# Patient Record
Sex: MALE | Race: WHITE | NOT HISPANIC OR LATINO | Employment: OTHER | URBAN - METROPOLITAN AREA
[De-identification: names, ages, dates, MRNs, and addresses within clinical notes are randomized per-mention and may not be internally consistent; named-entity substitution may affect disease eponyms.]

---

## 2017-12-18 DIAGNOSIS — F41.1 GAD (GENERALIZED ANXIETY DISORDER): ICD-10-CM

## 2017-12-21 RX ORDER — CITALOPRAM HYDROBROMIDE 20 MG/1
20 TABLET ORAL DAILY
Qty: 30 TABLET | Refills: 0 | Status: SHIPPED | OUTPATIENT
Start: 2017-12-21 | End: 2018-02-06

## 2017-12-21 NOTE — TELEPHONE ENCOUNTER
Requested Prescriptions   Pending Prescriptions Disp Refills     citalopram (CELEXA) 20 MG tablet [Pharmacy Med Name: CITALOPRAM HBR 20 MG TABLET] 90 tablet 3     Sig: TAKE 1 TABLET BY MOUTH EVERY DAY    SSRIs Protocol Failed    12/18/2017 10:26 AM       Failed - Recent or future visit with authorizing provider    Patient had office visit in the last year or has a visit in the next 30 days with authorizing provider.  See chart review.          Passed - Medication is NOT Bupropion    If the medication is Bupropion (Wellbutrin), and the patient is taking for smoking cessation; OK to refill.         Passed - Patient is age 18 or older        Medication is being filled for 1 time refill only due to:  Patient needs to be seen because it has been more than one year since last visit.   Dur for annual appointment, please call to schedule.   Valery Burrell RN  Triage Nurse

## 2017-12-27 DIAGNOSIS — I10 ESSENTIAL HYPERTENSION WITH GOAL BLOOD PRESSURE LESS THAN 140/90: ICD-10-CM

## 2017-12-27 NOTE — TELEPHONE ENCOUNTER
Requested Prescriptions   Pending Prescriptions Disp Refills     lisinopril-hydrochlorothiazide (PRINZIDE/ZESTORETIC) 20-12.5 MG per tablet [Pharmacy Med Name: LISINOPRIL-HCTZ 20-12.5 MG TAB]    Last Written Prescription Date:  11/8/2016  Last Fill Quantity: 90,  # refills: 3   Last Office Visit with [facility] or [facility] prescribing provider:  11/8/2016   Future Office Visit:      90 tablet 3     Sig: TAKE 1 TABLET BY MOUTH DAILY    Diuretics (Including Combos) Protocol Failed    12/27/2017 10:56 AM       Failed - Blood pressure under 140/90    BP Readings from Last 3 Encounters:   11/08/16 122/88   06/04/15 112/74   04/14/14 118/80                Failed - Recent or future visit with authorizing provider's specialty    Patient had office visit in the last year or has a visit in the next 30 days with authorizing provider.  See chart review.              Failed - Normal serum creatinine on file in past 12 months    Recent Labs   Lab Test  11/08/16   0942   CR  1.13             Failed - Normal serum potassium on file in past 12 months    Recent Labs   Lab Test  11/08/16   0942   POTASSIUM  4.3                   Failed - Normal serum sodium on file in past 12 months    Recent Labs   Lab Test  11/08/16   0942   NA  138             Passed - Patient is age 18 or older

## 2017-12-29 RX ORDER — LISINOPRIL AND HYDROCHLOROTHIAZIDE 12.5; 2 MG/1; MG/1
TABLET ORAL
Qty: 30 TABLET | Refills: 0 | Status: SHIPPED | OUTPATIENT
Start: 2017-12-29 | End: 2018-01-30

## 2017-12-29 NOTE — TELEPHONE ENCOUNTER
Pharmacy calling that patient is out.   Routing refill request to provider for review/approval because:  Labs not current:  K+ Creat NA  Patient needs to be seen because it has been more than 1 year since last office visit.  Patient called for appointment in another refill request  But has not scheduled.     Paige Siddiqui RN

## 2017-12-29 NOTE — TELEPHONE ENCOUNTER
rx sent for 30 days without refills. Needs appointment lab before further refills. Please let know.    Candida Sin MD  Internal Medicine/Pediatrics

## 2018-01-02 NOTE — TELEPHONE ENCOUNTER
Left message for patient to schedule an annual physical. Needs fasting labs done and medication refill.  DOT Rosario

## 2018-01-30 DIAGNOSIS — I10 ESSENTIAL HYPERTENSION WITH GOAL BLOOD PRESSURE LESS THAN 140/90: ICD-10-CM

## 2018-01-30 NOTE — TELEPHONE ENCOUNTER
"Requested Prescriptions   Pending Prescriptions Disp Refills     lisinopril-hydrochlorothiazide (PRINZIDE/ZESTORETIC) 20-12.5 MG per tablet [Pharmacy Med Name: LISINOPRIL-HCTZ 20-12.5 MG TAB]    Last Written Prescription Date:  12/29/2017  Last Fill Quantity: 30,  # refills: 0   Last Office Visit with FM provider:  11/8/2016   Future Office Visit:      30 tablet 0     Sig: TAKE 1 TABLET BY MOUTH DAILY **NEEDS APPT**    Diuretics (Including Combos) Protocol Failed    1/30/2018  2:07 AM       Failed - Blood pressure under 140/90    BP Readings from Last 3 Encounters:   11/08/16 122/88   06/04/15 112/74   04/14/14 118/80                Failed - Recent or future visit with authorizing provider's specialty    Patient had office visit in the last year or has a visit in the next 30 days with authorizing provider.  See \"Patient Info\" tab in inbasket, or \"Choose Columns\" in Meds & Orders section of the refill encounter.            Failed - Normal serum creatinine on file in past 12 months    Recent Labs   Lab Test  11/08/16   0942   CR  1.13             Failed - Normal serum potassium on file in past 12 months    Recent Labs   Lab Test  11/08/16   0942   POTASSIUM  4.3                   Failed - Normal serum sodium on file in past 12 months    Recent Labs   Lab Test  11/08/16   0942   NA  138             Passed - Patient is age 18 or older          "

## 2018-02-02 RX ORDER — LISINOPRIL AND HYDROCHLOROTHIAZIDE 12.5; 2 MG/1; MG/1
TABLET ORAL
Qty: 30 TABLET | Refills: 0 | Status: SHIPPED | OUTPATIENT
Start: 2018-02-02 | End: 2018-02-06

## 2018-02-02 NOTE — TELEPHONE ENCOUNTER
Medication is being filled for 1 time refill only due to:  Patient needs labs annual potassium and creatinine. Patient needs to be seen because it has been more than one year since last visit. patient has future appointment scheduled with [facility] on 2/6/18    Doreen Kyle RN

## 2018-02-06 ENCOUNTER — OFFICE VISIT (OUTPATIENT)
Dept: PEDIATRICS | Facility: CLINIC | Age: 48
End: 2018-02-06
Payer: COMMERCIAL

## 2018-02-06 VITALS
WEIGHT: 242 LBS | DIASTOLIC BLOOD PRESSURE: 82 MMHG | HEART RATE: 62 BPM | SYSTOLIC BLOOD PRESSURE: 126 MMHG | TEMPERATURE: 97.6 F | BODY MASS INDEX: 34.65 KG/M2 | HEIGHT: 70 IN

## 2018-02-06 DIAGNOSIS — R73.03 PREDIABETES: ICD-10-CM

## 2018-02-06 DIAGNOSIS — I10 ESSENTIAL HYPERTENSION WITH GOAL BLOOD PRESSURE LESS THAN 140/90: ICD-10-CM

## 2018-02-06 DIAGNOSIS — R10.9 LEFT FLANK PAIN: ICD-10-CM

## 2018-02-06 DIAGNOSIS — F41.1 GAD (GENERALIZED ANXIETY DISORDER): ICD-10-CM

## 2018-02-06 DIAGNOSIS — Z00.00 ENCOUNTER FOR ROUTINE ADULT HEALTH EXAMINATION WITHOUT ABNORMAL FINDINGS: Primary | ICD-10-CM

## 2018-02-06 DIAGNOSIS — E78.5 HYPERLIPIDEMIA WITH TARGET LDL LESS THAN 130: ICD-10-CM

## 2018-02-06 LAB
ALBUMIN SERPL-MCNC: 4.3 G/DL (ref 3.4–5)
ALBUMIN UR-MCNC: NEGATIVE MG/DL
ALP SERPL-CCNC: 62 U/L (ref 40–150)
ALT SERPL W P-5'-P-CCNC: 37 U/L (ref 0–70)
ANION GAP SERPL CALCULATED.3IONS-SCNC: 7 MMOL/L (ref 3–14)
APPEARANCE UR: CLEAR
AST SERPL W P-5'-P-CCNC: 20 U/L (ref 0–45)
BILIRUB SERPL-MCNC: 0.7 MG/DL (ref 0.2–1.3)
BILIRUB UR QL STRIP: NEGATIVE
BUN SERPL-MCNC: 13 MG/DL (ref 7–30)
CALCIUM SERPL-MCNC: 9.1 MG/DL (ref 8.5–10.1)
CHLORIDE SERPL-SCNC: 102 MMOL/L (ref 94–109)
CHOLEST SERPL-MCNC: 236 MG/DL
CO2 SERPL-SCNC: 28 MMOL/L (ref 20–32)
COLOR UR AUTO: YELLOW
CREAT SERPL-MCNC: 1.04 MG/DL (ref 0.66–1.25)
GFR SERPL CREATININE-BSD FRML MDRD: 77 ML/MIN/1.7M2
GLUCOSE SERPL-MCNC: 103 MG/DL (ref 70–99)
GLUCOSE UR STRIP-MCNC: NEGATIVE MG/DL
HBA1C MFR BLD: 5.3 % (ref 4.3–6)
HDLC SERPL-MCNC: 31 MG/DL
HGB UR QL STRIP: NEGATIVE
KETONES UR STRIP-MCNC: NEGATIVE MG/DL
LDLC SERPL CALC-MCNC: 161 MG/DL
LEUKOCYTE ESTERASE UR QL STRIP: NEGATIVE
NITRATE UR QL: NEGATIVE
NONHDLC SERPL-MCNC: 205 MG/DL
PH UR STRIP: 6 PH (ref 5–7)
POTASSIUM SERPL-SCNC: 4.1 MMOL/L (ref 3.4–5.3)
PROT SERPL-MCNC: 7.5 G/DL (ref 6.8–8.8)
SODIUM SERPL-SCNC: 137 MMOL/L (ref 133–144)
SOURCE: NORMAL
SP GR UR STRIP: 1.01 (ref 1–1.03)
TRIGL SERPL-MCNC: 219 MG/DL
UROBILINOGEN UR STRIP-ACNC: 0.2 EU/DL (ref 0.2–1)

## 2018-02-06 PROCEDURE — 80061 LIPID PANEL: CPT | Performed by: INTERNAL MEDICINE

## 2018-02-06 PROCEDURE — 99396 PREV VISIT EST AGE 40-64: CPT | Performed by: INTERNAL MEDICINE

## 2018-02-06 PROCEDURE — 83036 HEMOGLOBIN GLYCOSYLATED A1C: CPT | Performed by: INTERNAL MEDICINE

## 2018-02-06 PROCEDURE — 36415 COLL VENOUS BLD VENIPUNCTURE: CPT | Performed by: INTERNAL MEDICINE

## 2018-02-06 PROCEDURE — 80053 COMPREHEN METABOLIC PANEL: CPT | Performed by: INTERNAL MEDICINE

## 2018-02-06 PROCEDURE — 81003 URINALYSIS AUTO W/O SCOPE: CPT | Performed by: INTERNAL MEDICINE

## 2018-02-06 PROCEDURE — 99213 OFFICE O/P EST LOW 20 MIN: CPT | Mod: 25 | Performed by: INTERNAL MEDICINE

## 2018-02-06 RX ORDER — CITALOPRAM HYDROBROMIDE 20 MG/1
20 TABLET ORAL DAILY
Qty: 90 TABLET | Refills: 3 | Status: SHIPPED | OUTPATIENT
Start: 2018-02-06 | End: 2019-02-14

## 2018-02-06 RX ORDER — LISINOPRIL AND HYDROCHLOROTHIAZIDE 12.5; 2 MG/1; MG/1
1 TABLET ORAL DAILY
Qty: 90 TABLET | Refills: 3 | Status: SHIPPED | OUTPATIENT
Start: 2018-02-06 | End: 2019-02-14

## 2018-02-06 ASSESSMENT — ANXIETY QUESTIONNAIRES
GAD7 TOTAL SCORE: 0
2. NOT BEING ABLE TO STOP OR CONTROL WORRYING: NOT AT ALL
5. BEING SO RESTLESS THAT IT IS HARD TO SIT STILL: NOT AT ALL
6. BECOMING EASILY ANNOYED OR IRRITABLE: NOT AT ALL
1. FEELING NERVOUS, ANXIOUS, OR ON EDGE: NOT AT ALL
IF YOU CHECKED OFF ANY PROBLEMS ON THIS QUESTIONNAIRE, HOW DIFFICULT HAVE THESE PROBLEMS MADE IT FOR YOU TO DO YOUR WORK, TAKE CARE OF THINGS AT HOME, OR GET ALONG WITH OTHER PEOPLE: NOT DIFFICULT AT ALL
3. WORRYING TOO MUCH ABOUT DIFFERENT THINGS: NOT AT ALL
7. FEELING AFRAID AS IF SOMETHING AWFUL MIGHT HAPPEN: NOT AT ALL

## 2018-02-06 ASSESSMENT — PATIENT HEALTH QUESTIONNAIRE - PHQ9: 5. POOR APPETITE OR OVEREATING: NOT AT ALL

## 2018-02-06 NOTE — PROGRESS NOTES
SUBJECTIVE:   CC: Geovanni Galvez is an 47 year old male who presents for preventative health visit.     Healthy Habits:    Do you get at least three servings of calcium containing foods daily (dairy, green leafy vegetables, etc.)? yes    Amount of exercise or daily activities, outside of work: sometimes    Problems taking medications regularly No    Medication side effects: No    Have you had an eye exam in the past two years? no    Do you see a dentist twice per year? Every 3 years    Do you have sleep apnea, excessive snoring or daytime drowsiness?no     Concerns:  1)Left side pain on and off for past few months.  Pain mid back, does not radiate.  Worse with coughing and straining  Denies injury or trauma.  Denies hematuria or hx renal stone      Today's PHQ-2 Score:   PHQ-2 ( 1999 [employer]) 2/6/2018 11/8/2016   Q1: Little interest or pleasure in doing things 0 0   Q2: Feeling down, depressed or hopeless 0 0   PHQ-2 Score 0 0       Abuse: Current or Past(Physical, Sexual or Emotional)- No  Do you feel safe in your environment - Yes     If you drink alcohol do you typically have >3 drinks per day or >7 drinks per week? No                      Last PSA: No results found for: PSA    Reviewed orders with patient. Reviewed health maintenance and updated orders accordingly - Yes    Patient Active Problem List   Diagnosis     Prediabetes     Obesity     Hyperlipidemia with target LDL less than 130     Essential hypertension with goal blood pressure less than 140/90     ISH (generalized anxiety disorder)     Past Medical History:   Diagnosis Date     Hypertension        No past surgical history on file.    Family History   Problem Relation Age of Onset     Alcohol/Drug Father      CANCER Father      pancreatic       ALLERGIES   No Known Allergies      Reviewed and updated as needed this visit by clinical staff  Tobacco  Allergies         Reviewed and updated as needed this visit by Provider      ROS:  C:  "NEGATIVE for fever, chills  I: NEGATIVE for worrisome rashes, moles or lesions  E: NEGATIVE for vision changes or irritation  ENT: NEGATIVE for ear, mouth and throat problems  R: NEGATIVE for significant cough or SOB  CV: NEGATIVE for chest pain, palpitations or peripheral edema  GI: NEGATIVE for nausea, abdominal pain, heartburn, or change in bowel habits   male: negative for dysuria, hematuria, decreased urinary stream, erectile dysfunction, urethral discharge  M: NEGATIVE for significant arthralgias or myalgia  N: NEGATIVE for weakness, dizziness or paresthesias  P: NEGATIVE for changes in mood or affect    Current Outpatient Prescriptions   Medication Sig Dispense Refill     lisinopril-hydrochlorothiazide (PRINZIDE/ZESTORETIC) 20-12.5 MG per tablet Take 1 tablet by mouth daily 90 tablet 3     citalopram (CELEXA) 20 MG tablet Take 1 tablet (20 mg) by mouth daily 90 tablet 3       OBJECTIVE:   /82 (Cuff Size: Adult Large)  Pulse 62  Temp 97.6  F (36.4  C) (Oral)  Ht 5' 10\" (1.778 m)  Wt 242 lb (109.8 kg)  BMI 34.72 kg/m2  EXAM:  GENERAL: alert and no distress  EYES: Eyes grossly normal to inspection, PERRL and conjunctivae and sclerae normal  HENT: ear canals and TM's normal, nose and mouth without ulcers or lesions  NECK: no adenopathy, no asymmetry, masses, or scars and thyroid normal to palpation  RESP: lungs clear to auscultation - no rales, rhonchi or wheezes  CV: regular rate and rhythm, normal S1 S2, no S3 or S4, no murmur, click or rub, no peripheral edema and peripheral pulses strong  ABDOMEN: soft, nontender, no hepatosplenomegaly, no masses and bowel sounds normal  Back: tenderness to palpation left CVA, without midline lumbar spinous tenderness  MS: no gross musculoskeletal defects noted, no edema  SKIN: no suspicious lesions or rashes  NEURO: Normal strength and tone, mentation intact and speech normal  PSYCH: mentation appears normal, affect normal/bright    ASSESSMENT/PLAN:       " "ICD-10-CM    1. Encounter for routine adult health examination without abnormal findings Z00.00        2. Essential hypertension with goal blood pressure less than 140/90 I10 lisinopril-hydrochlorothiazide (PRINZIDE/ZESTORETIC) 20-12.5 MG per tablet     Lipid panel reflex to direct LDL Fasting     Comprehensive metabolic panel (BMP + Alb, Alk Phos, ALT, AST, Total. Bili, TP)       Adequate control.  Continue current regimen without changes.      3. Hyperlipidemia with target LDL less than 130 E78.5        Rpt fasting labwork, no current rx       4. ISH (generalized anxiety disorder) F41.1 citalopram (CELEXA) 20 MG tablet     Adequate control.  Continue current regimen without changes.      5. Prediabetes R73.03 Hemoglobin A1c     Discussed weight mgmt     6. Left flank pain R10.9 UA reflex to Microscopic     US Renal Limited      Check u/a, plan for renal US.  ?renal stone/also consider musculoskeletal LBP      Follow-up next 2-3 weeks if sx persist  Will contact patient with results as they return.       COUNSELING:  Reviewed preventive health counseling, as reflected in patient instructions       Regular exercise       Healthy diet/nutrition       Colon cancer screening       Prostate cancer screening       reports that he has never smoked. He has never used smokeless tobacco.    Estimated body mass index is 34.72 kg/(m^2) as calculated from the following:    Height as of this encounter: 5' 10\" (1.778 m).    Weight as of this encounter: 242 lb (109.8 kg).   Weight management plan: Discussed healthy diet and exercise guidelines and patient will follow up in 12 months in clinic to re-evaluate.    Counseling Resources:  ATP IV Guidelines  Pooled Cohorts Equation Calculator  FRAX Risk Assessment  ICSI Preventive Guidelines  Dietary Guidelines for Americans, 2010  USDA's MyPlate  ASA Prophylaxis  Lung CA Screening    John Puri MD, MD  [facility] [place]  "

## 2018-02-06 NOTE — MR AVS SNAPSHOT
After Visit Summary   2/6/2018    Geovanni Galvez    MRN: [id number]           Patient Information     Date Of Birth          1970        Visit Information        Provider Department      2/6/2018 8:00 AM John Puri MD [facility]        Today's Diagnoses     Encounter for routine adult health examination without abnormal findings    -  1    ISH (generalized anxiety disorder)        Essential hypertension with goal blood pressure less than 140/90        Hyperlipidemia with target LDL less than 130        Alcohol abuse        Prediabetes        Left flank pain          Care Instructions    INSTRUCTIONS FOR TODAY:     [facility] will call regarding ultrasound, will contact you with results     If ultrasound normal and pain persists, follow-up in the next few weeks     Dr Puri    Preventive Health Recommendations  Male Ages 40 to 49    Yearly exam:             See your health care provider every year in order to  o   Review health changes.   o   Discuss preventive care.    o   Review your medicines if your doctor has prescribed any.    You should be tested each year for STDs (sexually transmitted diseases) if you re at risk.     Have a cholesterol test every 5 years.     Have a colonoscopy (test for colon cancer) if someone in your family has had colon cancer or polyps before age 50.     After age 45, have a diabetes test (fasting glucose). If you are at risk for diabetes, you should have this test every 3 years.      Talk with your health care provider about whether or not a prostate cancer screening test (PSA) is right for you.    Shots: Get a flu shot each year. Get a tetanus shot every 10 years.     Nutrition:    Eat at least 5 servings of fruits and vegetables daily.     Eat whole-grain bread, whole-wheat pasta and brown rice instead of white grains and rice.     Talk to your provider about Calcium and Vitamin D.     Lifestyle    Exercise for at least 150 minutes a week (30  "minutes a day, 5 days a week). This will help you control your weight and prevent disease.     Limit alcohol to one drink per day.     No smoking.     Wear sunscreen to prevent skin cancer.     See your dentist every six months for an exam and cleaning.              Follow-ups after your visit        Future tests that were ordered for you today     Open Future Orders        Priority Expected Expires Ordered    US Renal Limited Routine  2/6/2019 2/6/2018            Who to contact     If you have questions or need follow up information about today's clinic visit or your schedule please contact [facility] [place] directly at [phone].  Normal or non-critical lab and imaging results will be communicated to you by [employer], letter or phone within 4 business days after the clinic has received the results. If you do not hear from us within 7 days, please contact the clinic through [employer] or phone. If you have a critical or abnormal lab result, we will notify you by phone as soon as possible.  Submit refill requests through [employer] or call your pharmacy and they will forward the refill request to us. Please allow 3 business days for your refill to be completed.          Additional Information About Your Visit        MyChart Information     [employer] gives you secure access to your electronic health record. If you see a primary care provider, you can also send messages to your care team and make appointments. If you have questions, please call your primary care clinic.  If you do not have a primary care provider, please call [phone] and they will assist you.        Care EveryWhere ID     This is your Care EveryWhere ID. This could be used by other organizations to access your [place] medical records  [id number]        Your Vitals Were     Pulse Temperature Height BMI (Body Mass Index)          62 97.6  F (36.4  C) (Oral) 5' 10\" (1.778 m) 34.72 kg/m2         Blood Pressure from Last 3 Encounters:   02/06/18 " 126/82   11/08/16 122/88   06/04/15 112/74    Weight from Last 3 Encounters:   02/06/18 242 lb (109.8 kg)   11/08/16 237 lb 8 oz (107.7 kg)   06/04/15 252 lb (114.3 kg)              We Performed the Following     Comprehensive metabolic panel (BMP + Alb, Alk Phos, ALT, AST, Total. Bili, TP)     Hemoglobin A1c     Lipid panel reflex to direct LDL Fasting     UA reflex to Microscopic          Today's Medication Changes          These changes are accurate as of 2/6/18  8:37 AM.  If you have any questions, ask your nurse or doctor.               These medicines have changed or have updated prescriptions.        Dose/Directions    citalopram 20 MG tablet   Commonly known as:  celeXA   This may have changed:  additional instructions   Used for:  ISH (generalized anxiety disorder)   Changed by:  John Puri MD        Dose:  20 mg   Take 1 tablet (20 mg) by mouth daily   Quantity:  90 tablet   Refills:  3       lisinopril-hydrochlorothiazide 20-12.5 MG per tablet   Commonly known as:  PRINZIDE/ZESTORETIC   This may have changed:  See the new instructions.   Used for:  Essential hypertension with goal blood pressure less than 140/90   Changed by:  John Puri MD        Dose:  1 tablet   Take 1 tablet by mouth daily   Quantity:  90 tablet   Refills:  3            Where to get your medicines      These medications were sent to [facility]/pharmacy #[id number] - [place], [place] - [address]  [address]  [place] [address] [facility] [address]     Phone:  [phone]     citalopram 20 MG tablet    lisinopril-hydrochlorothiazide 20-12.5 MG per tablet                Primary Care Provider Office Phone # Fax #    John Puri [phone] [address] [place] [address]        Equal Access to Services     DEREJE GARAY AH: Zander Perera, benny estes, qavianca kaalhema connelly, jose simmons. So [facility] [phone].    ATENCIÓN: Anirudh hudson,  tiene a mantilla disposición servicios gratuitos de asistencia lingüística. Kamron robert [phone].    We comply with applicable federal civil rights laws and [place] laws. We do not discriminate on the basis of race, color, national origin, age, disability, sex, sexual orientation, or gender identity.            Thank you!     Thank you for choosing [facility]  for your care. Our goal is always to provide you with excellent care. Hearing back from our patients is one way we can continue to improve our services. Please take a few minutes to complete the written survey that you may receive in the mail after your visit with us. Thank you!             Your Updated Medication List - Protect others around you: Learn how to safely use, store and throw away your medicines at www.disposemymeds.org.          This list is accurate as of 2/6/18  8:37 AM.  Always use your most recent med list.                   Brand Name Dispense Instructions for use Diagnosis    citalopram 20 MG tablet    celeXA    90 tablet    Take 1 tablet (20 mg) by mouth daily    ISH (generalized anxiety disorder)       lisinopril-hydrochlorothiazide 20-12.5 MG per tablet    PRINZIDE/ZESTORETIC    90 tablet    Take 1 tablet by mouth daily    Essential hypertension with goal blood pressure less than 140/90

## 2018-02-06 NOTE — NURSING NOTE
"Chief Complaint   Patient presents with     Physical       Initial /82 (Cuff Size: Adult Large)  Pulse 62  Temp 97.6  F (36.4  C) (Oral)  Ht 5' 10\" (1.778 m)  Wt 242 lb (109.8 kg)  BMI 34.72 kg/m2 Estimated body mass index is 34.72 kg/(m^2) as calculated from the following:    Height as of this encounter: 5' 10\" (1.778 m).    Weight as of this encounter: 242 lb (109.8 kg).  Medication Reconciliation: demetris Rosario CMA    "

## 2018-02-06 NOTE — PATIENT INSTRUCTIONS
INSTRUCTIONS FOR TODAY:     [facility] will call regarding ultrasound, will contact you with results     If ultrasound normal and pain persists, follow-up in the next few weeks     Dr Puri    Preventive Health Recommendations  Male Ages 40 to 49    Yearly exam:             See your health care provider every year in order to  o   Review health changes.   o   Discuss preventive care.    o   Review your medicines if your doctor has prescribed any.    You should be tested each year for STDs (sexually transmitted diseases) if you re at risk.     Have a cholesterol test every 5 years.     Have a colonoscopy (test for colon cancer) if someone in your family has had colon cancer or polyps before age 50.     After age 45, have a diabetes test (fasting glucose). If you are at risk for diabetes, you should have this test every 3 years.      Talk with your health care provider about whether or not a prostate cancer screening test (PSA) is right for you.    Shots: Get a flu shot each year. Get a tetanus shot every 10 years.     Nutrition:    Eat at least 5 servings of fruits and vegetables daily.     Eat whole-grain bread, whole-wheat pasta and brown rice instead of white grains and rice.     Talk to your provider about Calcium and Vitamin D.     Lifestyle    Exercise for at least 150 minutes a week (30 minutes a day, 5 days a week). This will help you control your weight and prevent disease.     Limit alcohol to one drink per day.     No smoking.     Wear sunscreen to prevent skin cancer.     See your dentist every six months for an exam and cleaning.

## 2018-02-07 ASSESSMENT — ANXIETY QUESTIONNAIRES: GAD7 TOTAL SCORE: 0

## 2018-02-09 ENCOUNTER — HOSPITAL ENCOUNTER (OUTPATIENT)
Dept: ULTRASOUND IMAGING | Facility: CLINIC | Age: 48
Discharge: HOME | End: 2018-02-09
Attending: INTERNAL MEDICINE | Admitting: INTERNAL MEDICINE
Payer: COMMERCIAL

## 2018-02-09 DIAGNOSIS — R10.9 LEFT FLANK PAIN: ICD-10-CM

## 2018-02-09 PROCEDURE — 76770 US EXAM ABDO BACK WALL COMP: CPT

## 2019-02-14 DIAGNOSIS — F41.1 GAD (GENERALIZED ANXIETY DISORDER): ICD-10-CM

## 2019-02-14 DIAGNOSIS — I10 ESSENTIAL HYPERTENSION WITH GOAL BLOOD PRESSURE LESS THAN 140/90: ICD-10-CM

## 2019-02-14 NOTE — TELEPHONE ENCOUNTER
"Requested Prescriptions   Pending Prescriptions Disp Refills     citalopram (CELEXA) 20 MG tablet [Pharmacy Med Name: CITALOPRAM HBR 20 MG TABLET]    Last Written Prescription Date:  2/6/2018  Last Fill Quantity: 90,  # refills: 3   Last office visit: 2/6/2018 with prescribing provider:  John Puri Office Visit:     90 tablet 3     Sig: TAKE 1 TABLET (20 MG) BY MOUTH DAILY    SSRIs Protocol Failed - 2/14/2019  1:06 AM       Failed - Recent (12 mo) or future (30 days) visit within the authorizing provider's specialty    Patient had office visit in the last 12 months or has a visit in the next 30 days with authorizing provider or within the authorizing provider's specialty.  See \"Patient Info\" tab in inbasket, or \"Choose Columns\" in Meds & Orders section of the refill encounter.             Passed - Medication is active on med list       Passed - Patient is age 18 or older        lisinopril-hydrochlorothiazide (PRINZIDE/ZESTORETIC) 20-12.5 MG tablet [Pharmacy Med Name: LISINOPRIL-HCTZ 20-12.5 MG TAB]    Last Written Prescription Date:  2/6/2018  Last Fill Quantity: 90,  # refills: 3   Last office visit: 2/6/2018 with prescribing provider:  John Puri Office Visit:     90 tablet 3     Sig: TAKE 1 TABLET BY MOUTH DAILY    Diuretics (Including Combos) Protocol Failed - 2/14/2019  1:06 AM       Failed - Blood pressure under 140/90 in past 12 months    BP Readings from Last 3 Encounters:   02/06/18 126/82   11/08/16 122/88   06/04/15 112/74                Failed - Recent (12 mo) or future (30 days) visit within the authorizing provider's specialty    Patient had office visit in the last 12 months or has a visit in the next 30 days with authorizing provider or within the authorizing provider's specialty.  See \"Patient Info\" tab in inbasket, or \"Choose Columns\" in Meds & Orders section of the refill encounter.             Failed - Normal serum creatinine on file in past 12 months    " Recent Labs   Lab Test 02/06/18  0848   CR 1.04             Failed - Normal serum potassium on file in past 12 months    Recent Labs   Lab Test 02/06/18  0848   POTASSIUM 4.1                   Failed - Normal serum sodium on file in past 12 months    Recent Labs   Lab Test 02/06/18  0848                Passed - Medication is active on med list       Passed - Patient is age 18 or older

## 2019-02-15 ENCOUNTER — OFFICE VISIT (OUTPATIENT)
Dept: URGENT CARE | Facility: URGENT CARE | Age: 49
End: 2019-02-15
Payer: COMMERCIAL

## 2019-02-15 VITALS
RESPIRATION RATE: 18 BRPM | WEIGHT: 226 LBS | SYSTOLIC BLOOD PRESSURE: 134 MMHG | HEART RATE: 75 BPM | TEMPERATURE: 101.2 F | DIASTOLIC BLOOD PRESSURE: 78 MMHG | BODY MASS INDEX: 32.43 KG/M2 | OXYGEN SATURATION: 100 %

## 2019-02-15 DIAGNOSIS — R05.9 COUGH: Primary | ICD-10-CM

## 2019-02-15 DIAGNOSIS — J10.1 INFLUENZA A: ICD-10-CM

## 2019-02-15 LAB
FLUAV+FLUBV AG SPEC QL: NEGATIVE
FLUAV+FLUBV AG SPEC QL: POSITIVE
SPECIMEN SOURCE: ABNORMAL

## 2019-02-15 PROCEDURE — 87804 INFLUENZA ASSAY W/OPTIC: CPT | Performed by: FAMILY MEDICINE

## 2019-02-15 PROCEDURE — 99213 OFFICE O/P EST LOW 20 MIN: CPT | Performed by: FAMILY MEDICINE

## 2019-02-15 NOTE — TELEPHONE ENCOUNTER
LMTCB-    Patient needs yearly appointment made.    Routing refill request to provider for review/approval because:  Labs not current:  K+ Creat NA  Patient needs to be seen because it has been more than 1 year since last office visit.    Queenie Lauren RN Flex

## 2019-02-15 NOTE — PATIENT INSTRUCTIONS
Patient Education     Influenza (Adult)    Influenza is also called the flu. It is a viral illness that affects the air passages of your lungs. It is different from the common cold. The flu can easily be passed from one to person to another. It may be spread through the air by coughing and sneezing. Or it can be spread by touching the sick person and then touching your own eyes, nose, or mouth.  The flu starts 1 to 3 days after you are exposed to the flu virus. It may last for 1 to 2 weeks but many people feel tired or fatigued for many weeks afterward. You usually don t need to take antibiotics unless you have a complication. This might be an ear or sinus infection or pneumonia.  Symptoms of the flu may be mild or severe. They can include extreme tiredness (wanting to stay in bed all day), chills, fevers, muscle aches, soreness with eye movement, headache, and a dry, hacking cough.  Home care  Follow these guidelines when caring for yourself at home:    Avoid being around cigarette smoke, whether yours or other people s.    Acetaminophen or ibuprofen will help ease your fever, muscle aches, and headache. Don t give aspirin to anyone younger than 18 who has the flu. Aspirin can harm the liver.    Nausea and loss of appetite are common with the flu. Eat light meals. Drink 6 to 8 glasses of liquids every day. Good choices are water, sport drinks, soft drinks without caffeine, juices, tea, and soup. Extra fluids will also help loosen secretions in your nose and lungs.    Over-the-counter cold medicines will not make the flu go away faster. But the medicines may help with coughing, sore throat, and congestion in your nose and sinuses. Don t use a decongestant if you have high blood pressure.    Stay home until your fever has been gone for at least 24 hours without using medicine to reduce fever.  Follow-up care  Follow up with your healthcare provider, or as advised, if you are not getting better over the next  week.  If you are age 65 or older, talk with your provider about getting a pneumococcal vaccine every 5 years. You should also get this vaccine if you have chronic asthma or COPD. All adults should get a flu vaccine every fall. Ask your provider about this.  When to seek medical advice  Call your healthcare provider right away if any of these occur:    Cough with lots of colored mucus (sputum) or blood in your mucus    Chest pain, shortness of breath, wheezing, or trouble breathing    Severe headache, or face, neck, or ear pain    New rash with fever    Fever of 100.4 F (38 C) or higher, or as directed by your healthcare provider    Confusion, behavior change, or seizure    Severe weakness or dizziness    You get a new fever or cough after getting better for a few days  Date Last Reviewed: 1/1/2017 2000-2018 [employer]. [address] [place] [address], [place] [address]. All rights reserved. This information is not intended as a substitute for professional medical care. Always follow your healthcare professional's instructions.

## 2019-02-15 NOTE — PROGRESS NOTES
SUBJECTIVE:   Chief Complaint   Patient presents with     Urgent Care     URI     Started Wed, head pressure, chest congestion, cough that is painful, dizzy      Geovanni Galvez is a 48 year old male who present complaining of flu-like symptoms: fevers, chills, myalgias, headache,  congestion, sore throat and cough for 3 days. Denies dyspnea or wheezing.      Past Medical History:   Diagnosis Date     Hypertension      Patient Active Problem List   Diagnosis     Prediabetes     Obesity     Hyperlipidemia with target LDL less than 130     Essential hypertension with goal blood pressure less than 140/90     ISH (generalized anxiety disorder)       ALLERGIES:  Patient has no known allergies.        Current Outpatient Medications on File Prior to Visit:  citalopram (CELEXA) 20 MG tablet Take 1 tablet (20 mg) by mouth daily   lisinopril-hydrochlorothiazide (PRINZIDE/ZESTORETIC) 20-12.5 MG per tablet Take 1 tablet by mouth daily     No current facility-administered medications on file prior to visit.     Social History     Tobacco Use     Smoking status: Never Smoker     Smokeless tobacco: Never Used   Substance Use Topics     Alcohol use: Yes     Alcohol/week: 0.0 oz       Family History   Problem Relation Age of Onset     Alcohol/Drug Father      Cancer Father         pancreatic         ROS:  CONSTITUTIONAL  fever, chills,   INTEGUMENTARY/SKIN: NEGATIVE for worrisome rashes,    EYES: NEGATIVE for vision changes or irritation  RESP:NEGATIVE for significant cough or SOB  GI: NEGATIVE for nausea, abdominal pain,   or change in bowel habits          OBJECTIVE:      /78   Pulse 75   Temp 101.2  F (38.4  C) (Oral)   Resp 18   Wt 102.5 kg (226 lb)   SpO2 100%   BMI 32.43 kg/m      GENERAL APPEARANCE: alert, moderate distress, cooperative, flushed and fatigued-  Laying on the exam table-   EYES: EOMI,  PERRL, conjunctiva clear  HENT: ear canals and TM's normal.  Nose and mouth without ulcers, erythema or  lesions.  No sinus tenderness  NECK: supple, nontender, no lymphadenopathy  RESP: lungs clear to auscultation - no rales, rhonchi or wheezes  CV: regular rates and rhythm, normal S1 S2, no murmur noted  NEURO: Normal strength and tone, sensory exam grossly normal,  normal speech and mentation  SKIN: no suspicious lesions or rashes       Rapid influenza screen positive    ASSESSMENT:  Cough     - Influenza A/B antigen    Influenza A     We discussed the limitations of antiviral therapy against influenza, that treatment should usually be initiated within 2 days of the start of symptoms and is most critical for persons with weak immunity and chronic respiratory illnesses    Symptomatic therapy suggested:  Rest, drink lots of fluids,  Acetaminophen / ibuprofen for body aches and fever,  Salt water gargles for sore throat,  OTC anesthetic lozenges for sore throat,  OTC cough medications.   Call or return to clinic prn if these symptoms worsen or fail to improve as anticipated.    Treatment and prophylaxis for close contacts  was discussed  Advised that influenza illness usually lasts a week, sometimes more and that the patient should avoid contact with others, and cover the mouth when coughing to limit spread of the infection.    Note for work - declined

## 2019-02-16 RX ORDER — CITALOPRAM HYDROBROMIDE 20 MG/1
20 TABLET ORAL DAILY
Qty: 30 TABLET | Refills: 0 | Status: SHIPPED | OUTPATIENT
Start: 2019-02-16 | End: 2019-03-21

## 2019-02-16 RX ORDER — LISINOPRIL AND HYDROCHLOROTHIAZIDE 12.5; 2 MG/1; MG/1
1 TABLET ORAL DAILY
Qty: 30 TABLET | Refills: 0 | Status: SHIPPED | OUTPATIENT
Start: 2019-02-16 | End: 2019-03-21

## 2019-03-19 DIAGNOSIS — I10 ESSENTIAL HYPERTENSION WITH GOAL BLOOD PRESSURE LESS THAN 140/90: ICD-10-CM

## 2019-03-19 DIAGNOSIS — F41.1 GAD (GENERALIZED ANXIETY DISORDER): ICD-10-CM

## 2019-03-19 NOTE — TELEPHONE ENCOUNTER
"Requested Prescriptions   Pending Prescriptions Disp Refills     citalopram (CELEXA) 20 MG tablet  Last Written Prescription Date:  02/16/2019  Last Fill Quantity: 30 tablet,  # refills: 0   Last office visit: 2/6/2018 with prescribing provider:  John Puri MD    Future Office Visit:     30 tablet 0     Sig: Take 1 tablet (20 mg) by mouth daily Due for annual visit    SSRIs Protocol Failed - 3/19/2019  2:31 PM   No flowsheet data found.  ISH-7 SCORE 2/6/2018   Total Score 0           Failed - Recent (12 mo) or future (30 days) visit within the authorizing provider's specialty    Patient had office visit in the last 12 months or has a visit in the next 30 days with authorizing provider or within the authorizing provider's specialty.  See \"Patient Info\" tab in inbasket, or \"Choose Columns\" in Meds & Orders section of the refill encounter.             Passed - Medication is active on med list       Passed - Patient is age 18 or older        Requesting a 90 day supply on both medications  "

## 2019-03-19 NOTE — TELEPHONE ENCOUNTER
Pt. Request 90 day supply of     lisinopril-hydrochlorothiazide (PRINZIDE/ZESTORETIC) 20-12.5 MG tablet

## 2019-03-19 NOTE — TELEPHONE ENCOUNTER
"Requested Prescriptions   Pending Prescriptions Disp Refills     lisinopril-hydrochlorothiazide (PRINZIDE/ZESTORETIC) 20-12.5 MG tablet  Last Written Prescription Date:  02/16/2019  Last Fill Quantity: 30 tablet,  # refills: 0   Last office visit: 2/6/2018 with prescribing provider:  John Puri MD    Future Office Visit:     30 tablet 0     Sig: Take 1 tablet by mouth daily Due for annual visit    Diuretics (Including Combos) Protocol Failed - 3/19/2019  2:30 PM       Failed - Recent (12 mo) or future (30 days) visit within the authorizing provider's specialty    Patient had office visit in the last 12 months or has a visit in the next 30 days with authorizing provider or within the authorizing provider's specialty.  See \"Patient Info\" tab in inbasket, or \"Choose Columns\" in Meds & Orders section of the refill encounter.             Failed - Normal serum creatinine on file in past 12 months    Recent Labs   Lab Test 02/06/18  0848   CR 1.04             Failed - Normal serum potassium on file in past 12 months    Recent Labs   Lab Test 02/06/18  0848   POTASSIUM 4.1                   Failed - Normal serum sodium on file in past 12 months    Recent Labs   Lab Test 02/06/18  0848                Passed - Blood pressure under 140/90 in past 12 months    BP Readings from Last 3 Encounters:   02/15/19 134/78   02/06/18 126/82   11/08/16 122/88                Passed - Medication is active on med list       Passed - Patient is age 18 or older          "

## 2019-03-21 RX ORDER — LISINOPRIL AND HYDROCHLOROTHIAZIDE 12.5; 2 MG/1; MG/1
1 TABLET ORAL DAILY
Qty: 30 TABLET | Refills: 0 | Status: SHIPPED | OUTPATIENT
Start: 2019-03-21 | End: 2019-05-14

## 2019-03-21 RX ORDER — CITALOPRAM HYDROBROMIDE 20 MG/1
20 TABLET ORAL DAILY
Qty: 30 TABLET | Refills: 0 | Status: SHIPPED | OUTPATIENT
Start: 2019-03-21 | End: 2019-05-14

## 2019-03-21 NOTE — TELEPHONE ENCOUNTER
Routing refill request to provider for review/approval because:  Rasheeda given x1 and patient did not follow up, please advise  Labs not current:  all  Patient needs to be seen because it has been more than 1 year since last office visit.  Candida CARRASQUILLO RN - Triage  [facility]

## 2019-03-21 NOTE — TELEPHONE ENCOUNTER
Routing refill request to provider for review/approval because:  Rasheeda given x1 and patient did not follow up, please advise  Patient needs to be seen because it has been more than 1 year since last office visit.  Candida CARRASQUILLO RN - Triage  [facility]

## 2019-05-14 ENCOUNTER — OFFICE VISIT (OUTPATIENT)
Dept: PEDIATRICS | Facility: CLINIC | Age: 49
End: 2019-05-14
Payer: COMMERCIAL

## 2019-05-14 VITALS
HEART RATE: 57 BPM | OXYGEN SATURATION: 97 % | WEIGHT: 205 LBS | SYSTOLIC BLOOD PRESSURE: 122 MMHG | TEMPERATURE: 98.6 F | DIASTOLIC BLOOD PRESSURE: 76 MMHG | HEIGHT: 70 IN | BODY MASS INDEX: 29.35 KG/M2

## 2019-05-14 DIAGNOSIS — F41.1 GAD (GENERALIZED ANXIETY DISORDER): Primary | ICD-10-CM

## 2019-05-14 DIAGNOSIS — I10 ESSENTIAL HYPERTENSION WITH GOAL BLOOD PRESSURE LESS THAN 140/90: ICD-10-CM

## 2019-05-14 DIAGNOSIS — E78.5 HYPERLIPIDEMIA WITH TARGET LDL LESS THAN 130: ICD-10-CM

## 2019-05-14 PROCEDURE — 99214 OFFICE O/P EST MOD 30 MIN: CPT | Performed by: INTERNAL MEDICINE

## 2019-05-14 PROCEDURE — 36415 COLL VENOUS BLD VENIPUNCTURE: CPT | Performed by: INTERNAL MEDICINE

## 2019-05-14 PROCEDURE — 80061 LIPID PANEL: CPT | Performed by: INTERNAL MEDICINE

## 2019-05-14 PROCEDURE — 80053 COMPREHEN METABOLIC PANEL: CPT | Performed by: INTERNAL MEDICINE

## 2019-05-14 RX ORDER — CITALOPRAM HYDROBROMIDE 20 MG/1
20 TABLET ORAL DAILY
Qty: 90 TABLET | Refills: 3 | Status: SHIPPED | OUTPATIENT
Start: 2019-05-14 | End: 2020-05-27

## 2019-05-14 RX ORDER — LISINOPRIL AND HYDROCHLOROTHIAZIDE 12.5; 2 MG/1; MG/1
1 TABLET ORAL DAILY
Qty: 90 TABLET | Refills: 3 | Status: SHIPPED | OUTPATIENT
Start: 2019-05-14 | End: 2020-05-27

## 2019-05-14 ASSESSMENT — ANXIETY QUESTIONNAIRES
1. FEELING NERVOUS, ANXIOUS, OR ON EDGE: NOT AT ALL
6. BECOMING EASILY ANNOYED OR IRRITABLE: NOT AT ALL
GAD7 TOTAL SCORE: 0
3. WORRYING TOO MUCH ABOUT DIFFERENT THINGS: NOT AT ALL
IF YOU CHECKED OFF ANY PROBLEMS ON THIS QUESTIONNAIRE, HOW DIFFICULT HAVE THESE PROBLEMS MADE IT FOR YOU TO DO YOUR WORK, TAKE CARE OF THINGS AT HOME, OR GET ALONG WITH OTHER PEOPLE: NOT DIFFICULT AT ALL
2. NOT BEING ABLE TO STOP OR CONTROL WORRYING: NOT AT ALL
7. FEELING AFRAID AS IF SOMETHING AWFUL MIGHT HAPPEN: NOT AT ALL
5. BEING SO RESTLESS THAT IT IS HARD TO SIT STILL: NOT AT ALL

## 2019-05-14 ASSESSMENT — PATIENT HEALTH QUESTIONNAIRE - PHQ9
5. POOR APPETITE OR OVEREATING: NOT AT ALL
SUM OF ALL RESPONSES TO PHQ QUESTIONS 1-9: 0

## 2019-05-14 ASSESSMENT — MIFFLIN-ST. JEOR: SCORE: 1811.12

## 2019-05-14 NOTE — PATIENT INSTRUCTIONS
Problem List Items Addressed This Visit     Hyperlipidemia with target LDL less than 130 - Primary     Rechecking cholesterol today         ISH (generalized anxiety disorder)     Well controlled, continue citalopram         Relevant Medications    citalopram (CELEXA) 20 MG tablet    Essential hypertension with goal blood pressure less than 140/90     Well controlled, no changes         Relevant Medications    lisinopril-hydrochlorothiazide (PRINZIDE/ZESTORETIC) 20-12.5 MG tablet    Other Relevant Orders    Comprehensive metabolic panel    Lipid panel reflex to direct LDL Fasting

## 2019-05-14 NOTE — PROGRESS NOTES
SUBJECTIVE:   Geovanni Galvez is a 48 year old male who presents to clinic today for the following   health issues:    Hypertension Follow-up      Outpatient blood pressures are not being checked.    Low Salt Diet: no added salt    Recent Labs   Lab Test 02/06/18  0848 11/08/16  0942 10/02/14  0927 07/11/13  0912   CHOL 236* 247* 219* 232*   HDL 31* 35* 36* 40   * 158* 146* 139*   TRIG 219* 271* 184* 262*   CHOLHDLRATIO  --   --  6.1* 5.8*        Anxiety Follow-Up    Status since last visit: Improved     Other associated symptoms:None    Complicating factors:   Significant life event: No   Current substance abuse: None  Depression symptoms: No  ISH-7 SCORE 2/6/2018   Total Score 0       ISH-7    Amount of exercise or physical activity: 6-7 days/week for an average of 45-60 minutes    Problems taking medications regularly: No    Medication side effects: none    Diet: low salt      Patient Active Problem List   Diagnosis     Prediabetes     Obesity     Hyperlipidemia with target LDL less than 130     Essential hypertension with goal blood pressure less than 140/90     ISH (generalized anxiety disorder)     No past surgical history on file.    Social History     Tobacco Use     Smoking status: Never Smoker     Smokeless tobacco: Never Used   Substance Use Topics     Alcohol use: Yes     Alcohol/week: 0.0 oz     Family History   Problem Relation Age of Onset     Alcohol/Drug Father      Cancer Father         pancreatic         Current Outpatient Medications   Medication Sig Dispense Refill     citalopram (CELEXA) 20 MG tablet Take 1 tablet (20 mg) by mouth daily 90 tablet 3     lisinopril-hydrochlorothiazide (PRINZIDE/ZESTORETIC) 20-12.5 MG tablet Take 1 tablet by mouth daily 90 tablet 3       ROS: The following systems have been completely reviewed and are negative except as noted in the HPI: CONSTITUTIONAL,  CARDIOVASCULAR, PULMONARY    OBJECTIVE:                                                    BP  "122/76 (Cuff Size: Adult Regular)   Pulse 57   Temp 98.6  F (37  C) (Oral)   Ht 1.778 m (5' 10\")   Wt 93 kg (205 lb)   SpO2 97%   BMI 29.41 kg/m   Body mass index is 29.41 kg/m .  GENERAL:  alert,  no distress  Psych:normal affect  RESP: lungs clear to auscultation - no rales, no rhonchi, no wheezes  CV: regular rates and rhythm, normal S1 S2, no S3 or S4 and no murmur, no click or rub   MS: extremities- no edema     ASSESSMENT/PLAN:                                                        ICD-10-CM    1. ISH (generalized anxiety disorder) F41.1 citalopram (CELEXA) 20 MG tablet   2. Essential hypertension with goal blood pressure less than 140/90 I10 lisinopril-hydrochlorothiazide (PRINZIDE/ZESTORETIC) 20-12.5 MG tablet     Comprehensive metabolic panel     Lipid panel reflex to direct LDL Fasting   3. Hyperlipidemia with target LDL less than 130 E78.5         Problem List Items Addressed This Visit     Hyperlipidemia with target LDL less than 130     Rechecking cholesterol today, discussed healthy diet         ISH (generalized anxiety disorder) - Primary     Well controlled, continue citalopram         Relevant Medications    citalopram (CELEXA) 20 MG tablet    Essential hypertension with goal blood pressure less than 140/90     Well controlled, no changes         Relevant Medications    lisinopril-hydrochlorothiazide (PRINZIDE/ZESTORETIC) 20-12.5 MG tablet    Other Relevant Orders    Comprehensive metabolic panel    Lipid panel reflex to direct LDL Fasting           John Puri MD  [facility]    "

## 2019-05-15 LAB
ALBUMIN SERPL-MCNC: 4.1 G/DL (ref 3.4–5)
ALP SERPL-CCNC: 73 U/L (ref 40–150)
ALT SERPL W P-5'-P-CCNC: 53 U/L (ref 0–70)
ANION GAP SERPL CALCULATED.3IONS-SCNC: 7 MMOL/L (ref 3–14)
AST SERPL W P-5'-P-CCNC: 27 U/L (ref 0–45)
BILIRUB SERPL-MCNC: 0.9 MG/DL (ref 0.2–1.3)
BUN SERPL-MCNC: 17 MG/DL (ref 7–30)
CALCIUM SERPL-MCNC: 8.9 MG/DL (ref 8.5–10.1)
CHLORIDE SERPL-SCNC: 104 MMOL/L (ref 94–109)
CHOLEST SERPL-MCNC: 189 MG/DL
CO2 SERPL-SCNC: 28 MMOL/L (ref 20–32)
CREAT SERPL-MCNC: 1.04 MG/DL (ref 0.66–1.25)
GFR SERPL CREATININE-BSD FRML MDRD: 85 ML/MIN/{1.73_M2}
GLUCOSE SERPL-MCNC: 101 MG/DL (ref 70–99)
HDLC SERPL-MCNC: 46 MG/DL
LDLC SERPL CALC-MCNC: 132 MG/DL
NONHDLC SERPL-MCNC: 143 MG/DL
POTASSIUM SERPL-SCNC: 4.6 MMOL/L (ref 3.4–5.3)
PROT SERPL-MCNC: 7.1 G/DL (ref 6.8–8.8)
SODIUM SERPL-SCNC: 139 MMOL/L (ref 133–144)
TRIGL SERPL-MCNC: 55 MG/DL

## 2019-05-15 ASSESSMENT — ANXIETY QUESTIONNAIRES: GAD7 TOTAL SCORE: 0

## 2019-08-13 ENCOUNTER — TELEPHONE (OUTPATIENT)
Dept: PEDIATRICS | Facility: CLINIC | Age: 49
End: 2019-08-13

## 2019-08-13 NOTE — TELEPHONE ENCOUNTER
Called and left message requesting call back with clinic number. Unsure as to what is needed. Only information given at this time is shoulder problem. When call is returned please advise patient we are looking to assist him but need more information as what assistance is needed. Nydia Fung MA

## 2019-08-13 NOTE — TELEPHONE ENCOUNTER
Call from pt- pt hurt right shoulder doing [employer] about 6 weeks ago. Pt has been working through it, but pain has gotten worse. Had to take last 5 days off training due to injury.     Pt inquiring about referral for sports medicine to assess the injury. Advised could call insurance to see if referral is needed, if not, could call  [facility] in [place] to schedule if referral not req from them either. Pt will call insurance and call us back if further assistance is needed. Pt did not want ph number to  sports and other at this time.     Ashleigh GARZA RN

## 2019-08-14 ENCOUNTER — OFFICE VISIT (OUTPATIENT)
Dept: ORTHOPEDICS | Facility: CLINIC | Age: 49
End: 2019-08-14
Payer: COMMERCIAL

## 2019-08-14 ENCOUNTER — ANCILLARY PROCEDURE (OUTPATIENT)
Dept: GENERAL RADIOLOGY | Facility: CLINIC | Age: 49
End: 2019-08-14
Attending: FAMILY MEDICINE
Payer: COMMERCIAL

## 2019-08-14 VITALS
WEIGHT: 210 LBS | HEIGHT: 70 IN | BODY MASS INDEX: 30.06 KG/M2 | DIASTOLIC BLOOD PRESSURE: 74 MMHG | SYSTOLIC BLOOD PRESSURE: 116 MMHG

## 2019-08-14 DIAGNOSIS — M25.511 ACUTE PAIN OF RIGHT SHOULDER: ICD-10-CM

## 2019-08-14 DIAGNOSIS — M75.41 IMPINGEMENT SYNDROME OF RIGHT SHOULDER: ICD-10-CM

## 2019-08-14 DIAGNOSIS — M25.511 ACUTE PAIN OF RIGHT SHOULDER: Primary | ICD-10-CM

## 2019-08-14 DIAGNOSIS — M75.21 BICEPS TENDINITIS OF RIGHT UPPER EXTREMITY: ICD-10-CM

## 2019-08-14 PROCEDURE — 99204 OFFICE O/P NEW MOD 45 MIN: CPT | Performed by: FAMILY MEDICINE

## 2019-08-14 PROCEDURE — 73030 X-RAY EXAM OF SHOULDER: CPT | Mod: RT

## 2019-08-14 RX ORDER — NAPROXEN 500 MG/1
500 TABLET ORAL 2 TIMES DAILY WITH MEALS
Qty: 60 TABLET | Refills: 1 | Status: ON HOLD | OUTPATIENT
Start: 2019-08-14 | End: 2022-11-17

## 2019-08-14 ASSESSMENT — MIFFLIN-ST. JEOR: SCORE: 1833.8

## 2019-08-14 NOTE — PROGRESS NOTES
ASSESSMENT & PLAN  Patient Instructions     1. Acute pain of right shoulder    2. Biceps tendinitis of right upper extremity    3. Impingement syndrome of right shoulder      -Patient has right shoulder pain due to bursitis and biceps tendinitis  -Patient will start formal physical therapy and home exercises  -Patient will start Naproxen 500mg twice a day as needed  -Patient may continue to train [employer] as tolerated  -Patient will follow up in 4 weeks for re-evaluation.    -Call direct clinic number [[phone]] at any time with questions or concerns.    Albert Yeo MD [facility]  [facility]          -----    SUBJECTIVE  Geovanni Galvez is a/an 48 year old Right handed male who is seen as a self referral for evaluation of right shoulder pain. The patient is seen by themselves.    Onset: 6-8 week(s) ago. Reports insidious onset without acute precipitating event.  Patient thinks his recent training in jiu [email or web address] may have caused a flare in shoulder pain.   Location of Pain: right superior shoulder, anteriorly, and radiates into the upper arm. Pain varies from dull, to sharp.  Rating of Pain at worst: 6/10  Rating of Pain Currently: 4/10  Worsened by: reaching up and overhead  Better with: nothing  Treatments tried: rest/activity avoidance: 1week of rest from jiu jitsu  Associated symptoms: numbness and tingling intermittently. Denies weakness.   Orthopedic history: NO  Relevant surgical history: NO  Social history: social history: works for the [employer], as police.     Past Medical History:   Diagnosis Date     Hypertension      Social History     Socioeconomic History     Marital status: Single     Spouse name: Not on file     Number of children: Not on file     Years of education: Not on file     Highest education level: Not on file   Occupational History     Not on file   Social Needs     Financial resource strain: Not on file     Food insecurity:      "Worry: Not on file     Inability: Not on file     Transportation needs:     Medical: Not on file     Non-medical: Not on file   Tobacco Use     Smoking status: Never Smoker     Smokeless tobacco: Never Used   Substance and Sexual Activity     Alcohol use: Yes     Alcohol/week: 0.0 oz     Drug use: No     Sexual activity: Yes   Lifestyle     Physical activity:     Days per week: Not on file     Minutes per session: Not on file     Stress: Not on file   Relationships     Social connections:     Talks on phone: Not on file     Gets together: Not on file     Attends Orthodoxy service: Not on file     Active member of club or organization: Not on file     Attends meetings of clubs or organizations: Not on file     Relationship status: Not on file     Intimate partner violence:     Fear of current or ex partner: Not on file     Emotionally abused: Not on file     Physically abused: Not on file     Forced sexual activity: Not on file   Other Topics Concern     Parent/sibling w/ CABG, MI or angioplasty before 65F 55M? Not Asked   Social History Narrative     Not on file         Patient's past medical, surgical, social, and family histories were reviewed today and no changes are noted.    REVIEW OF SYSTEMS:  10 point ROS is negative other than symptoms noted above in HPI, Past Medical History or as stated below  Constitutional: NEGATIVE for fever, chills, change in weight  Skin: NEGATIVE for worrisome rashes, moles or lesions  GI/: NEGATIVE for bowel or bladder changes  Neuro: NEGATIVE for weakness, dizziness or paresthesias    OBJECTIVE:  /74 (BP Location: Right arm, Patient Position: Chair, Cuff Size: Adult Regular)   Ht 1.778 m (5' 10\")   Wt 95.3 kg (210 lb)   BMI 30.13 kg/m     General: healthy, alert and in no distress  HEENT: no scleral icterus or conjunctival erythema  Skin: no suspicious lesions or rash. No jaundice.  CV: regular rhythm by palpation  Resp: normal respiratory effort without conversational " dyspnea   Psych: normal mood and affect  Gait: normal steady gait with appropriate coordination and balance  Neuro: normal light touch sensory exam of the bilateral upper extremities.    MSK:  RIGHT SHOULDER  Inspection:    no swelling, bruising, discoloration, or obvious deformity or asymmetry  Palpation:    Tender about the proximal biceps tendon. Remainder of bony and tendinous landmarks are nontender.  Active Range of Motion:     Abduction normal0, FF normal0, ER normal0, IR normal.      Scapular dyskinesis absent  Strength:    Scapular plane abduction 5-/5,  ER 5/5, IR 5/5, biceps 5/5, triceps 5/5  Special Tests:    Positive: Neer's, Hammonds' and Speed's    Negative: supraspinatus (empty can), drop arm/painful arc, crossed arm adduction and [place]'s        Independent visualization of the below image:  Recent Results (from the past 24 hour(s))   XR Shoulder Right G/E 3 Views    Narrative    SHOULDER RIGHT THREE OR MORE VIEWS  8/14/2019 4:35 PM     HISTORY: Acute pain of right shoulder.    COMPARISON: None.      Impression    IMPRESSION: No acute or chronic bony abnormality. Distal acromial  morphology is type II and humeral acromial distance appears adequate.           Albert Yeo MD [facility]

## 2019-08-14 NOTE — PATIENT INSTRUCTIONS
1. Acute pain of right shoulder    2. Biceps tendinitis of right upper extremity    3. Impingement syndrome of right shoulder      -Patient has right shoulder pain due to bursitis and biceps tendinitis  -Patient will start formal physical therapy and home exercises  -Patient will start Naproxen 500mg twice a day as needed  -Patient may continue to train [employer] as tolerated  -Patient will follow up in 4 weeks for re-evaluation.    -Call direct clinic number [[phone]] at any time with questions or concerns.    Albert Yeo MD [facility]  [facility] [place]

## 2019-08-14 NOTE — LETTER
8/14/2019         RE: Geovanni Galvez  [address] [place] [address]        Dear Colleague,    Thank you for referring your patient, Geovanni Galvez, to the [facility]. Please see a copy of my visit note below.    ASSESSMENT & PLAN  Patient Instructions     1. Acute pain of right shoulder    2. Biceps tendinitis of right upper extremity    3. Impingement syndrome of right shoulder      -Patient has right shoulder pain due to bursitis and biceps tendinitis  -Patient will start formal physical therapy and home exercises  -Patient will start Naproxen 500mg twice a day as needed  -Patient may continue to train jiu jitsu as tolerated  -Patient will follow up in 4 weeks for re-evaluation.    -Call direct clinic number [[phone]] at any time with questions or concerns.    Albert Yeo MD [facility]  [facility]          -----    SUBJECTIVE  Geovanni Galvez is a/an 48 year old Right handed male who is seen as a self referral for evaluation of right shoulder pain. The patient is seen by themselves.    Onset: 6-8 week(s) ago. Reports insidious onset without acute precipitating event.  Patient thinks his recent training in jiu jitsu may have caused a flare in shoulder pain.   Location of Pain: right superior shoulder, anteriorly, and radiates into the upper arm. Pain varies from dull, to sharp.  Rating of Pain at worst: 6/10  Rating of Pain Currently: 4/10  Worsened by: reaching up and overhead  Better with: nothing  Treatments tried: rest/activity avoidance: 1week of rest from jiu jitsu  Associated symptoms: numbness and tingling intermittently. Denies weakness.   Orthopedic history: NO  Relevant surgical history: NO  Social history: social history: works for the [employer], as police.     Past Medical History:   Diagnosis Date     Hypertension      Social History     Socioeconomic History     Marital status: Single      "Spouse name: Not on file     Number of children: Not on file     Years of education: Not on file     Highest education level: Not on file   Occupational History     Not on file   Social Needs     Financial resource strain: Not on file     Food insecurity:     Worry: Not on file     Inability: Not on file     Transportation needs:     Medical: Not on file     Non-medical: Not on file   Tobacco Use     Smoking status: Never Smoker     Smokeless tobacco: Never Used   Substance and Sexual Activity     Alcohol use: Yes     Alcohol/week: 0.0 oz     Drug use: No     Sexual activity: Yes   Lifestyle     Physical activity:     Days per week: Not on file     Minutes per session: Not on file     Stress: Not on file   Relationships     Social connections:     Talks on phone: Not on file     Gets together: Not on file     Attends Quaker service: Not on file     Active member of club or organization: Not on file     Attends meetings of clubs or organizations: Not on file     Relationship status: Not on file     Intimate partner violence:     Fear of current or ex partner: Not on file     Emotionally abused: Not on file     Physically abused: Not on file     Forced sexual activity: Not on file   Other Topics Concern     Parent/sibling w/ CABG, MI or angioplasty before 65F 55M? Not Asked   Social History Narrative     Not on file         Patient's past medical, surgical, social, and family histories were reviewed today and no changes are noted.    REVIEW OF SYSTEMS:  10 point ROS is negative other than symptoms noted above in HPI, Past Medical History or as stated below  Constitutional: NEGATIVE for fever, chills, change in weight  Skin: NEGATIVE for worrisome rashes, moles or lesions  GI/: NEGATIVE for bowel or bladder changes  Neuro: NEGATIVE for weakness, dizziness or paresthesias    OBJECTIVE:  /74 (BP Location: Right arm, Patient Position: Chair, Cuff Size: Adult Regular)   Ht 1.778 m (5' 10\")   Wt 95.3 kg (210 " lb)   BMI 30.13 kg/m      General: healthy, alert and in no distress  HEENT: no scleral icterus or conjunctival erythema  Skin: no suspicious lesions or rash. No jaundice.  CV: regular rhythm by palpation  Resp: normal respiratory effort without conversational dyspnea   Psych: normal mood and affect  Gait: normal steady gait with appropriate coordination and balance  Neuro: normal light touch sensory exam of the bilateral upper extremities.    MSK:  RIGHT SHOULDER  Inspection:    no swelling, bruising, discoloration, or obvious deformity or asymmetry  Palpation:    Tender about the proximal biceps tendon. Remainder of bony and tendinous landmarks are nontender.  Active Range of Motion:     Abduction normal0, FF normal0, ER normal0, IR normal.      Scapular dyskinesis absent  Strength:    Scapular plane abduction 5-/5,  ER 5/5, IR 5/5, biceps 5/5, triceps 5/5  Special Tests:    Positive: Neer's, Hammonds' and Speed's    Negative: supraspinatus (empty can), drop arm/painful arc, crossed arm adduction and Portland's        Independent visualization of the below image:  Recent Results (from the past 24 hour(s))   XR Shoulder Right G/E 3 Views    Narrative    SHOULDER RIGHT THREE OR MORE VIEWS  8/14/2019 4:35 PM     HISTORY: Acute pain of right shoulder.    COMPARISON: None.      Impression    IMPRESSION: No acute or chronic bony abnormality. Distal acromial  morphology is type II and humeral acromial distance appears adequate.           Albert Yeo MD [facility]      Again, thank you for allowing me to participate in the care of your patient.        Sincerely,        Albert Yeo, MD

## 2019-08-22 ENCOUNTER — THERAPY VISIT (OUTPATIENT)
Dept: PHYSICAL THERAPY | Facility: CLINIC | Age: 49
End: 2019-08-22
Payer: COMMERCIAL

## 2019-08-22 DIAGNOSIS — M75.21 BICEPS TENDINITIS OF RIGHT UPPER EXTREMITY: ICD-10-CM

## 2019-08-22 DIAGNOSIS — M25.511 RIGHT SHOULDER PAIN: ICD-10-CM

## 2019-08-22 DIAGNOSIS — M75.41 IMPINGEMENT SYNDROME OF RIGHT SHOULDER: ICD-10-CM

## 2019-08-22 PROCEDURE — 97110 THERAPEUTIC EXERCISES: CPT | Mod: GP | Performed by: PHYSICAL THERAPIST

## 2019-08-22 PROCEDURE — 97161 PT EVAL LOW COMPLEX 20 MIN: CPT | Mod: GP | Performed by: PHYSICAL THERAPIST

## 2019-08-22 NOTE — PROGRESS NOTES
[facility] Initial Evaluation  Subjective:  The history is provided by the patient. No  was used.   Type of problem:  Right shoulder   Condition occurred with:  Unknown cause. This is a new condition   Problem details: Patient reports a gradual onset of shoulder pain beginning about 6 weeks ago.  Physical therapy was ordered 8/14/2019.  Patient attributes onset of symptoms to [email or web address] training.  He started training about 8 months ago.  Patient c/o pain reaching overhead and reaching behind.   Patient reports pain:  Anterior and posterior.  Associated with: none. Symptoms are exacerbated by using arm behind back and using arm overhead and relieved by rest.    Where condition occurred: during recreation / sport.  and reported as 4/10 on pain scale. General health as reported by patient is good. Pertinent medical history includes:  High blood pressure.  Medical allergies: none.  Surgeries include:  None.  Current medications:  High blood pressure medication.     Pain is described as aching and is intermittent. Pain is worse during the night. Since onset symptoms are unchanged. Special tests:  X-ray. Past treatment: none.    Patient is . Restrictions include:  Working in normal job without restrictions.    Barriers include:  None as reported by patient.  Red flags:  None as reported by patient.                      Objective:  Standing Alignment:      Shoulder/UE:  Rounded shoulders, protracted scapula L and protracted scapula R                                       Shoulder Evaluation:  ROM:  AROM:    Flexion:  Right:  N    Abduction:  Right:  N    Internal Rotation:  Right:  N  External Rotation:  Right:  N                PROM:    Flexion:  Right: N      Abduction:  Right:  N    Internal Rotation:  Right:  N  External Rotation:  Right:  N                    Strength:        Abduction:  Right: 5/5      Pain:-  Adduction:  Right: 5/5      Pain:-  Internal Rotation:   Right: 5/5      Pain:-  External Rotation:   Right:5-/5      Pain:-      Horizontal Adduction:  Right:/5     Pain:-        Special Tests:      Right shoulder positive for the following special tests:Impingement  Right shoulder negative for the following special tests:Rotator cuff tear    Mobility Tests:  normal                                                 General     ROS    Assessment/Plan:    Patient is a 49 year old male with right side shoulder complaints.    Patient has the following significant findings with corresponding treatment plan.                Diagnosis 1:  Shoulder Impingement  Pain -  self management, education and home program  Decreased ROM/flexibility - therapeutic exercise, therapeutic activity and home program  Decreased strength - therapeutic exercise, therapeutic activities and home program  Impaired muscle performance - neuro re-education and home program  Decreased function - therapeutic activities and home program  Impaired posture - neuro re-education, therapeutic activities and home program    Therapy Evaluation Codes:   1) History comprised of:   Personal factors that impact the plan of care:      None.    Comorbidity factors that impact the plan of care are:      None.     Medications impacting care: High blood pressure.  2) Examination of Body Systems comprised of:   Body structures and functions that impact the plan of care:      Shoulder.   Activity limitations that impact the plan of care are:      Sports and Reaching.  3) Clinical presentation characteristics are:   Stable/Uncomplicated.  4) Decision-Making    Low complexity using standardized patient assessment instrument and/or measureable assessment of functional outcome.  Cumulative Therapy Evaluation is: Low complexity.    Previous and current functional limitations:  (See Goal Flow Sheet for this information)    Short term and Long term goals: (See Goal Flow Sheet for this information)     Communication ability:  Patient  appears to be able to clearly communicate and understand verbal and written communication and follow directions correctly.  Treatment Explanation - The following has been discussed with the patient:   RX ordered/plan of care  Anticipated outcomes  Possible risks and side effects  This patient would benefit from PT intervention to resume normal activities.   Rehab potential is good.    Frequency:  1 X week, once daily  Duration:  for 6 weeks  Discharge Plan:  Achieve all LTG.  Independent in home treatment program.  Reach maximal therapeutic benefit.    Please refer to the daily flowsheet for treatment today, total treatment time and time spent performing 1:1 timed codes.

## 2019-09-05 ENCOUNTER — THERAPY VISIT (OUTPATIENT)
Dept: PHYSICAL THERAPY | Facility: CLINIC | Age: 49
End: 2019-09-05
Payer: COMMERCIAL

## 2019-09-05 DIAGNOSIS — G89.29 CHRONIC RIGHT SHOULDER PAIN: ICD-10-CM

## 2019-09-05 DIAGNOSIS — M25.511 CHRONIC RIGHT SHOULDER PAIN: ICD-10-CM

## 2019-09-05 PROCEDURE — 97110 THERAPEUTIC EXERCISES: CPT | Mod: GP | Performed by: PHYSICAL THERAPIST

## 2019-10-01 ENCOUNTER — HEALTH MAINTENANCE LETTER (OUTPATIENT)
Age: 49
End: 2019-10-01

## 2019-10-04 ENCOUNTER — THERAPY VISIT (OUTPATIENT)
Dept: PHYSICAL THERAPY | Facility: CLINIC | Age: 49
End: 2019-10-04
Payer: COMMERCIAL

## 2019-10-04 DIAGNOSIS — G89.29 CHRONIC RIGHT SHOULDER PAIN: Primary | ICD-10-CM

## 2019-10-04 DIAGNOSIS — M25.511 CHRONIC RIGHT SHOULDER PAIN: Primary | ICD-10-CM

## 2019-10-04 PROCEDURE — 97110 THERAPEUTIC EXERCISES: CPT | Mod: GP | Performed by: PHYSICAL THERAPIST

## 2019-10-04 NOTE — PROGRESS NOTES
DISCHARGE REPORT    Progress reporting period is from eval to 10/3/19.       SUBJECTIVE  Subjective changes noted by patient:  .  Subjective: Functioning at 80% of whre he wants to be.  No sx's at night any more.  Minimal sx's with reaching above shoulder height.      Current pain level is  Current Pain level: 0/10.     Previous pain level was   Initial Pain level: 5/10.   Changes in function:  Yes (See Goal flowsheet attached for changes in current functional level)  Adverse reaction to treatment or activity: None    OBJECTIVE  Changes noted in objective findings:  Yes,   Objective: AROM WNL.  Strength 4+/5 generally in R shoulder.  Negative impingement screening today     ASSESSMENT/PLAN  Updated problem list and treatment plan: Diagnosis 1:  R shoulder pain  Decreased strength - therapeutic exercise and therapeutic activities  Decreased function - therapeutic activities  STG/LTGs have been met or progress has been made towards goals:  Yes (See Goal flow sheet completed today.)  Assessment of Progress: The patient's condition is improving.  The patient's condition has potential to improve.  Self Management Plans:  Patient has been instructed in a home treatment program.  Patient is independent in a home treatment program.  I have re-evaluated this patient and find that the nature, scope, duration and intensity of the therapy is appropriate for the medical condition of the patient.      Recommendations:  This patient is ready to be discharged from therapy and continue their home treatment program.    Please refer to the daily flowsheet for treatment today, total treatment time and time spent performing 1:1 timed codes.

## 2020-05-27 DIAGNOSIS — F41.1 GAD (GENERALIZED ANXIETY DISORDER): ICD-10-CM

## 2020-05-27 DIAGNOSIS — I10 ESSENTIAL HYPERTENSION WITH GOAL BLOOD PRESSURE LESS THAN 140/90: ICD-10-CM

## 2020-05-27 RX ORDER — CITALOPRAM HYDROBROMIDE 20 MG/1
TABLET ORAL
Qty: 90 TABLET | Refills: 3 | Status: SHIPPED | OUTPATIENT
Start: 2020-05-27 | End: 2021-05-27

## 2020-05-27 RX ORDER — LISINOPRIL AND HYDROCHLOROTHIAZIDE 12.5; 2 MG/1; MG/1
TABLET ORAL
Qty: 90 TABLET | Refills: 3 | Status: SHIPPED | OUTPATIENT
Start: 2020-05-27 | End: 2021-05-27

## 2020-05-27 NOTE — TELEPHONE ENCOUNTER
Pt scheduled for a televisit on 6/1.  He does need refills prior to this appointment.  He is requesting Dr. Puri sends a 90 day supply.  Uri understands that this might not be possible until after his appointment on 6/1.    Rupinder Booker     3:21 PM May 27, 2020

## 2020-05-27 NOTE — TELEPHONE ENCOUNTER
Patient has appointment scheduled for 6/1/2020.   Will need refills of his prescriptions:    -citalopram (CELEXA) 20 MG tablet   -lisinopril-hydrochlorothiazide (PRINZIDE/ZESTORETIC) 20-12.5 MG tablet     Basilia Jin MA

## 2020-05-27 NOTE — TELEPHONE ENCOUNTER
Routing refill request to provider for review/approval because:  High pharmacy warning.     Also, the Pt is requesting a 90 day supply. RN can only issue 30 day kelsi.     Leelee Martin RN   [facility] -- Triage Nurse

## 2020-06-01 ENCOUNTER — TELEPHONE (OUTPATIENT)
Dept: PEDIATRICS | Facility: CLINIC | Age: 50
End: 2020-06-01

## 2020-06-01 ENCOUNTER — VIRTUAL VISIT (OUTPATIENT)
Dept: PEDIATRICS | Facility: CLINIC | Age: 50
End: 2020-06-01
Payer: COMMERCIAL

## 2020-06-01 DIAGNOSIS — I10 ESSENTIAL HYPERTENSION WITH GOAL BLOOD PRESSURE LESS THAN 140/90: Primary | ICD-10-CM

## 2020-06-01 DIAGNOSIS — F41.1 GAD (GENERALIZED ANXIETY DISORDER): ICD-10-CM

## 2020-06-01 DIAGNOSIS — E78.5 HYPERLIPIDEMIA WITH TARGET LDL LESS THAN 130: ICD-10-CM

## 2020-06-01 DIAGNOSIS — N52.2 DRUG-INDUCED ERECTILE DYSFUNCTION: ICD-10-CM

## 2020-06-01 PROCEDURE — 99214 OFFICE O/P EST MOD 30 MIN: CPT | Mod: 95 | Performed by: INTERNAL MEDICINE

## 2020-06-01 RX ORDER — SILDENAFIL CITRATE 20 MG/1
TABLET ORAL
Qty: 30 TABLET | Refills: 6 | Status: SHIPPED | OUTPATIENT
Start: 2020-06-01 | End: 2020-10-02

## 2020-06-01 NOTE — TELEPHONE ENCOUNTER
General Call:   Who is calling:  patient  Reason for Call:  Wants rx for sildenafil (REVATIO) 20 MG tablet resent to [facility] in [place] due to cost   What are your questions or concerns:  Too expensive at his pharmacy  Date of last appointment with provider: today  Okay to leave a detailed message:Yes at Cell number on file:    Telephone Information:   Mobile [phone]

## 2020-06-01 NOTE — TELEPHONE ENCOUNTER
Please call patient and request that he do a pharmacy to pharmacy transfer of his medication.     Odessa Riley RN on 6/1/2020 at 2:37 PM

## 2020-06-01 NOTE — TELEPHONE ENCOUNTER
Spoke with PT.  Advised PT to have his Rx transferred by the pharmacy.  Closing the encounter    Rupinder Booker     3:13 PM June 1, 2020

## 2020-06-01 NOTE — PROGRESS NOTES
"Geovanni Galvez is a 49 year old male who is being evaluated via a billable telephone visit.      The patient has been notified of following:     \"This telephone visit will be conducted via a call between you and your physician/provider. We have found that certain health care needs can be provided without the need for a physical exam.  This service lets us provide the care you need with a short phone conversation.  If a prescription is necessary we can send it directly to your pharmacy.  If lab work is needed we can place an order for that and you can then stop by our lab to have the test done at a later time.    Telephone visits are billed at different rates depending on your insurance coverage. During this emergency period, for some insurers they may be billed the same as an in-person visit.  Please reach out to your insurance provider with any questions.    If during the course of the call the physician/provider feels a telephone visit is not appropriate, you will not be charged for this service.\"    Patient has given verbal consent for Telephone visit?  Yes    What phone number would you like to be contacted at? [phone]    How would you like to obtain your AVS? Jarenhart    Subjective     Geovanni Galvez is a 49 year old male who presents via phone visit today for the following health issues:    Essential hypertension with goal blood pressure less than 140/90      Tolerating regimen without side effects.  Has lost a significant amount of weight since he stopped drinking a few years ago.  Exercises regularly.    Follow-up anxiety.  Mood symptoms have been well controlled on citalopram- steady improvement over the past few years.  Has noted some issues with erectile dysfunction, requests rx    Hyperlipidemia with target LDL less than 130       History of hyperlipidemia-has continued to work on diet changes  Recent Labs   Lab Test 05/14/19  1101 02/06/18  0848  10/02/14  0927 07/11/13  0912   CHOL 189 " 236*   < > 219* 232*   HDL 46 31*   < > 36* 40   * 161*   < > 146* 139*   TRIG 55 219*   < > 184* 262*   CHOLHDLRATIO  --   --   --  6.1* 5.8*    < > = values in this interval not displayed.        Patient Active Problem List   Diagnosis     Prediabetes     Obesity     Hyperlipidemia with target LDL less than 130     Essential hypertension with goal blood pressure less than 140/90     ISH (generalized anxiety disorder)     No past surgical history on file.    Social History     Tobacco Use     Smoking status: Never Smoker     Smokeless tobacco: Never Used   Substance Use Topics     Alcohol use: Yes     Alcohol/week: 0.0 standard drinks     Family History   Problem Relation Age of Onset     Alcohol/Drug Father      Cancer Father         pancreatic         Current Outpatient Medications   Medication Sig Dispense Refill     citalopram (CELEXA) 20 MG tablet TAKE 1 TABLET BY MOUTH EVERY DAY 90 tablet 3     lisinopril-hydrochlorothiazide (ZESTORETIC) 20-12.5 MG tablet TAKE 1 TABLET BY MOUTH EVERY DAY 90 tablet 3     sildenafil (REVATIO) 20 MG tablet Take 2- 5 tablets 30min to 2 hour prior to activity daily as needed 30 tablet 6     naproxen (NAPROSYN) 500 MG tablet Take 1 tablet (500 mg) by mouth 2 times daily (with meals) 60 tablet 1       Reviewed and updated as needed this visit by Provider         Review of Systems   Constitutional, cardiovascular, pulmonary systems are negative, except as otherwise noted.       Objective   Reported vitals: There were no vitals taken for this visit.  Psychiatric: Alert and oriented x3, coherent speech, normal rate and volume, affect is normal.  Respiratory: Without cough, without audible wheezing  Remainder of exam unable to be completed due to telephone visit.         Assessment/Plan:  1. Essential hypertension with goal blood pressure less than 140/90  Adequate control.  Continue current regimen without changes.     2. Hyperlipidemia with target LDL less than 130  Medication  not indicated, continues to work on lifestyle/diet    3. ISH (generalized anxiety disorder)  Adequate control.  Continue current regimen without changes.     4. Drug-induced erectile dysfunction  Start trial of sildenafil, medication use and side effects reviewed.  - sildenafil (REVATIO) 20 MG tablet; Take 2- 5 tablets 30min to 2 hour prior to activity daily as needed  Dispense: 30 tablet; Refill: 6    Phone call duration:  7 minutes    John Puri MD, MD

## 2020-08-14 DIAGNOSIS — U07.1 2019 NOVEL CORONAVIRUS DISEASE (COVID-19): Primary | ICD-10-CM

## 2020-08-14 PROCEDURE — U0003 INFECTIOUS AGENT DETECTION BY NUCLEIC ACID (DNA OR RNA); SEVERE ACUTE RESPIRATORY SYNDROME CORONAVIRUS 2 (SARS-COV-2) (CORONAVIRUS DISEASE [COVID-19]), AMPLIFIED PROBE TECHNIQUE, MAKING USE OF HIGH THROUGHPUT TECHNOLOGIES AS DESCRIBED BY CMS-2020-01-R: HCPCS | Performed by: FAMILY MEDICINE

## 2020-08-17 LAB
SARS-COV-2 RNA SPEC QL NAA+PROBE: NOT DETECTED
SPECIMEN SOURCE: NORMAL

## 2020-10-02 DIAGNOSIS — N52.2 DRUG-INDUCED ERECTILE DYSFUNCTION: ICD-10-CM

## 2020-10-02 RX ORDER — SILDENAFIL CITRATE 20 MG/1
TABLET ORAL
Qty: 30 TABLET | Refills: 2 | Status: SHIPPED | OUTPATIENT
Start: 2020-10-02 | End: 2020-12-02

## 2020-10-02 NOTE — TELEPHONE ENCOUNTER
Prescription approved per [facility] Refill Protocol.    Queenie Lauren RN Flex

## 2020-10-02 NOTE — TELEPHONE ENCOUNTER
Medication Question or Refill    Who is calling: patient    What medication are you calling about (include dose and sig)?: sildenafil (REVATIO) 20 MG tablet    Controlled Substance Agreement on file: No    Who prescribed the medication?: Dr Puri    Do you need a refill? Yes: needs refill    When did you use the medication last? unknown    Patient offered an appointment? No    Do you have any questions or concerns?  No    [facility]ed [facility]: [facility]    Okay to leave a detailed message?: Yes at Home number on file [phone] (home)

## 2020-11-30 DIAGNOSIS — N52.2 DRUG-INDUCED ERECTILE DYSFUNCTION: ICD-10-CM

## 2020-12-02 RX ORDER — SILDENAFIL CITRATE 20 MG/1
TABLET ORAL
Qty: 30 TABLET | Refills: 0 | Status: SHIPPED | OUTPATIENT
Start: 2020-12-02 | End: 2020-12-10

## 2020-12-02 NOTE — TELEPHONE ENCOUNTER
Should have refill, resent  Prescription approved per [facility] Refill Protocol.  Shabana Cesar RN, BSN  Message handled by CLINIC NURSE.

## 2020-12-10 ENCOUNTER — MYC REFILL (OUTPATIENT)
Dept: PEDIATRICS | Facility: CLINIC | Age: 50
End: 2020-12-10

## 2020-12-10 DIAGNOSIS — N52.2 DRUG-INDUCED ERECTILE DYSFUNCTION: ICD-10-CM

## 2020-12-11 RX ORDER — SILDENAFIL CITRATE 20 MG/1
TABLET ORAL
Qty: 30 TABLET | Refills: 3 | Status: SHIPPED | OUTPATIENT
Start: 2020-12-11 | End: 2020-12-11

## 2020-12-11 RX ORDER — SILDENAFIL CITRATE 20 MG/1
TABLET ORAL
Qty: 30 TABLET | Refills: 11 | Status: SHIPPED | OUTPATIENT
Start: 2020-12-11 | End: 2023-11-28

## 2020-12-11 RX ORDER — SILDENAFIL CITRATE 20 MG/1
TABLET ORAL
Qty: 30 TABLET | Refills: 0 | OUTPATIENT
Start: 2020-12-11

## 2020-12-11 NOTE — TELEPHONE ENCOUNTER
Dr. Puri,     The Pt called in insisting on speak with an RN about his Sildenafil.   He is requesting that more then 3 refills be attached to this refill at a time.     Please advise on how many refills you are comfortable with us sending in.   Last script signed today was sent in with 30 tabs, 3 refills. The Pt would like more refills than this.     The Pt would like us to notify him on mychart what you decide.     Leelee Martin, NATALIE   [facility] -- Triage Nurse

## 2020-12-11 NOTE — TELEPHONE ENCOUNTER
Prescription approved per [facility] Refill Protocol.    Shraddha DALTON RN, BSN

## 2021-01-06 ENCOUNTER — TELEPHONE (OUTPATIENT)
Dept: PEDIATRICS | Facility: CLINIC | Age: 51
End: 2021-01-06

## 2021-01-06 DIAGNOSIS — Z20.822 EXPOSURE TO COVID-19 VIRUS: Primary | ICD-10-CM

## 2021-01-06 DIAGNOSIS — R05.9 COUGH: ICD-10-CM

## 2021-01-06 DIAGNOSIS — Z20.822 EXPOSURE TO COVID-19 VIRUS: ICD-10-CM

## 2021-01-06 LAB
SARS-COV-2 RNA RESP QL NAA+PROBE: NORMAL
SPECIMEN SOURCE: NORMAL

## 2021-01-06 PROCEDURE — U0005 INFEC AGEN DETEC AMPLI PROBE: HCPCS | Performed by: INTERNAL MEDICINE

## 2021-01-06 PROCEDURE — U0003 INFECTIOUS AGENT DETECTION BY NUCLEIC ACID (DNA OR RNA); SEVERE ACUTE RESPIRATORY SYNDROME CORONAVIRUS 2 (SARS-COV-2) (CORONAVIRUS DISEASE [COVID-19]), AMPLIFIED PROBE TECHNIQUE, MAKING USE OF HIGH THROUGHPUT TECHNOLOGIES AS DESCRIBED BY CMS-2020-01-R: HCPCS | Performed by: INTERNAL MEDICINE

## 2021-01-06 NOTE — TELEPHONE ENCOUNTER
Patient called today.    Patient is asking for a COVID test.    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    Doesn't feel that needs to do On Care and is not with [facility] employee.    Please contact patient.    Thank you.    Central Scheduling  Catherine RUIZ

## 2021-01-06 NOTE — TELEPHONE ENCOUNTER
Called patient to get more information.      The patient's significant other tested positive for COVID 1/3.  The patient is starting to experience the same symptoms that she started with and would like to get tested for COVID.  The patient also stated that he is a  and previously he went to [facility] and when he informed them that he was a  the doctor just gave him a test. He is wondering if Dr. Puri would be willing to place an order for him to get this test done without having to do an e-visit or Oncare visit.     Routing to Dr. Puri to advise.     Shazia Guillen

## 2021-01-06 NOTE — TELEPHONE ENCOUNTER
Call placed to pt with message from provider    Pt verbalized understanding and agrees to the plan  Provided pt with # for pt Covid testing scheduling    Thank you  Faith Hale RN on 1/6/2021 at 9:34 AM

## 2021-01-06 NOTE — TELEPHONE ENCOUNTER
Yes that would be fine--please call pt.  Please clarify which [place] site he can go to have the testing done-order done

## 2021-01-07 ENCOUNTER — TELEPHONE (OUTPATIENT)
Dept: PEDIATRICS | Facility: CLINIC | Age: 51
End: 2021-01-07

## 2021-01-07 LAB
LABORATORY COMMENT REPORT: ABNORMAL
SARS-COV-2 RNA RESP QL NAA+PROBE: POSITIVE
SPECIMEN SOURCE: ABNORMAL

## 2021-01-07 NOTE — TELEPHONE ENCOUNTER
"Coronavirus (COVID-19) Notification    Caller Name (Patient, parent, daughter/son, grandparent, etc)  Patient     Reason for call  Notify of Positive Coronavirus (COVID-19) lab results, assess symptoms,  review  [facility] recommendations    Lab Result    Lab test:  2019-nCoV rRt-PCR or SARS-CoV-2 PCR    Oropharyngeal AND/OR nasopharyngeal swabs is POSITIVE for 2019-nCoV RNA/SARS-COV-2 PCR (COVID-19 virus)    RN Recommendations/Instructions per [facility] Coronavirus COVID-19 recommendations    Brief introduction script  Introduce self then review script:  \"I am calling on behalf of [employer].  We were notified that your Coronavirus test (COVID-19) for was POSITIVE for the virus.  I have some information to relay to you but first I wanted to mention that the [place] Dept of Health will be contacting you shortly [it's possible MD already called Patient] to talk to you more about how you are feeling and other people you have had contact with who might now also have the virus.  Also,  [facility] is Partnering with the [facility] for Covid-19 research, you may be contacted directly by research staff.\"    Assessment (Inquire about Patient's current symptoms)   Assessment   Current Symptoms at time of phone call: (if no symptoms, document No symptoms] Sinusitis, dizziness, Headache     Symptoms onset (if applicable) 1/4/2021     If at time of call, Patients symptoms hare worsened, the Patient should contact 911 or have someone drive them to Emergency Dept promptly:      If Patient calling 911, inform 911 personal that you have tested positive for the Coronavirus (COVID-19).  Place mask on and await 911 to arrive.    If Emergency Dept, If possible, please have another adult drive you to the Emergency Dept but you need to wear mask when in contact with other people.      Monoclonal Antibody Administration    You may be eligible to receive a new treatment with a monoclonal antibody for preventing " "hospitalization in patients at high risk for complications from COVID-19.   This medication is still experimental and available on a limited basis; it is given through an IV and must be given at an infusion center. Please note that not all people who are eligible will receive the medication since it is in limited supply.     Are you interested in being considered for this medication?  No.   Does the patient fit the criteria: No    If patient qualifies based on above criteria:  \"We will contact you if you are selected to receive the medication in the next 1-2 days.   This is time sensitive and if you are not selected in the next 1-2 days, you will not receive the medication.  If you do not receive a call to schedule, you have not been selected.\"    Review information with Patient    Your result was positive. This means you have COVID-19 (coronavirus).  We have sent you a letter that reviews the information that I'll be reviewing with you now.    How can I protect others?    If you have symptoms: stay home and away from others (self-isolate) until:    You've had no fever--and no medicine that reduces fever--for 1 full day (24 hours). And       Your other symptoms have gotten better. For example, your cough or breathing has improved. And     At least 10 days have passed since your symptoms started. (If you've been told by a doctor that you have a weak immune system, wait 20 days.)     If you don't have symptoms: Stay home and away from others (self-isolate) until at least 10 days have passed since your first positive COVID-19 test. (Date test collected)    During this time:    Stay in your own room, including for meals. Use your own bathroom if you can.    Stay away from others in your home. No hugging, kissing or shaking hands. No visitors.     Don't go to work, school or anywhere else.     Clean  high touch  surfaces often (doorknobs, counters, handles, etc.). Use a household cleaning spray or wipes. You'll find a " full list on the EPA website at www.epa.gov/pesticide-registration/list-n-disinfectants-use-against-sars-cov-2.     Cover your mouth and nose with a mask, tissue or other face covering to avoid spreading germs.    Wash your hands and face often with soap and water.    Caregivers in these groups are at risk for severe illness due to COVID-19:  o People 65 years and older  o People who live in a nursing home or long-term care facility  o People with chronic disease (lung, heart, cancer, diabetes, kidney, liver, immunologic)  o People who have a weakened immune system, including those who:  - Are in cancer treatment  - Take medicine that weakens the immune system, such as corticosteroids  - Had a bone marrow or organ transplant  - Have an immune deficiency  - Have poorly controlled HIV or AIDS  - Are obese (body mass index of 40 or higher)  - Smoke regularly    Caregivers should wear gloves while washing dishes, handling laundry and cleaning bedrooms and bathrooms.    Wash and dry laundry with special caution. Don't shake dirty laundry, and use the warmest water setting you can.    If you have a weakened immune system, ask your doctor about other actions you should take.    For more tips, go to www.cdc.gov/coronavirus/2019-ncov/downloads/10Things.pdf.    You should not go back to work until you meet the guidelines above for ending your home isolation. You don't need to be retested for COVID-19 before going back to work--studies show that you won't spread the virus if it's been at least 10 days since your symptoms started (or 20 days, if you have a weak immune system).    Employers: This document serves as formal notice of your employee's medical guidelines for going back to work. They must meet the above guidelines before going back to work in person.    How can I take care of myself?    1. Get lots of rest. Drink extra fluids (unless a doctor has told you not to).    2. Take Tylenol (acetaminophen) for fever or pain.  If you have liver or kidney problems, ask your family doctor if it's okay to take Tylenol.     Take either:     650 mg (two 325 mg pills) every 4 to 6 hours, or     1,000 mg (two 500 mg pills) every 8 hours as needed.     Note: Don't take more than 3,000 mg in one day. Acetaminophen is found in many medicines (both prescribed and over-the-counter medicines). Read all labels to be sure you don't take too much.    For children, check the Tylenol bottle for the right dose (based on their age or weight).    3. If you have other health problems (like cancer, heart failure, an organ transplant or severe kidney disease): Call your specialty clinic if you don't feel better in the next 2 days.    4. Know when to call 911: Emergency warning signs include:    Trouble breathing or shortness of breath    Pain or pressure in the chest that doesn't go away    Feeling confused like you haven't felt before, or not being able to wake up    Bluish-colored lips or face    5. Sign up for "[employer]. We know it's scary to hear that you have COVID-19. We want to track your symptoms to make sure you're okay over the next 2 weeks. Please look for an email from "[employer]--this is a free, online program that we'll use to keep in touch. To sign up, follow the link in the email. Learn more at [email or web address].    Where can I get more information?    [facility]: [email or web address]    Coronavirus Basics: www.health.[facility].[place]/diseases/coronavirus/basics.html    What to Do If You're Sick: www.cdc.gov/coronavirus/2019-ncov/about/steps-when-sick.html    Ending Home Isolation: www.cdc.gov/coronavirus/2019-ncov/hcp/disposition-in-home-patients.html     Caring for Someone with COVID-19: www.cdc.gov/coronavirus/2019-ncov/if-you-are-sick/care-for-someone.html     [facility] clinical trials (COVID-19 research studies): [email or web address]     A Positive COVID-19 letter will be sent  via [employer] or the mail. (Exception, no letters sent to Presurgerical/Preprocedure Patients)    Kathya Jordan LPN

## 2021-01-15 ENCOUNTER — HEALTH MAINTENANCE LETTER (OUTPATIENT)
Age: 51
End: 2021-01-15

## 2021-02-09 ENCOUNTER — TELEPHONE (OUTPATIENT)
Dept: PEDIATRICS | Facility: CLINIC | Age: 51
End: 2021-02-09

## 2021-02-09 ENCOUNTER — OFFICE VISIT (OUTPATIENT)
Dept: PEDIATRICS | Facility: CLINIC | Age: 51
End: 2021-02-09
Payer: COMMERCIAL

## 2021-02-09 VITALS
DIASTOLIC BLOOD PRESSURE: 97 MMHG | OXYGEN SATURATION: 100 % | WEIGHT: 217 LBS | HEART RATE: 98 BPM | HEIGHT: 71 IN | SYSTOLIC BLOOD PRESSURE: 153 MMHG | TEMPERATURE: 97.3 F | BODY MASS INDEX: 30.38 KG/M2

## 2021-02-09 DIAGNOSIS — G93.31 POST VIRAL SYNDROME: Primary | ICD-10-CM

## 2021-02-09 PROCEDURE — 99213 OFFICE O/P EST LOW 20 MIN: CPT | Performed by: INTERNAL MEDICINE

## 2021-02-09 ASSESSMENT — MIFFLIN-ST. JEOR: SCORE: 1871.44

## 2021-02-09 NOTE — PROGRESS NOTES
"    Assessment & Plan     Post viral syndrome  No new concerning symptoms, the course of the headaches and hives is improving over time.  Reassurance provided, he will follow up if new concerning symptoms develop.              BMI:   Estimated body mass index is 30.27 kg/m  as calculated from the following:    Height as of this encounter: 1.803 m (5' 11\").    Weight as of this encounter: 98.4 kg (217 lb).           Return in about 1 week (around 2/16/2021), or if symptoms worsen or fail to improve.    Mell North MD  [facility] KATHLEEN Grewal is a 50 year old who presents for the following health issues     HPI       Rash  Onset/Duration: 5 weeks   Description  Location: all over body - head is the most concerning - headaches, painful scalp, dizziness   Character: round, raised, itchy, red  Itching: moderate  Intensity:  mild  Progression of Symptoms:  improving  Accompanying signs and symptoms:   Fever: no  Body aches or joint pain: no  Sore throat symptoms: no  Recent cold symptoms: YES- covid 5 weeks ago  History:           Previous episodes of similar rash: None  New exposures:  None  Recent travel: no  Exposure to similar rash: no  Precipitating or alleviating factors: none   Therapies tried and outcome: Benadryl/diphenhydramine -  effective    Uri comes in today mostly because of concern for a severe problem, prompted by his wife. He describes the headaches that range from tingling on the scalp, to pain on the scalp, to a dull pain inside the head. Left back part of the scalp.  These have been daily, for about a week to ten days. Not severe, he is able to carry on with daily activities.     No skin tingling/itchy sensation anywhere else on his body.      Last month he had 1.5 to 2 weeks of body welts/hives that were bad, very itchy and red (toward the end of covid).  Has not had any for a week, until this morning he noticed one.     Overall he feels that these symptoms, which " "started after his covid illness, have been improving over time.  No new concerning symptoms. No respiratory symptoms.           Review of Systems   Constitutional, HEENT, cardiovascular, pulmonary, gi and gu systems are negative, except as otherwise noted.      Objective    BP (!) 153/97 (BP Location: Right arm, Cuff Size: Adult Large)   Pulse 98   Temp 97.3  F (36.3  C) (Tympanic)   Ht 1.803 m (5' 11\")   Wt 98.4 kg (217 lb)   SpO2 100%   BMI 30.27 kg/m    Body mass index is 30.27 kg/m .  Physical Exam   GENERAL: healthy, alert and no distress  EYES: Eyes grossly normal to inspection, PERRL and conjunctivae and sclerae normal  NECK: no adenopathy, no asymmetry, masses, or scars and thyroid normal to palpation  RESP: lungs clear to auscultation - no rales, rhonchi or wheezes  CV: regular rate and rhythm, normal S1 S2, no S3 or S4, no murmur, click or rub, no peripheral edema and peripheral pulses strong  MS: no gross musculoskeletal defects noted, no edema  SKIN: no suspicious lesions or rashes  NEURO: Normal strength and tone, mentation intact and speech normal  PSYCH: mentation appears normal, affect normal/bright                "

## 2021-02-09 NOTE — TELEPHONE ENCOUNTER
Spoke with the Pt who called in to report ongoing headaches since receiving a diagnosis of Covid in January.   He reports having intermittent hives as well, last episode was a week ago, widespread hives at that time.   He did treat with Benadryl, which helped. Now this AM, he woke up with a few hives on his torso. No trouble breathing or swallowing. Headache is mild to moderate. Nothing severe.     Also having some tingling on his scalp. No tingling anywhere else in the body.     Advised office visit.     Transfered to central scheduling.     Leelee Martin RN   [facility] -- Triage Nurse

## 2021-02-09 NOTE — PATIENT INSTRUCTIONS
Post-viral symptoms after covid illness are common. Headache and hives can be part of a post-viral illness. Overall it should be better over time, with less severe and less frequent symptoms.

## 2021-07-14 ENCOUNTER — ANCILLARY PROCEDURE (OUTPATIENT)
Dept: GENERAL RADIOLOGY | Facility: CLINIC | Age: 51
End: 2021-07-14
Attending: NURSE PRACTITIONER
Payer: COMMERCIAL

## 2021-07-14 ENCOUNTER — OFFICE VISIT (OUTPATIENT)
Dept: PEDIATRICS | Facility: CLINIC | Age: 51
End: 2021-07-14
Payer: COMMERCIAL

## 2021-07-14 VITALS
HEART RATE: 71 BPM | SYSTOLIC BLOOD PRESSURE: 112 MMHG | OXYGEN SATURATION: 100 % | DIASTOLIC BLOOD PRESSURE: 74 MMHG | WEIGHT: 220 LBS | BODY MASS INDEX: 30.68 KG/M2 | TEMPERATURE: 97.4 F | RESPIRATION RATE: 16 BRPM

## 2021-07-14 DIAGNOSIS — I10 ESSENTIAL HYPERTENSION WITH GOAL BLOOD PRESSURE LESS THAN 140/90: ICD-10-CM

## 2021-07-14 DIAGNOSIS — Z11.59 NEED FOR HEPATITIS C SCREENING TEST: ICD-10-CM

## 2021-07-14 DIAGNOSIS — R07.89 CHEST WALL PAIN: Primary | ICD-10-CM

## 2021-07-14 DIAGNOSIS — R07.81 RIB PAIN ON LEFT SIDE: ICD-10-CM

## 2021-07-14 DIAGNOSIS — Z11.4 ENCOUNTER FOR SCREENING FOR HIV: ICD-10-CM

## 2021-07-14 PROCEDURE — 80053 COMPREHEN METABOLIC PANEL: CPT | Performed by: NURSE PRACTITIONER

## 2021-07-14 PROCEDURE — 71046 X-RAY EXAM CHEST 2 VIEWS: CPT | Performed by: RADIOLOGY

## 2021-07-14 PROCEDURE — 80061 LIPID PANEL: CPT | Performed by: NURSE PRACTITIONER

## 2021-07-14 PROCEDURE — 86803 HEPATITIS C AB TEST: CPT | Performed by: NURSE PRACTITIONER

## 2021-07-14 PROCEDURE — 93000 ELECTROCARDIOGRAM COMPLETE: CPT | Performed by: NURSE PRACTITIONER

## 2021-07-14 PROCEDURE — 99213 OFFICE O/P EST LOW 20 MIN: CPT | Performed by: NURSE PRACTITIONER

## 2021-07-14 PROCEDURE — 36415 COLL VENOUS BLD VENIPUNCTURE: CPT | Performed by: NURSE PRACTITIONER

## 2021-07-14 PROCEDURE — 87389 HIV-1 AG W/HIV-1&-2 AB AG IA: CPT | Performed by: NURSE PRACTITIONER

## 2021-07-14 NOTE — PROGRESS NOTES
"Assessment & Plan     Chest wall pain  Likely musculoskeletal pain d/t reproducible  Will get EKG and chest xray  Refer to [facility] if needed  EKG sinus bradycardia     - EKG 12-lead complete w/read - Clinics  - XR Chest 2 Views    Rib pain on left side  Likely musculoskeletal pain d/t reproducible  Will get EKG and chest xray  Refer to [facility] if needed  OTC ibuprofen as needed    - XR Chest 2 Views    Essential hypertension with goal blood pressure less than 140/90  - Comprehensive metabolic panel  - Lipid panel reflex to direct LDL Fasting    Need for hepatitis C screening test  - Hepatitis C Screen Reflex to HCV RNA Quant and Genotype    Encounter for screening for HIV  - HIV Antigen Antibody Combo; Future  - HIV Antigen Antibody Combo      I spent a total of 15 minutes on the day of the visit.   Time spent doing chart review, history and exam, documentation and further activities per the note       BMI:   Estimated body mass index is 30.68 kg/m  as calculated from the following:    Height as of 2/9/21: 1.803 m (5' 11\").    Weight as of this encounter: 99.8 kg (220 lb).   Weight management plan: Discussed healthy diet and exercise guidelines    See Patient Instructions  Return if symptoms worsen or fail to improve.    Michela Dobson NP  [facility] KATHLEEN Grewal is a 50 year old who presents for the following health issues     HPI     Concern - Pain while taking big deep breathes  Onset: 2 months  Description: hurts only when taking deep breath  Intensity: mild  Progression of Symptoms:  same  Accompanying Signs & Symptoms: none  Previous history of similar problem: no  Precipitating factors:        Worsened by:   Alleviating factors:        Improved by:   Therapies tried and outcome:  none       Pain is staying the same  Pain starts in his left side and moves to left back  Pain is better when he does not take deep breaths  Pain does not get worse with exercise  Does exercise 5x weekly--Nando Pena; " unsure if he pulled a muscle  Denies SOB  Denies GERD symptoms  Can reproduce the pain with palpation  Denies rash  Denies drug use  Denies increase in stress and anxiety  Denies trauma  Denies prior injury    Patient is worried about having a lung mass.  Fully vaccinated against COVID-19    Review of Systems   Constitutional, HEENT, cardiovascular, pulmonary, gi and gu systems are negative, except as otherwise noted.      Objective    /74 (Cuff Size: Adult Regular)   Pulse 71   Temp 97.4  F (36.3  C) (Tympanic)   Resp 16   Wt 99.8 kg (220 lb)   SpO2 100%   BMI 30.68 kg/m    Body mass index is 30.68 kg/m .     Physical Exam   GENERAL: healthy, alert and no distress  RESP: lungs clear to auscultation - no rales, rhonchi or wheezes  CV: regular rate and rhythm, normal S1 S2, no S3 or S4, no murmur, click or rub  MS: tenderness to palpation of left lower ribs and left thoracic back  PSYCH: mentation appears normal, affect normal/bright

## 2021-07-14 NOTE — PATIENT INSTRUCTIONS
It was nice seeing you today.    Please let me know if you have any questions regarding today's visit!    Take care,    SAM Dobson DNP  Family Medicine

## 2021-07-15 LAB
ALBUMIN SERPL-MCNC: 4.1 G/DL (ref 3.4–5)
ALP SERPL-CCNC: 68 U/L (ref 40–150)
ALT SERPL W P-5'-P-CCNC: 46 U/L (ref 0–70)
ANION GAP SERPL CALCULATED.3IONS-SCNC: 2 MMOL/L (ref 3–14)
AST SERPL W P-5'-P-CCNC: 21 U/L (ref 0–45)
BILIRUB SERPL-MCNC: 0.8 MG/DL (ref 0.2–1.3)
BUN SERPL-MCNC: 20 MG/DL (ref 7–30)
CALCIUM SERPL-MCNC: 9.4 MG/DL (ref 8.5–10.1)
CHLORIDE BLD-SCNC: 103 MMOL/L (ref 94–109)
CHOLEST SERPL-MCNC: 235 MG/DL
CO2 SERPL-SCNC: 31 MMOL/L (ref 20–32)
CREAT SERPL-MCNC: 1.16 MG/DL (ref 0.66–1.25)
FASTING STATUS PATIENT QL REPORTED: YES
GFR SERPL CREATININE-BSD FRML MDRD: 74 ML/MIN/1.73M2
GLUCOSE BLD-MCNC: 97 MG/DL (ref 70–99)
HCV AB SERPL QL IA: NONREACTIVE
HDLC SERPL-MCNC: 45 MG/DL
HIV 1+2 AB+HIV1 P24 AG SERPL QL IA: NONREACTIVE
LDLC SERPL CALC-MCNC: 169 MG/DL
NONHDLC SERPL-MCNC: 190 MG/DL
POTASSIUM BLD-SCNC: 4.8 MMOL/L (ref 3.4–5.3)
PROT SERPL-MCNC: 7.4 G/DL (ref 6.8–8.8)
SODIUM SERPL-SCNC: 136 MMOL/L (ref 133–144)
TRIGL SERPL-MCNC: 105 MG/DL

## 2021-08-09 ENCOUNTER — MYC MEDICAL ADVICE (OUTPATIENT)
Dept: PEDIATRICS | Facility: CLINIC | Age: 51
End: 2021-08-09

## 2021-08-09 DIAGNOSIS — R07.9 LEFT-SIDED CHEST PAIN: Primary | ICD-10-CM

## 2021-08-11 NOTE — TELEPHONE ENCOUNTER
Patient notified. Made lab appt. Thursday.  [place], [facility]

## 2021-08-11 NOTE — TELEPHONE ENCOUNTER
MA/WILY-  Please assist pt with scheduling lab appt. Thank you! Christine Tucker RN on 8/11/2021 at 12:20 PM

## 2021-08-11 NOTE — TELEPHONE ENCOUNTER
Discussed with pt.   Several month hx of left lower CP with inspiration.  No exertional CP, feels better with exercise.   Martial arts classes (jujitsu, grappling) do not trigger sx.   No GERD sx.  Denies cough or SOB    CP ddx cardiac, musculoskeletal (muscular strain, costochondritis, chest wall pain), pulm (asthma, pneumonia, PE), GERD/esophagitis    A/p: Pleuritic CP for several months. Recent normal CXR, EKG.  Will have pt RTc for repeat labdraw    Please call pt to schedule labdraw this week (nonfasting)

## 2021-08-12 ENCOUNTER — LAB (OUTPATIENT)
Dept: LAB | Facility: CLINIC | Age: 51
End: 2021-08-12
Payer: COMMERCIAL

## 2021-08-12 DIAGNOSIS — R07.9 LEFT-SIDED CHEST PAIN: ICD-10-CM

## 2021-08-12 LAB — D DIMER PPP FEU-MCNC: <0.27 UG/ML FEU (ref 0–0.5)

## 2021-08-12 PROCEDURE — 36415 COLL VENOUS BLD VENIPUNCTURE: CPT

## 2021-08-12 PROCEDURE — 85379 FIBRIN DEGRADATION QUANT: CPT

## 2021-08-23 DIAGNOSIS — F41.1 GAD (GENERALIZED ANXIETY DISORDER): ICD-10-CM

## 2021-08-23 DIAGNOSIS — I10 ESSENTIAL HYPERTENSION WITH GOAL BLOOD PRESSURE LESS THAN 140/90: ICD-10-CM

## 2021-08-25 RX ORDER — LISINOPRIL AND HYDROCHLOROTHIAZIDE 12.5; 2 MG/1; MG/1
TABLET ORAL
Qty: 90 TABLET | Refills: 2 | Status: SHIPPED | OUTPATIENT
Start: 2021-08-25 | End: 2022-05-24

## 2021-08-26 RX ORDER — CITALOPRAM HYDROBROMIDE 20 MG/1
TABLET ORAL
Qty: 90 TABLET | Refills: 3 | Status: SHIPPED | OUTPATIENT
Start: 2021-08-26 | End: 2022-08-31

## 2021-09-04 ENCOUNTER — HEALTH MAINTENANCE LETTER (OUTPATIENT)
Age: 51
End: 2021-09-04

## 2022-02-19 ENCOUNTER — HEALTH MAINTENANCE LETTER (OUTPATIENT)
Age: 52
End: 2022-02-19

## 2022-08-31 DIAGNOSIS — F41.1 GAD (GENERALIZED ANXIETY DISORDER): ICD-10-CM

## 2022-08-31 RX ORDER — CITALOPRAM HYDROBROMIDE 20 MG/1
TABLET ORAL
Qty: 90 TABLET | Refills: 3 | Status: SHIPPED | OUTPATIENT
Start: 2022-08-31 | End: 2023-09-08

## 2022-08-31 NOTE — TELEPHONE ENCOUNTER
Routing refill request to provider for review/approval because:  Drug interaction warning  Drug-Drug: citalopram and naproxen    Rasheeda VALENZUELA RN   Patient Advocate Liaison (PAL)  [facility] [place]

## 2022-09-01 DIAGNOSIS — I10 ESSENTIAL HYPERTENSION WITH GOAL BLOOD PRESSURE LESS THAN 140/90: ICD-10-CM

## 2022-09-01 RX ORDER — LISINOPRIL AND HYDROCHLOROTHIAZIDE 12.5; 2 MG/1; MG/1
TABLET ORAL
Qty: 90 TABLET | Refills: 0 | Status: SHIPPED | OUTPATIENT
Start: 2022-09-01 | End: 2022-09-21

## 2022-09-01 NOTE — TELEPHONE ENCOUNTER
Patient has upcoming appointment 9/21/22. Patient to discuss further refills at that time. Prescription approved per [facility] Refill Protocol.  Andrea COOPER RN

## 2022-09-18 ASSESSMENT — ENCOUNTER SYMPTOMS
FEVER: 0
ARTHRALGIAS: 0
COUGH: 1
NAUSEA: 0
JOINT SWELLING: 0
HEMATURIA: 0
PALPITATIONS: 0
CONSTIPATION: 0
MYALGIAS: 0
ABDOMINAL PAIN: 0
CHILLS: 0
HEADACHES: 0
DYSURIA: 0
PARESTHESIAS: 0
HEMATOCHEZIA: 0
FREQUENCY: 0
WEAKNESS: 0
EYE PAIN: 0
DIARRHEA: 0
NERVOUS/ANXIOUS: 0
HEARTBURN: 0
DIZZINESS: 1
SHORTNESS OF BREATH: 0

## 2022-09-21 ENCOUNTER — OFFICE VISIT (OUTPATIENT)
Dept: PEDIATRICS | Facility: CLINIC | Age: 52
End: 2022-09-21
Payer: COMMERCIAL

## 2022-09-21 VITALS
HEART RATE: 83 BPM | RESPIRATION RATE: 12 BRPM | DIASTOLIC BLOOD PRESSURE: 78 MMHG | BODY MASS INDEX: 30.52 KG/M2 | HEIGHT: 70 IN | OXYGEN SATURATION: 99 % | WEIGHT: 213.2 LBS | TEMPERATURE: 98.2 F | SYSTOLIC BLOOD PRESSURE: 120 MMHG

## 2022-09-21 DIAGNOSIS — Z12.11 SCREEN FOR COLON CANCER: ICD-10-CM

## 2022-09-21 DIAGNOSIS — M72.2 PLANTAR FASCIITIS: ICD-10-CM

## 2022-09-21 DIAGNOSIS — R73.03 PREDIABETES: ICD-10-CM

## 2022-09-21 DIAGNOSIS — R07.9 CHEST PAIN, UNSPECIFIED TYPE: ICD-10-CM

## 2022-09-21 DIAGNOSIS — Z12.5 SCREENING FOR PROSTATE CANCER: ICD-10-CM

## 2022-09-21 DIAGNOSIS — Z00.00 ROUTINE GENERAL MEDICAL EXAMINATION AT A HEALTH CARE FACILITY: Primary | ICD-10-CM

## 2022-09-21 DIAGNOSIS — E78.5 HYPERLIPIDEMIA WITH TARGET LDL LESS THAN 130: ICD-10-CM

## 2022-09-21 DIAGNOSIS — I10 ESSENTIAL HYPERTENSION WITH GOAL BLOOD PRESSURE LESS THAN 140/90: ICD-10-CM

## 2022-09-21 PROCEDURE — 90471 IMMUNIZATION ADMIN: CPT | Performed by: INTERNAL MEDICINE

## 2022-09-21 PROCEDURE — 90715 TDAP VACCINE 7 YRS/> IM: CPT | Performed by: INTERNAL MEDICINE

## 2022-09-21 PROCEDURE — 99396 PREV VISIT EST AGE 40-64: CPT | Mod: 25 | Performed by: INTERNAL MEDICINE

## 2022-09-21 PROCEDURE — 99214 OFFICE O/P EST MOD 30 MIN: CPT | Mod: 25 | Performed by: INTERNAL MEDICINE

## 2022-09-21 RX ORDER — LISINOPRIL AND HYDROCHLOROTHIAZIDE 12.5; 2 MG/1; MG/1
1 TABLET ORAL DAILY
Qty: 90 TABLET | Refills: 3 | Status: SHIPPED | OUTPATIENT
Start: 2022-09-21 | End: 2023-08-30

## 2022-09-21 SDOH — ECONOMIC STABILITY: FOOD INSECURITY: WITHIN THE PAST 12 MONTHS, YOU WORRIED THAT YOUR FOOD WOULD RUN OUT BEFORE YOU GOT MONEY TO BUY MORE.: NEVER TRUE

## 2022-09-21 SDOH — ECONOMIC STABILITY: TRANSPORTATION INSECURITY
IN THE PAST 12 MONTHS, HAS THE LACK OF TRANSPORTATION KEPT YOU FROM MEDICAL APPOINTMENTS OR FROM GETTING MEDICATIONS?: NO

## 2022-09-21 SDOH — ECONOMIC STABILITY: INCOME INSECURITY: HOW HARD IS IT FOR YOU TO PAY FOR THE VERY BASICS LIKE FOOD, HOUSING, MEDICAL CARE, AND HEATING?: NOT HARD AT ALL

## 2022-09-21 SDOH — HEALTH STABILITY: PHYSICAL HEALTH: ON AVERAGE, HOW MANY MINUTES DO YOU ENGAGE IN EXERCISE AT THIS LEVEL?: 60 MIN

## 2022-09-21 SDOH — ECONOMIC STABILITY: INCOME INSECURITY: IN THE LAST 12 MONTHS, WAS THERE A TIME WHEN YOU WERE NOT ABLE TO PAY THE MORTGAGE OR RENT ON TIME?: NO

## 2022-09-21 SDOH — ECONOMIC STABILITY: FOOD INSECURITY: WITHIN THE PAST 12 MONTHS, THE FOOD YOU BOUGHT JUST DIDN'T LAST AND YOU DIDN'T HAVE MONEY TO GET MORE.: NEVER TRUE

## 2022-09-21 SDOH — HEALTH STABILITY: PHYSICAL HEALTH: ON AVERAGE, HOW MANY DAYS PER WEEK DO YOU ENGAGE IN MODERATE TO STRENUOUS EXERCISE (LIKE A BRISK WALK)?: 3 DAYS

## 2022-09-21 SDOH — ECONOMIC STABILITY: TRANSPORTATION INSECURITY
IN THE PAST 12 MONTHS, HAS LACK OF TRANSPORTATION KEPT YOU FROM MEETINGS, WORK, OR FROM GETTING THINGS NEEDED FOR DAILY LIVING?: NO

## 2022-09-21 ASSESSMENT — ENCOUNTER SYMPTOMS
FEVER: 0
DIARRHEA: 0
HEMATOCHEZIA: 0
EYE PAIN: 0
MYALGIAS: 0
PALPITATIONS: 0
WEAKNESS: 0
PARESTHESIAS: 0
NERVOUS/ANXIOUS: 0
CONSTIPATION: 0
CHILLS: 0
HEMATURIA: 0
JOINT SWELLING: 0
SHORTNESS OF BREATH: 0
DYSURIA: 0
DIZZINESS: 1
COUGH: 1
ARTHRALGIAS: 0
FREQUENCY: 0
HEARTBURN: 0
NAUSEA: 0
HEADACHES: 0
ABDOMINAL PAIN: 0

## 2022-09-21 ASSESSMENT — LIFESTYLE VARIABLES
AUDIT-C TOTAL SCORE: 0
HOW OFTEN DO YOU HAVE SIX OR MORE DRINKS ON ONE OCCASION: NEVER
SKIP TO QUESTIONS 9-10: 1
HOW OFTEN DO YOU HAVE A DRINK CONTAINING ALCOHOL: NEVER
HOW MANY STANDARD DRINKS CONTAINING ALCOHOL DO YOU HAVE ON A TYPICAL DAY: PATIENT DOES NOT DRINK

## 2022-09-21 ASSESSMENT — PAIN SCALES - GENERAL: PAINLEVEL: NO PAIN (0)

## 2022-09-21 ASSESSMENT — SOCIAL DETERMINANTS OF HEALTH (SDOH)
DO YOU BELONG TO ANY CLUBS OR ORGANIZATIONS SUCH AS CHURCH GROUPS UNIONS, FRATERNAL OR ATHLETIC GROUPS, OR SCHOOL GROUPS?: YES
HOW OFTEN DO YOU GET TOGETHER WITH FRIENDS OR RELATIVES?: TWICE A WEEK
IN A TYPICAL WEEK, HOW MANY TIMES DO YOU TALK ON THE PHONE WITH FAMILY, FRIENDS, OR NEIGHBORS?: TWICE A WEEK
HOW OFTEN DO YOU ATTEND CHURCH OR RELIGIOUS SERVICES?: MORE THAN 4 TIMES PER YEAR

## 2022-09-21 NOTE — PATIENT INSTRUCTIONS
"Chest pain.  Suspect acid reflux.  Start omeprazole.  Review reflux handout.  Start slowly switching to decaf (may take several weeks)   if chest pains fail to improve in the next 2 weeks send me a mychart note--we can discuss other testing    Plantar fasciitis.   Review handout.  More supportive shoes and heel inserts (ex\"superfeet\" brand inserts or other).   If persistent pain we can have you see podiatry    You will be contacted to schedule your colonoscopy    Congratulations!   Enjoy your residential!      Preventive Health Recommendations  Male Ages 50 - 64    Yearly exam:             See your health care provider every year in order to  o   Review health changes.   o   Discuss preventive care.    o   Review your medicines if your doctor has prescribed any.   Have a cholesterol test every 5 years, or more frequently if you are at risk for high cholesterol/heart disease.   Have a diabetes test (fasting glucose) every three years. If you are at risk for diabetes, you should have this test more often.   Have a colonoscopy at age 50, or have a yearly FIT test (stool test). These exams will check for colon cancer.    Talk with your health care provider about whether or not a prostate cancer screening test (PSA) is right for you.  You should be tested each year for STDs (sexually transmitted diseases), if you re at risk.     Shots: Get a flu shot each year. Get a tetanus shot every 10 years.     Nutrition:  Eat at least 5 servings of fruits and vegetables daily.   Eat whole-grain bread, whole-wheat pasta and brown rice instead of white grains and rice.   Get adequate Calcium and Vitamin D.     Lifestyle  Exercise for at least 150 minutes a week (30 minutes a day, 5 days a week). This will help you control your weight and prevent disease.   Limit alcohol to one drink per day.   No smoking.   Wear sunscreen to prevent skin cancer.   See your dentist every six months for an exam and cleaning.   See your eye doctor every " 1 to 2 years.

## 2022-09-21 NOTE — PROGRESS NOTES
SUBJECTIVE:   CC: Uri is an 52 year old who presents for preventative health visit.   Patient has been advised of split billing requirements and indicates understanding: Yes     Healthy Habits:     Getting at least 3 servings of Calcium per day:  Yes    Bi-annual eye exam:  Yes    Dental care twice a year:  NO    Sleep apnea or symptoms of sleep apnea:  None    Diet:  Regular (no restrictions)    Frequency of exercise:  2-3 days/week    Duration of exercise:  30-45 minutes    Taking medications regularly:  Yes    Medication side effects:  None    PHQ-2 Total Score: 0    Additional concerns today:  No    Discuss new pain in chest, moles on head - foot concerns - intermittent pain in metatarsal area/worse with walking and worse each morning  Today's PHQ-2 Score:   PHQ-2 ( 1999 [employer]) 9/21/2022   Q1: Little interest or pleasure in doing things 0   Q2: Feeling down, depressed or hopeless 0   PHQ-2 Score 0   PHQ-2 Total Score (12-17 Years)- Positive if 3 or more points; Administer PHQ-A if positive -   Q1: Little interest or pleasure in doing things Not at all   Q2: Feeling down, depressed or hopeless Not at all   PHQ-2 Score 0       Abuse: Current or Past(Physical, Sexual or Emotional)- No  Do you feel safe in your environment? Yes    Have you ever done Advance Care Planning? (For example, a Health Directive, POLST, or a discussion with a medical provider or your loved ones about your wishes): No, advance care planning information given to patient to review.  Patient declined advance care planning discussion at this time.    Social History     Tobacco Use     Smoking status: Never Smoker     Smokeless tobacco: Never Used   Substance Use Topics     Alcohol use: Yes     Alcohol/week: 0.0 standard drinks         Alcohol Use 9/18/2022   Prescreen: >3 drinks/day or >7 drinks/week? Not Applicable   Prescreen: >3 drinks/day or >7 drinks/week? -       Last PSA: No results found for: PSA    Reviewed orders with patient.  Reviewed health maintenance and updated orders accordingly - Yes  Patient Active Problem List   Diagnosis     Prediabetes     Obesity     Hyperlipidemia with target LDL less than 130     Essential hypertension with goal blood pressure less than 140/90     ISH (generalized anxiety disorder)     History reviewed. No pertinent surgical history.    Social History     Tobacco Use     Smoking status: Never Smoker     Smokeless tobacco: Never Used   Substance Use Topics     Alcohol use: Yes     Alcohol/week: 0.0 standard drinks     Family History   Problem Relation Age of Onset     Alcohol/Drug Father      Cancer Father         pancreatic         Current Outpatient Medications   Medication Sig Dispense Refill     citalopram (CELEXA) 20 MG tablet TAKE 1 TABLET BY MOUTH EVERY DAY 90 tablet 3     lisinopril-hydrochlorothiazide (ZESTORETIC) 20-12.5 MG tablet Take 1 tablet by mouth daily 90 tablet 3     naproxen (NAPROSYN) 500 MG tablet Take 1 tablet (500 mg) by mouth 2 times daily (with meals) 60 tablet 1             sildenafil (REVATIO) 20 MG tablet TAKE 2 TO 5 TABLETS BY MOUTH ONCE DAILY AS NEEDED 30  MINUTES  TO  2  HOURS  PRIOR  TO  ACTIVITY 30 tablet 11       Reviewed and updated as needed this visit by clinical staff   Tobacco  Allergies  Meds   Med Hx  Surg Hx  Fam Hx  Soc Hx          Reviewed and updated as needed this visit by Provider                   Patient Active Problem List   Diagnosis     Prediabetes     Obesity     Hyperlipidemia with target LDL less than 130     Essential hypertension with goal blood pressure less than 140/90     ISH (generalized anxiety disorder)     Current Outpatient Medications   Medication Sig Dispense Refill     citalopram (CELEXA) 20 MG tablet TAKE 1 TABLET BY MOUTH EVERY DAY 90 tablet 3     lisinopril-hydrochlorothiazide (ZESTORETIC) 20-12.5 MG tablet Take 1 tablet by mouth daily 90 tablet 3     naproxen (NAPROSYN) 500 MG tablet Take 1 tablet (500 mg) by mouth 2 times daily  "(with meals) 60 tablet 1     omeprazole (PRILOSEC) 20 MG DR capsule Take 1 capsule twice daily for 2 weeks then take 1 capsule daily 60 capsule 1     sildenafil (REVATIO) 20 MG tablet TAKE 2 TO 5 TABLETS BY MOUTH ONCE DAILY AS NEEDED 30  MINUTES  TO  2  HOURS  PRIOR  TO  ACTIVITY 30 tablet 11        Review of Systems   Constitutional: Negative for chills and fever.   HENT: Negative for congestion, ear pain and hearing loss.    Eyes: Negative for pain and visual disturbance.   Respiratory: Positive for cough. Negative for shortness of breath.    Cardiovascular: Positive for chest pain. Negative for palpitations and peripheral edema.   Gastrointestinal: Negative for abdominal pain, constipation, diarrhea, heartburn, hematochezia and nausea.   Genitourinary: Negative for dysuria, frequency, genital sores, hematuria, impotence, penile discharge and urgency.   Musculoskeletal: Negative for arthralgias, joint swelling and myalgias.   Skin: Negative for rash.   Neurological: Positive for dizziness. Negative for weakness, headaches and paresthesias.   Psychiatric/Behavioral: Negative for mood changes. The patient is not nervous/anxious.        OBJECTIVE:   /78 (BP Location: Right arm, Patient Position: Sitting, Cuff Size: Adult Large)   Pulse 83   Temp 98.2  F (36.8  C) (Tympanic)   Resp 12   Ht 1.778 m (5' 10\")   Wt 96.7 kg (213 lb 3.2 oz)   SpO2 99%   BMI 30.59 kg/m      Physical Exam  GENERAL: healthy, alert and no distress  EYES: Eyes grossly normal to inspection, PERRL and conjunctivae and sclerae normal  HENT: ear canals and TM's normal, nose and mouth without ulcers or lesions  NECK: no adenopathy, no asymmetry, masses, or scars and thyroid normal to palpation  RESP: lungs clear to auscultation - no rales, rhonchi or wheezes  CV: regular rate and rhythm, normal S1 S2, no S3 or S4, no murmur, click or rub, no peripheral edema and peripheral pulses strong  ABDOMEN: soft, nontender, no hepatosplenomegaly, " "no masses and bowel sounds normal  MS: no gross musculoskeletal defects noted, no edema  SKIN: no suspicious lesions or rashes  NEURO: Normal strength and tone, mentation intact and speech normal  PSYCH: mentation appears normal, affect normal/bright        ASSESSMENT/PLAN:   (Z00.00) Routine general medical examination at a health care facility  (primary encounter diagnosis)    (R07.9) Chest pain, unspecified type  Comment:    Episodic left-sided chest discomfort for the past few years.  Describes a mild aching sensation left flank and left upper quadrant seems to worsen with inspiration, however denies shortness of breath or cough.  Prior evaluation-normal chest x-ray in 2021.  Has no symptoms with exercise and no associated nausea or diaphoresis when episodes occur.  Uri has questions about additional testing today.  Habits: Non-smoker, occasional alcohol.  Drinks regular coffee \"1 pot\" per day or approximately 12 cups of coffee per day.  Differential discussed- suspect gastroesophageal reflux.  Diagnosis discussed/recommended lifestyle modification, reducing caffeine-- recommended trial of omeprazole 20 mg twice daily for the next 2 weeks.  If no improvement patient will follow-up and we discussed additional testing  Plan: omeprazole (PRILOSEC) 20 MG DR capsule          (M72.2) Plantar fasciitis  Comment:   Plan: Foot pain characteristic for plantar fasciitis.  Recommended footwear with a supportive sole and heel inserts.A handout with pertinent patient health education information is given.  If no improvement over the next month or 2 discussed podiatry referral    (R73.03) Prediabetes  Comment:   Plan: Follow-up lab work today, continues to work on weight management and exercise    (I10) Essential hypertension with goal blood pressure less than 140/90  Comment: Well-controlled  Plan: lisinopril-hydrochlorothiazide (ZESTORETIC)         20-12.5 MG tablet          (E78.5) Hyperlipidemia with target LDL less than " "130  Comment: Return for fasting lab work   plan: Comprehensive metabolic panel (BMP + Alb, Alk         Phos, ALT, AST, Total. Bili, TP), Lipid panel         reflex to direct LDL Fasting          (Z12.11) Screen for colon cancer  Comment:   Plan: Colonoscopy Screening  Referral          (Z12.5) Screening for prostate cancer  Comment:   Plan: PSA, screen            COUNSELING:   Reviewed preventive health counseling, as reflected in patient instructions       Regular exercise       Healthy diet/nutrition       Colorectal cancer screening       Prostate cancer screening    Estimated body mass index is 30.59 kg/m  as calculated from the following:    Height as of this encounter: 1.778 m (5' 10\").    Weight as of this encounter: 96.7 kg (213 lb 3.2 oz).     Weight management plan: Discussed healthy diet and exercise guidelines    He reports that he has never smoked. He has never used smokeless tobacco.      Counseling Resources:  ATP IV Guidelines  Pooled Cohorts Equation Calculator  FRAX Risk Assessment  ICSI Preventive Guidelines  Dietary Guidelines for Americans, 2010  USDA's MyPlate  ASA Prophylaxis  Lung CA Screening    John Puri MD  [facility]  "

## 2022-09-23 ENCOUNTER — LAB (OUTPATIENT)
Dept: LAB | Facility: CLINIC | Age: 52
End: 2022-09-23
Payer: COMMERCIAL

## 2022-09-23 DIAGNOSIS — E78.5 HYPERLIPIDEMIA WITH TARGET LDL LESS THAN 130: ICD-10-CM

## 2022-09-23 DIAGNOSIS — Z12.5 SCREENING FOR PROSTATE CANCER: ICD-10-CM

## 2022-09-23 LAB
ALBUMIN SERPL-MCNC: 4.1 G/DL (ref 3.4–5)
ALP SERPL-CCNC: 72 U/L (ref 40–150)
ALT SERPL W P-5'-P-CCNC: 59 U/L (ref 0–70)
ANION GAP SERPL CALCULATED.3IONS-SCNC: 5 MMOL/L (ref 3–14)
AST SERPL W P-5'-P-CCNC: 20 U/L (ref 0–45)
BILIRUB SERPL-MCNC: 0.7 MG/DL (ref 0.2–1.3)
BUN SERPL-MCNC: 17 MG/DL (ref 7–30)
CALCIUM SERPL-MCNC: 9.2 MG/DL (ref 8.5–10.1)
CHLORIDE BLD-SCNC: 105 MMOL/L (ref 94–109)
CHOLEST SERPL-MCNC: 207 MG/DL
CO2 SERPL-SCNC: 29 MMOL/L (ref 20–32)
CREAT SERPL-MCNC: 1.13 MG/DL (ref 0.66–1.25)
FASTING STATUS PATIENT QL REPORTED: YES
GFR SERPL CREATININE-BSD FRML MDRD: 79 ML/MIN/1.73M2
GLUCOSE BLD-MCNC: 118 MG/DL (ref 70–99)
HDLC SERPL-MCNC: 41 MG/DL
LDLC SERPL CALC-MCNC: 139 MG/DL
NONHDLC SERPL-MCNC: 166 MG/DL
POTASSIUM BLD-SCNC: 4.6 MMOL/L (ref 3.4–5.3)
PROT SERPL-MCNC: 7.7 G/DL (ref 6.8–8.8)
PSA SERPL-MCNC: 0.85 UG/L (ref 0–4)
SODIUM SERPL-SCNC: 139 MMOL/L (ref 133–144)
TRIGL SERPL-MCNC: 133 MG/DL

## 2022-09-23 PROCEDURE — 36415 COLL VENOUS BLD VENIPUNCTURE: CPT

## 2022-09-23 PROCEDURE — 80053 COMPREHEN METABOLIC PANEL: CPT

## 2022-09-23 PROCEDURE — G0103 PSA SCREENING: HCPCS

## 2022-09-23 PROCEDURE — 80061 LIPID PANEL: CPT

## 2022-09-28 ENCOUNTER — TELEPHONE (OUTPATIENT)
Dept: GASTROENTEROLOGY | Facility: CLINIC | Age: 52
End: 2022-09-28

## 2022-09-28 NOTE — TELEPHONE ENCOUNTER
Screening Questions    BlueKIND OF PREP RedLOCATION [review exclusion criteria] GreenSEDATION TYPE      1. Are you active on mychart? y    2. What insurance is in the chart? BCBS     3.   Ordering/Referring Provider: John Puri MD       4. BMI   (If greater than 40 review exclusion criteria [PAC APPT IF [MAC] @ [facility])  30.5  [If yes, BMI OVER 40-EXTENDED PREP]      **(Sedation review/consideration needed)**  Do you have a legal guardian or Medical Power of    and/or are you able to give consent for your medical care?     Can give consent    5. Have you had a positive covid test in the last 90 days?   n -     6.  Are you currently on dialysis?   n [ If yes, G-PREP & HOSPITAL setting ONLY]     7.  Do you have chronic kidney disease?  n [ If yes, G-PREP ]    8.   Do you have a diagnosis of diabetes?   n   [ If yes, G-PREP ]    9.  On a regular basis do you go 3-5 days between bowel movements?   n   [ If yes, EXTENDED PREP]    10.  Are you taking any prescription pain medications on a routine schedule?    n -  [ If yes, EXTENDED PREP] [If yes, MAC]      11.   Do you have any chemical dependencies such as alcohol, street drugs, or methadone?    n [If yes, MAC]    12.   Do you have any history of post-traumatic stress syndrome, severe anxiety or history of psychosis?    n  [If yes, MAC]    13.  [FEMALES] Are you currently pregnant? n/a    If yes, how many weeks?       Respiratory/Heart Screening:  [If yes to any of the following HOSPITAL setting only]     14. Do you have Pulmonary Hypertension [Lungs]?   n       15. Do you have UNCONTROLLED asthma?   n     16.  Do you use daily home oxygen?  n      17. Do you have mod to severe Obstructive Sleep Apnea?         (OKAY @ [facility]  [facility]  SH  PH  RI  MG - if pt is not on OXYGEN)  n      18.   Have you had a heart or lung transplant?   n      19.   Have you had a stroke or Transient ischemic attack (TIA - aka  mini stroke ) within 6 months?  (If yes, please  review exclusion criteria)  n     20.   In the past 6 months, have you had any heart related issues including cardiomyopathy or heart attack?   n           If yes, did it require cardiac stenting or other implantable device?         21.   Do you have any implantable devices in your body (pacemaker, defib, LVAD)? (If yes, please review exclusion criteria)  n   22.  Do you take the medication Phentermine?     Yes-> Hold for 7 days before procedure.  Please consult your prescribing provider if you have questions about holding this medication.     No-> Continue to next question.    23. Do you take nitroglycerin?   n           If yes, how often?   (if yes, HOSPITAL setting ONLY)    24.  Are you currently taking any blood thinners?    [If yes, INFORM patient to follw up w/ ORDERING PROVIDER FOR BRIDGING INSTRUCTIONS]     n    25.   Do you transfer independently?                (If NO, please HOSPITAL setting ONLY)  y    26.   Preferred LOCAL Pharmacy for Pre Prescription:         [facility]/PHARMACY #[id number] - [place], [place] - [address]      Scheduling Details  (Please ask for phone number if not scheduled by patient)      Caller : Geovanni Galvez    Date of Procedure: 11/9  Surgeon: Abhay  Location:         Sedation Type: MODERATE l   Conscious Sedation- Needs  for 6 hours after the procedure  MAC/General-Needs  for 24 hours after procedure    n :[Pre-op Required] at [facility]  SH  MG and OR for MAC sedation   (advise patient they will need a pre-op WITH IN 30 DAYS of procedure date)     Type of Procedure Scheduled:   Lower Endoscopy [Colonoscopy]    Which Colonoscopy Prep was Sent?:   Gprep - mag citrate recall      [facility] PATIENTS & [facility] PATIENTS NEEDS EXTENDED PREP       Informed patient they will need an adult  y  Cannot take any type of public or medical transportation alone    Pre-Procedure Covid test to be completed at [facility] or Externally: home test  **INFORMED OF  HOME TESTING & LAB OPTION**        Confirmed Nurse will call to complete assessment y    Additional comments:

## 2022-09-30 ENCOUNTER — TELEPHONE (OUTPATIENT)
Dept: GASTROENTEROLOGY | Facility: CLINIC | Age: 52
End: 2022-09-30

## 2022-09-30 NOTE — TELEPHONE ENCOUNTER
Caller: Geovanni Galvez    Procedure: COLONOSCOPY    Date, Location, and Surgeon of Procedure Cancelled: 11/09/22  DR YADAV    Ordering Provider:John Puri MD    Reason for cancel (please be detailed, any staff messages or encounters to note?): UBALDO DO TO WORK CONFLICT        Rescheduled: Y     If rescheduled:    Date: 11/17/22   Location:    Prep Resent: Y(changes to prep?)   Covid Test Rescheduled: Y   Note any change or update to original order/sedation: NA

## 2022-10-06 DIAGNOSIS — R07.9 CHEST PAIN, UNSPECIFIED TYPE: ICD-10-CM

## 2022-10-07 NOTE — TELEPHONE ENCOUNTER
Prescription approved per [facility] Refill Protocol.  Viki Zhang, RN, BSN, PHN  [facility]

## 2022-10-16 ENCOUNTER — HEALTH MAINTENANCE LETTER (OUTPATIENT)
Age: 52
End: 2022-10-16

## 2022-10-26 RX ORDER — BISACODYL 5 MG
TABLET, DELAYED RELEASE (ENTERIC COATED) ORAL
Qty: 4 TABLET | Refills: 0 | Status: SHIPPED | OUTPATIENT
Start: 2022-10-26 | End: 2023-11-06

## 2022-11-17 ENCOUNTER — HOSPITAL ENCOUNTER (OUTPATIENT)
Facility: CLINIC | Age: 52
Discharge: HOME | End: 2022-11-17
Attending: INTERNAL MEDICINE | Admitting: INTERNAL MEDICINE
Payer: COMMERCIAL

## 2022-11-17 VITALS
SYSTOLIC BLOOD PRESSURE: 102 MMHG | RESPIRATION RATE: 16 BRPM | DIASTOLIC BLOOD PRESSURE: 65 MMHG | WEIGHT: 215 LBS | BODY MASS INDEX: 30.1 KG/M2 | HEIGHT: 71 IN | TEMPERATURE: 97.4 F | OXYGEN SATURATION: 97 % | HEART RATE: 65 BPM

## 2022-11-17 DIAGNOSIS — Z12.11 COLON CANCER SCREENING: Primary | ICD-10-CM

## 2022-11-17 LAB — COLONOSCOPY: NORMAL

## 2022-11-17 PROCEDURE — 250N000011 HC RX IP 250 OP 636: Performed by: INTERNAL MEDICINE

## 2022-11-17 PROCEDURE — G0500 MOD SEDAT ENDO SERVICE >5YRS: HCPCS | Performed by: INTERNAL MEDICINE

## 2022-11-17 PROCEDURE — G0121 COLON CA SCRN NOT HI RSK IND: HCPCS | Performed by: INTERNAL MEDICINE

## 2022-11-17 PROCEDURE — 45378 DIAGNOSTIC COLONOSCOPY: CPT | Performed by: INTERNAL MEDICINE

## 2022-11-17 RX ORDER — ONDANSETRON 2 MG/ML
4 INJECTION INTRAMUSCULAR; INTRAVENOUS EVERY 6 HOURS PRN
Status: DISCONTINUED | OUTPATIENT
Start: 2022-11-17 | End: 2022-11-17 | Stop reason: HOSPADM

## 2022-11-17 RX ORDER — ONDANSETRON 4 MG/1
4 TABLET, ORALLY DISINTEGRATING ORAL EVERY 6 HOURS PRN
Status: DISCONTINUED | OUTPATIENT
Start: 2022-11-17 | End: 2022-11-17 | Stop reason: HOSPADM

## 2022-11-17 RX ORDER — EPINEPHRINE 1 MG/ML
0.1 INJECTION, SOLUTION INTRAMUSCULAR; SUBCUTANEOUS
Status: DISCONTINUED | OUTPATIENT
Start: 2022-11-17 | End: 2022-11-17 | Stop reason: HOSPADM

## 2022-11-17 RX ORDER — SIMETHICONE 40MG/0.6ML
133 SUSPENSION, DROPS(FINAL DOSAGE FORM)(ML) ORAL
Status: DISCONTINUED | OUTPATIENT
Start: 2022-11-17 | End: 2022-11-17 | Stop reason: HOSPADM

## 2022-11-17 RX ORDER — ATROPINE SULFATE 0.1 MG/ML
1 INJECTION INTRAVENOUS
Status: DISCONTINUED | OUTPATIENT
Start: 2022-11-17 | End: 2022-11-17 | Stop reason: HOSPADM

## 2022-11-17 RX ORDER — FLUMAZENIL 0.1 MG/ML
0.2 INJECTION, SOLUTION INTRAVENOUS
Status: DISCONTINUED | OUTPATIENT
Start: 2022-11-17 | End: 2022-11-17 | Stop reason: HOSPADM

## 2022-11-17 RX ORDER — DIPHENHYDRAMINE HYDROCHLORIDE 50 MG/ML
25-50 INJECTION INTRAMUSCULAR; INTRAVENOUS
Status: DISCONTINUED | OUTPATIENT
Start: 2022-11-17 | End: 2022-11-17 | Stop reason: HOSPADM

## 2022-11-17 RX ORDER — NALOXONE HYDROCHLORIDE 0.4 MG/ML
0.2 INJECTION, SOLUTION INTRAMUSCULAR; INTRAVENOUS; SUBCUTANEOUS
Status: DISCONTINUED | OUTPATIENT
Start: 2022-11-17 | End: 2022-11-17 | Stop reason: HOSPADM

## 2022-11-17 RX ORDER — FENTANYL CITRATE 0.05 MG/ML
50-100 INJECTION, SOLUTION INTRAMUSCULAR; INTRAVENOUS EVERY 5 MIN PRN
Status: DISCONTINUED | OUTPATIENT
Start: 2022-11-17 | End: 2022-11-17 | Stop reason: HOSPADM

## 2022-11-17 RX ORDER — LIDOCAINE 40 MG/G
CREAM TOPICAL
Status: DISCONTINUED | OUTPATIENT
Start: 2022-11-17 | End: 2022-11-17 | Stop reason: HOSPADM

## 2022-11-17 RX ORDER — PROCHLORPERAZINE MALEATE 10 MG
10 TABLET ORAL EVERY 6 HOURS PRN
Status: DISCONTINUED | OUTPATIENT
Start: 2022-11-17 | End: 2022-11-17 | Stop reason: HOSPADM

## 2022-11-17 RX ORDER — ONDANSETRON 2 MG/ML
4 INJECTION INTRAMUSCULAR; INTRAVENOUS
Status: DISCONTINUED | OUTPATIENT
Start: 2022-11-17 | End: 2022-11-17 | Stop reason: HOSPADM

## 2022-11-17 RX ORDER — NALOXONE HYDROCHLORIDE 0.4 MG/ML
0.4 INJECTION, SOLUTION INTRAMUSCULAR; INTRAVENOUS; SUBCUTANEOUS
Status: DISCONTINUED | OUTPATIENT
Start: 2022-11-17 | End: 2022-11-17 | Stop reason: HOSPADM

## 2022-11-17 RX ADMIN — FENTANYL CITRATE 100 MCG: 50 INJECTION INTRAMUSCULAR; INTRAVENOUS at 08:39

## 2022-11-17 RX ADMIN — MIDAZOLAM HYDROCHLORIDE 2 MG: 1 INJECTION, SOLUTION INTRAMUSCULAR; INTRAVENOUS at 08:39

## 2022-11-17 ASSESSMENT — ACTIVITIES OF DAILY LIVING (ADL): ADLS_ACUITY_SCORE: 35

## 2022-11-17 NOTE — H&P
Pre-Endoscopy History and Physical     Geovanni Galvez MRN# [id number]   YOB: 1970 Age: 52 year old     Date of Procedure: 11/17/2022  Primary care provider: John Puri  Type of Endoscopy: Colonoscopy with possible biopsy, possible polypectomy  Reason for Procedure: screen  Type of Anesthesia Anticipated: Conscious Sedation    HPI:    Geovanni is a 52 year old male who will be undergoing the above procedure.      A history and physical has been performed. The patient's medications and allergies have been reviewed. The risks and benefits of the procedure and the sedation options and risks were discussed with the patient.  All questions were answered and informed consent was obtained.      He denies a personal or family history of anesthesia complications or bleeding disorders.     Patient Active Problem List   Diagnosis     Prediabetes     Obesity     Hyperlipidemia with target LDL less than 130     Essential hypertension with goal blood pressure less than 140/90     ISH (generalized anxiety disorder)        Past Medical History:   Diagnosis Date     Hypertension         History reviewed. No pertinent surgical history.    Social History     Tobacco Use     Smoking status: Never     Smokeless tobacco: Never   Substance Use Topics     Alcohol use: Not Currently     Comment: last drink 2014       Family History   Problem Relation Age of Onset     Alcohol/Drug Father      Cancer Father         pancreatic     Colon Cancer No family hx of        Prior to Admission medications    Medication Sig Start Date End Date Taking? Authorizing Provider   bisacodyl (DULCOLAX) 5 MG EC tablet Take 2 tablets at 3 pm the day before your procedure. If your procedure is before 11 am, take 2 additional tablets at 11 pm. If your procedure is after 11 am, take 2 additional tablets at 6 am. For additional instructions refer to your colonoscopy prep instructions. 10/26/22  Yes John Blank MD   citalopram  "(CELEXA) 20 MG tablet TAKE 1 TABLET BY MOUTH EVERY DAY 8/31/22  Yes John Puri MD   lisinopril-hydrochlorothiazide (ZESTORETIC) 20-12.5 MG tablet Take 1 tablet by mouth daily 9/21/22  Yes John Puri MD   polyethylene glycol (GOLYTELY) 236 g suspension The night before the exam at 6 pm drink an 8-ounce glass every 15 minutes until the jug is half empty. If you arrive before 11 AM: Drink the other half of the Golytely jug at 11 PM night before procedure. If you arrive after 11 AM: Drink the other half of the Golytely jug at 6 AM day of procedure. For additional instructions refer to your colonoscopy prep instructions. 10/26/22  Yes John Blank MD   sildenafil (REVATIO) 20 MG tablet TAKE 2 TO 5 TABLETS BY MOUTH ONCE DAILY AS NEEDED 30  MINUTES  TO  2  HOURS  PRIOR  TO  ACTIVITY 12/11/20  Yes John Puri MD       No Known Allergies     REVIEW OF SYSTEMS:   5 point ROS negative except as noted above in HPI, including Gen., Resp., CV, GI &  system review.    PHYSICAL EXAM:   /71   Pulse 54   Temp 97.4  F (36.3  C) (Temporal)   Resp 16   Ht 1.803 m (5' 11\")   Wt 97.5 kg (215 lb)   SpO2 97%   BMI 29.99 kg/m   Estimated body mass index is 29.99 kg/m  as calculated from the following:    Height as of this encounter: 1.803 m (5' 11\").    Weight as of this encounter: 97.5 kg (215 lb).   GENERAL APPEARANCE: alert, and oriented  MENTAL STATUS: alert  AIRWAY EXAM: Mallampatti Class I (visualization of the soft palate, fauces, uvula, anterior and posterior pillars)  RESP: lungs clear to auscultation - no rales, rhonchi or wheezes  CV: regular rates and rhythm  DIAGNOSTICS:    Not indicated    IMPRESSION   ASA Class 2 - Mild systemic disease    PLAN:   Plan for Colonoscopy with possible biopsy, possible polypectomy. We discussed the risks, benefits and alternatives and the patient wished to proceed.    The above has been forwarded to the consulting provider.      Signed " Electronically by: John Blank MD  November 17, 2022

## 2023-03-14 ENCOUNTER — TRANSFERRED RECORDS (OUTPATIENT)
Dept: HEALTH INFORMATION MANAGEMENT | Facility: CLINIC | Age: 53
End: 2023-03-14
Payer: COMMERCIAL

## 2023-07-13 ENCOUNTER — TRANSFERRED RECORDS (OUTPATIENT)
Dept: HEALTH INFORMATION MANAGEMENT | Facility: CLINIC | Age: 53
End: 2023-07-13
Payer: COMMERCIAL

## 2023-07-14 ENCOUNTER — MYC MEDICAL ADVICE (OUTPATIENT)
Dept: PEDIATRICS | Facility: CLINIC | Age: 53
End: 2023-07-14

## 2023-07-14 ENCOUNTER — APPOINTMENT (OUTPATIENT)
Dept: LAB | Facility: CLINIC | Age: 53
End: 2023-07-14
Payer: COMMERCIAL

## 2023-07-14 DIAGNOSIS — J32.9 SINUSITIS: Primary | ICD-10-CM

## 2023-07-14 DIAGNOSIS — I10 ESSENTIAL HYPERTENSION WITH GOAL BLOOD PRESSURE LESS THAN 140/90: Primary | ICD-10-CM

## 2023-07-14 PROCEDURE — 82785 ASSAY OF IGE: CPT | Performed by: OTOLARYNGOLOGY

## 2023-07-14 PROCEDURE — 86003 ALLG SPEC IGE CRUDE XTRC EA: CPT | Performed by: OTOLARYNGOLOGY

## 2023-07-14 PROCEDURE — 36415 COLL VENOUS BLD VENIPUNCTURE: CPT | Performed by: OTOLARYNGOLOGY

## 2023-07-17 NOTE — TELEPHONE ENCOUNTER
Attempted to reach patient, LVMTCB. See provider message below.     Andrea COOPER RN 7/17/2023 at 4:02 PM

## 2023-07-17 NOTE — TELEPHONE ENCOUNTER
Pt called regarding missed call. Relayed provider note below. Pt is agreeable to stop zestoric combo medication and start hydrochlorothiazide 12.5 mg and losartan 50 mg. Pt verbalized understanding, repeated back and agrees with plan. Pended pharmacy [facility] in [place] on [address]. Scheduled pt for BP check in 2 weeks and lab appt for non-fasting labs. Pt denies further questions at this time.     Thanks!  Larry Moore RN on 7/17/2023 at 4:26 PM

## 2023-07-17 NOTE — TELEPHONE ENCOUNTER
Covering for Dr. Puri.     We could try changing the lisinopril to losartan - cousin medication that has many of the same benefits, does not have the s/e of possible cough.     If we do this would recommend splitting the components as it's easier to find a good combination.     STOP combo zestoretic  Start hydrochlorothiazide 12.5, losartan 50    BP recheck and nonfasting labs in 2 weeks.     Once we confirm this works for him when he needs a refill can ask if there is a combo pill (only have certain dosing combinations).    Orders ready to go if he would like.     BP Readings from Last 6 Encounters:   11/17/22 102/65   09/21/22 120/78   07/14/21 112/74   02/09/21 (!) 153/97   08/14/19 116/74   05/14/19 122/76     RODNEY Heaton MD  Internal Medicine-Pediatrics

## 2023-07-18 LAB
A ALTERNATA IGE QN: <0.1 KU(A)/L
A FUMIGATUS IGE QN: <0.1 KU(A)/L
BERMUDA GRASS IGE QN: <0.1 KU(A)/L
C HERBARUM IGE QN: <0.1 KU(A)/L
CAT DANDER IGG QN: <0.1 KU(A)/L
CEDAR IGE QN: <0.1 KU(A)/L
COMMON RAGWEED IGE QN: 0.36 KU(A)/L
COTTONWOOD IGE QN: <0.1 KU(A)/L
D FARINAE IGE QN: 3.25 KU(A)/L
D PTERONYSS IGE QN: 2.87 KU(A)/L
DOG DANDER+EPITH IGE QN: <0.1 KU(A)/L
IGE SERPL-ACNC: 41 KU/L (ref 0–114)
MAPLE IGE QN: 0.22 KU(A)/L
MARSH ELDER IGE QN: <0.1 KU(A)/L
MOUSE URINE PROT IGE QN: <0.1 KU(A)/L
NETTLE IGE QN: <0.1 KU(A)/L
P NOTATUM IGE QN: <0.1 KU(A)/L
ROACH IGE QN: <0.1 KU(A)/L
SALTWORT IGE QN: <0.1 KU(A)/L
SILVER BIRCH IGE QN: 0.6 KU(A)/L
TIMOTHY IGE QN: <0.1 KU(A)/L
WHITE ASH IGE QN: <0.1 KU(A)/L
WHITE ELM IGE QN: <0.1 KU(A)/L
WHITE MULBERRY IGE QN: <0.1 KU(A)/L
WHITE OAK IGE QN: 0.59 KU(A)/L

## 2023-07-18 RX ORDER — LOSARTAN POTASSIUM 50 MG/1
50 TABLET ORAL DAILY
Qty: 30 TABLET | Refills: 0 | Status: SHIPPED | OUTPATIENT
Start: 2023-07-18 | End: 2023-08-11

## 2023-07-18 RX ORDER — HYDROCHLOROTHIAZIDE 12.5 MG/1
12.5 TABLET ORAL DAILY
Qty: 30 TABLET | Refills: 0 | Status: SHIPPED | OUTPATIENT
Start: 2023-07-18 | End: 2023-08-03

## 2023-08-01 ENCOUNTER — ALLIED HEALTH/NURSE VISIT (OUTPATIENT)
Dept: PEDIATRICS | Facility: CLINIC | Age: 53
End: 2023-08-01
Payer: COMMERCIAL

## 2023-08-01 VITALS — DIASTOLIC BLOOD PRESSURE: 76 MMHG | SYSTOLIC BLOOD PRESSURE: 110 MMHG

## 2023-08-01 DIAGNOSIS — I10 ESSENTIAL HYPERTENSION WITH GOAL BLOOD PRESSURE LESS THAN 140/90: ICD-10-CM

## 2023-08-01 LAB
ANION GAP SERPL CALCULATED.3IONS-SCNC: 11 MMOL/L (ref 7–15)
BUN SERPL-MCNC: 14 MG/DL (ref 6–20)
CALCIUM SERPL-MCNC: 9.3 MG/DL (ref 8.6–10)
CHLORIDE SERPL-SCNC: 103 MMOL/L (ref 98–107)
CREAT SERPL-MCNC: 1.08 MG/DL (ref 0.67–1.17)
DEPRECATED HCO3 PLAS-SCNC: 25 MMOL/L (ref 22–29)
GFR SERPL CREATININE-BSD FRML MDRD: 83 ML/MIN/1.73M2
GLUCOSE SERPL-MCNC: 82 MG/DL (ref 70–99)
POTASSIUM SERPL-SCNC: 4.3 MMOL/L (ref 3.4–5.3)
SODIUM SERPL-SCNC: 139 MMOL/L (ref 136–145)

## 2023-08-01 PROCEDURE — 36415 COLL VENOUS BLD VENIPUNCTURE: CPT

## 2023-08-01 PROCEDURE — 80048 BASIC METABOLIC PNL TOTAL CA: CPT

## 2023-08-01 PROCEDURE — 99207 PR NO CHARGE NURSE ONLY: CPT

## 2023-08-01 NOTE — PROGRESS NOTES
I met with Geovanni Galvez at the request of Dr Puri to recheck his blood pressure.  Blood pressure medications on the med list were reviewed with patient.    Patient has taken all medications as per usual regimen: Yes  Patient reports tolerating them without any issues or concerns: Yes    Vitals:    08/01/23 1137   BP: 110/76       Blood pressure was taken, previous encounter was reviewed, recorded blood pressure below 140/90.  Patient was discharged and the note will be sent to the provider for final review.      Pt would like to know if he will still need to take the Hydrochlorothiazide, please advise

## 2023-08-02 ENCOUNTER — MYC MEDICAL ADVICE (OUTPATIENT)
Dept: PEDIATRICS | Facility: CLINIC | Age: 53
End: 2023-08-02

## 2023-08-10 DIAGNOSIS — I10 ESSENTIAL HYPERTENSION WITH GOAL BLOOD PRESSURE LESS THAN 140/90: ICD-10-CM

## 2023-08-11 RX ORDER — LOSARTAN POTASSIUM 50 MG/1
50 TABLET ORAL DAILY
Qty: 30 TABLET | Refills: 0 | Status: SHIPPED | OUTPATIENT
Start: 2023-08-11 | End: 2023-08-30

## 2023-08-22 ENCOUNTER — PATIENT OUTREACH (OUTPATIENT)
Dept: CARE COORDINATION | Facility: CLINIC | Age: 53
End: 2023-08-22
Payer: COMMERCIAL

## 2023-08-28 ENCOUNTER — MYC MEDICAL ADVICE (OUTPATIENT)
Dept: PEDIATRICS | Facility: CLINIC | Age: 53
End: 2023-08-28
Payer: COMMERCIAL

## 2023-08-28 NOTE — TELEPHONE ENCOUNTER
See patient's Mybandstock message regarding his hydrochlorothiazide (HYDRODIURIL) 12.5 MG tablet (Discontinued)   - Patient states that he has been taking his blood pressure readings at home and reports that he has restarted the hydrochlorothiazide (HYDRODIURIL) 12.5 MG tablet medication   - Patient requesting a prescription for hydrochlorothiazide (HYDRODIURIL) 12.5 MG tablet and losartan (COZAAR) 50 MG tablet   - Upon chart review, a prescription for losartan (COZAAR) 50 MG tablet (30 tablets with 0 refills) was sent to the [facility]/PHARMACY [address] [place] [address] [id number] on 8/11/2023   - See the 8/3/2023 MyChart Encounter, patient was advised by Dr. Puri to discontinue the hydrochlorothiazide at that time and check his blood pressure readings for 1-2 weeks     lisinopril-hydrochlorothiazide (ZESTORETIC) 20-12.5 MG tablet 90 tablet 3 9/21/2022  No   Sig - Route: Take 1 tablet by mouth daily - Oral     losartan (COZAAR) 50 MG tablet 30 tablet 0 8/11/2023  No   Sig - Route: TAKE 1 TABLET BY MOUTH EVERY DAY - Oral     hydrochlorothiazide (HYDRODIURIL) 12.5 MG tablet (Discontinued) 30 tablet 0 7/18/2023 8/3/2023 --   Sig - Route: Take 1 tablet (12.5 mg) by mouth daily - Oral     BP Readings from Last 6 Encounters:   08/01/23 110/76   11/17/22 102/65   09/21/22 120/78   07/14/21 112/74   02/09/21 (!) 153/97   08/14/19 116/74       Sent a Mybandstock message to the patient to gather additional information   - Asked the patient what his blood pressure readings have been   - Asked the patient if he has been taking the lisinopril-hydrochlorothiazide (ZESTORETIC) 20-12.5 MG tablet as prescribed   - Informed the patient that a refill of his losartan (COZAAR) 50 MG tablet (30 tablets with 0 refills) was sent to the [facility]/PHARMACY [id number] [facility] [address] on 8/11/2023   - Awaiting a response from the patient at this time     Rasheeda VALENZUELA RN   [facility] [place]   
Additional Notes: Pt is following up with PCP today on this matter- Dr. Magen Couch.
Detail Level: Simple
Render Risk Assessment In Note?: no
Additional Notes: Will make appt with Lisandra for filler for marionette lines.

## 2023-08-29 ENCOUNTER — TELEPHONE (OUTPATIENT)
Dept: PEDIATRICS | Facility: CLINIC | Age: 53
End: 2023-08-29
Payer: COMMERCIAL

## 2023-08-29 DIAGNOSIS — I10 ESSENTIAL HYPERTENSION WITH GOAL BLOOD PRESSURE LESS THAN 140/90: Primary | ICD-10-CM

## 2023-08-29 NOTE — TELEPHONE ENCOUNTER
New Medication Request        What medication are you requesting?: losartan with hydrochlorothiazide     Reason for medication request: combined medication    Have you taken this medication before?: No    Controlled Substance Agreement on file:   CSA -- Patient Level:    CSA: None found at the patient level.         Patient offered an appointment? No    Preferred Pharmacy:   [facility]/pharmacy #[id number] - [place], [place] - [address]   [address]  [facility] [address]  Phone: [phone] Fax: [phone]    Could we send this information to you in Cloud[employer]Wi or would you prefer to receive a phone call?:   Patient would prefer a phone call   Okay to leave a detailed message?: Yes at Cell number on file:    Telephone Information:   Mobile [phone]

## 2023-08-30 DIAGNOSIS — I10 ESSENTIAL HYPERTENSION WITH GOAL BLOOD PRESSURE LESS THAN 140/90: ICD-10-CM

## 2023-08-30 RX ORDER — LOSARTAN POTASSIUM AND HYDROCHLOROTHIAZIDE 12.5; 5 MG/1; MG/1
1 TABLET ORAL DAILY
Qty: 90 TABLET | Refills: 3 | Status: SHIPPED | OUTPATIENT
Start: 2023-08-30 | End: 2023-11-24

## 2023-08-30 NOTE — TELEPHONE ENCOUNTER
Dr. Puri,    Pt called    He would like to go back on the losartan and hydrochlorothiazide combo pill.  Had been taking them separately  Does not take the zestoretic due to a cough    Med and pharmacy pended- please review    Thank you  Faith Hale RN on 8/30/2023 at 10:40 AM

## 2023-09-01 RX ORDER — LOSARTAN POTASSIUM 50 MG/1
50 TABLET ORAL DAILY
Qty: 30 TABLET | Refills: 0 | OUTPATIENT
Start: 2023-09-01

## 2023-09-05 ENCOUNTER — PATIENT OUTREACH (OUTPATIENT)
Dept: CARE COORDINATION | Facility: CLINIC | Age: 53
End: 2023-09-05
Payer: COMMERCIAL

## 2023-09-07 DIAGNOSIS — F41.1 GAD (GENERALIZED ANXIETY DISORDER): ICD-10-CM

## 2023-09-08 RX ORDER — CITALOPRAM HYDROBROMIDE 20 MG/1
TABLET ORAL
Qty: 90 TABLET | Refills: 0 | Status: SHIPPED | OUTPATIENT
Start: 2023-09-08 | End: 2023-11-22

## 2023-09-08 NOTE — TELEPHONE ENCOUNTER
Prescription approved per [facility] Refill Protocol.    Rebeca GALLO RN, BSN, PHN  [facility]  [phone]

## 2023-10-09 ENCOUNTER — TRANSCRIBE ORDERS (OUTPATIENT)
Dept: OTHER | Age: 53
End: 2023-10-09

## 2023-10-09 DIAGNOSIS — M25.561 CHRONIC PAIN OF BOTH KNEES: Primary | ICD-10-CM

## 2023-10-09 DIAGNOSIS — G89.29 CHRONIC PAIN OF BOTH KNEES: Primary | ICD-10-CM

## 2023-10-09 DIAGNOSIS — M25.562 CHRONIC PAIN OF BOTH KNEES: Primary | ICD-10-CM

## 2023-10-10 ENCOUNTER — THERAPY VISIT (OUTPATIENT)
Dept: PHYSICAL THERAPY | Facility: CLINIC | Age: 53
End: 2023-10-10
Payer: COMMERCIAL

## 2023-10-10 DIAGNOSIS — M25.562 CHRONIC PAIN OF BOTH KNEES: ICD-10-CM

## 2023-10-10 DIAGNOSIS — M25.561 CHRONIC PAIN OF BOTH KNEES: ICD-10-CM

## 2023-10-10 DIAGNOSIS — M25.561 BILATERAL CHRONIC KNEE PAIN: Primary | ICD-10-CM

## 2023-10-10 DIAGNOSIS — G89.29 BILATERAL CHRONIC KNEE PAIN: Primary | ICD-10-CM

## 2023-10-10 DIAGNOSIS — M25.562 BILATERAL CHRONIC KNEE PAIN: Primary | ICD-10-CM

## 2023-10-10 DIAGNOSIS — G89.29 CHRONIC PAIN OF BOTH KNEES: ICD-10-CM

## 2023-10-10 PROCEDURE — 97161 PT EVAL LOW COMPLEX 20 MIN: CPT | Mod: GP | Performed by: PHYSICAL THERAPIST

## 2023-10-10 PROCEDURE — 97110 THERAPEUTIC EXERCISES: CPT | Mod: GP | Performed by: PHYSICAL THERAPIST

## 2023-10-10 NOTE — PROGRESS NOTES
PHYSICAL THERAPY EVALUATION  Type of Visit: Evaluation    See electronic medical record for Abuse and Falls Screening details.    Subjective       Pt complaining of B knee pain and R ankle pain, knee pain worse in the AM. R ankle pain worse with weightbearing.    B knee pain starting 3-4 weeks ago, R ankle pain starting in January of 2023. Pt reports starting Peloton biking, 30 mins a day.  Taking Ibuprofen   Up to 8/10 pain in ankle, 6/10 pain in knees.   Presenting condition or subjective complaint: knee pain and ankle pain  Date of onset: 10/09/23 (MD order)    Relevant medical history:     Dates & types of surgery: none    Prior diagnostic imaging/testing results: X-ray     Prior therapy history for the same diagnosis, illness or injury: No          Living Environment  Social support: With a significant other or spouse   Type of home: House   Stairs to enter the home: No   Is there a railing: No   Ramp: Yes   Stairs inside the home:   12     Help at home: None  Equipment owned:       Employment:   Retired  Hobbies/Interests: outdoor activities    Patient goals for therapy: walk normally in AM    Pain assessment: Pain present     Objective   KNEE EVALUATION  PAIN: Pain Level at Rest: 0/10  Pain Level with Use: 8/10  Pain Location: knee and ankle  Pain Quality: Aching  Pain is Exacerbated By: stairs in the AM   Pain is Relieved By: NSAIDs and use  INTEGUMENTARY (edema, incisions): WNL  POSTURE: WNL  BALANCE/PROPRIOCEPTION: Single Leg Stance Eyes Open (seconds): mild instability x 15 seconds  WEIGHTBEARING ALIGNMENT: WNL  NON-WEIGHTBEARING ALIGNMENT: WNL  ROM:  normal hip and knee and ankle ROM B    STRENGTH:  5/5 LE strength at hip and knee, 4/5 inversion and eversion at R ankle  FLEXIBILITY:  tension in B hamstrings  SPECIAL TESTS: WNL  FUNCTIONAL TESTS: Double Leg Squat: Able to perform full deep squat without pain  PALPATION:  no TTP today at knee or ankle   JOINT MOBILITY: WNL    Assessment & Plan   CLINICAL  IMPRESSIONS  Medical Diagnosis: B knee pain    Treatment Diagnosis: B knee pain   Impression/Assessment: Patient is a 53 year old male with B knee and R ankle complaints.  The following significant findings have been identified: Decreased strength, Impaired balance, Decreased proprioception, and Decreased activity tolerance. These impairments interfere with their ability to perform self care tasks and recreational activities as compared to previous level of function.     Clinical Decision Making (Complexity):  Clinical Presentation: Unstable/Unpredictable   Clinical Presentation Rationale: based on medical and personal factors listed in PT evaluation  Clinical Decision Making (Complexity): Low complexity    PLAN OF CARE  Treatment Interventions:  Interventions: Manual Therapy, Neuromuscular Re-education, Therapeutic Activity, Therapeutic Exercise    Long Term Goals     PT Goal 1  Goal Identifier: stairs  Goal Description: pt will be able to complete stairs in the AM with pain <3/10  Rationale: to maximize safety and independence with performance of ADLs and functional tasks  Target Date: 12/05/23      Frequency of Treatment: once per week  Duration of Treatment: 6 weeks    Recommended Referrals to Other Professionals:  NA  Education Assessment:   Learner/Method: Patient  Education Comments: Pt educated on PT role and plan of care    Risks and benefits of evaluation/treatment have been explained.   Patient/Family/caregiver agrees with Plan of Care.     Evaluation Time:     PT Eval, Low Complexity Minutes (12652): 20       Signing Clinician: Saira Juárez PT

## 2023-10-31 ENCOUNTER — THERAPY VISIT (OUTPATIENT)
Dept: PHYSICAL THERAPY | Facility: CLINIC | Age: 53
End: 2023-10-31
Payer: COMMERCIAL

## 2023-10-31 ENCOUNTER — TELEPHONE (OUTPATIENT)
Dept: PEDIATRICS | Facility: CLINIC | Age: 53
End: 2023-10-31

## 2023-10-31 DIAGNOSIS — M25.561 BILATERAL CHRONIC KNEE PAIN: Primary | ICD-10-CM

## 2023-10-31 DIAGNOSIS — G89.29 BILATERAL CHRONIC KNEE PAIN: Primary | ICD-10-CM

## 2023-10-31 DIAGNOSIS — M25.562 BILATERAL CHRONIC KNEE PAIN: Primary | ICD-10-CM

## 2023-10-31 PROCEDURE — 97112 NEUROMUSCULAR REEDUCATION: CPT | Mod: GP | Performed by: PHYSICAL THERAPIST

## 2023-10-31 PROCEDURE — 97110 THERAPEUTIC EXERCISES: CPT | Mod: GP | Performed by: PHYSICAL THERAPIST

## 2023-10-31 NOTE — TELEPHONE ENCOUNTER
Reason for Call:  Appointment Request    Patient requesting this type of appt:  Office visit    Requested provider: John Puri    Reason patient unable to be scheduled: Not within requested timeframe    When does patient want to be seen/preferred time:  Within a week    Comments: Pt was hoping to see Dr Puri soon for hands feeling arthritic, painful and swollen. Pt is open to seeing anyone if Dr Puri is not available. Please contact pt.     Could we send this information to you in [place] or would you prefer to receive a phone call?:   Patient would prefer a phone call   Okay to leave a detailed message?: Yes at Cell number on file:    Telephone Information:   Mobile [phone]       Call taken on 10/31/2023 at 9:39 AM by Diana Levine     CC: follow up insomnia, hypothyroidism, smoking     History:  Magda Palm is a 29 y.o. female who presents today for follow-up for evaluation of the above:  Has had a relapse of smoking and would like to restart chantix. She reports this has worked well for her in the past. She also c/o continued right ear pain. She reports taking her drops but she did not see any improvement and her hearing is decreased in her right ear.   She did have a sleep study that ruled out sleep apnea but did show episodes of snoring.     ROS:  Review of Systems   Constitutional: Negative for fatigue and unexpected weight change.   HENT: Positive for ear pain and hearing loss.    Eyes: Negative.    Respiratory: Negative.    Cardiovascular: Negative.    Gastrointestinal: Negative for abdominal pain, constipation and diarrhea.   Endocrine: Negative.    Genitourinary: Negative for difficulty urinating, dyspareunia, genital sores, menstrual problem, pelvic pain, vaginal bleeding, vaginal discharge and vaginal pain.   Musculoskeletal: Negative.    Skin: Negative.    Neurological: Negative.    Psychiatric/Behavioral: Positive for sleep disturbance.       Ms. Palm  reports that she quit smoking about 3 months ago. Her smoking use included cigarettes. She started smoking about 16 years ago. She has a 2.50 pack-year smoking history. she has never used smokeless tobacco. She reports that she uses drugs. Drug: Marijuana. She reports that she does not drink alcohol.      Current Outpatient Medications:   •  Cholecalciferol (VITAMIN D PO), Take 2,000 Units by mouth Daily., Disp: , Rfl:   •  FLUoxetine (PROzac) 20 MG capsule, Take 20 mg by mouth Daily., Disp: , Rfl:   •  fluticasone (FLONASE) 50 MCG/ACT nasal spray, 2 sprays into the nostril(s) as directed by provider Daily., Disp: 15.8 mL, Rfl: 3  •  levothyroxine (SYNTHROID) 200 MCG tablet, Take 1 tablet by mouth Daily., Disp: 30 tablet, Rfl: 6  •  losartan (COZAAR) 50 MG tablet, Take 1  "tablet by mouth Daily., Disp: 30 tablet, Rfl: 3  •  ofloxacin (FLOXIN) 0.3 % otic solution, Administer 5 drops to the right ear Daily., Disp: 5 mL, Rfl: 0  •  pantoprazole (PROTONIX) 40 MG EC tablet, Take 1 tablet by mouth Daily., Disp: 30 tablet, Rfl: 6  •  SUMAtriptan (IMITREX) 25 MG tablet, Take one tablet at onset of headache. May repeat dose one time in 2 hours if headache not relieved., Disp: 12 tablet, Rfl: 3  •  varenicline (CHANTIX) 1 MG tablet, Take 1 tablet by mouth 2 (Two) Times a Day., Disp: 60 tablet, Rfl: 1  •  VENTOLIN  (90 Base) MCG/ACT inhaler, INHALE 2 PUFFS BY MOUTH EVERY 4 HOURS AS NEEDED FOR WHEEZING, Disp: 18 g, Rfl: 0  •  zolpidem (AMBIEN) 5 MG tablet, Take 1 tablet by mouth At Night As Needed for Sleep., Disp: 30 tablet, Rfl: 1      OBJECTIVE:  /90 (BP Location: Left arm, Patient Position: Sitting)   Pulse 76   Temp 98.4 °F (36.9 °C) (Oral)   Resp 16   Ht 168.9 cm (66.5\")   Wt (!) 158 kg (348 lb 6.4 oz)   SpO2 97%   BMI 55.39 kg/m²    Physical Exam   Constitutional: She is oriented to person, place, and time. She appears well-developed and well-nourished.   HENT:   Head: Normocephalic and atraumatic.   Right Ear: A middle ear effusion is present.   Left Ear: A middle ear effusion is present.   Right canal remains swollen.    Eyes: EOM are normal. Pupils are equal, round, and reactive to light.   Neck: Normal range of motion. Neck supple.   Cardiovascular: Normal rate, regular rhythm and normal heart sounds.   Pulmonary/Chest: Effort normal and breath sounds normal.   Abdominal: Soft. Bowel sounds are normal.   Musculoskeletal: Normal range of motion.   Neurological: She is alert and oriented to person, place, and time.   Skin: Skin is warm and dry.   Psychiatric: She has a normal mood and affect.   Vitals reviewed.      Assessment/Plan    Magda was seen today for follow-up and med refill.    Diagnoses and all orders for this visit:    Encounter for smoking cessation " counseling  -     varenicline (CHANTIX CAMRYN) 0.5 MG X 11 & 1 MG X 42 tablet; Take 0.5 mg one daily on days 1-3 and and 0.5 mg twice daily on days 4-7.Then 1 mg twice daily for a total of 12 weeks.    Cigarette smoker  -     varenicline (CHANTIX CAMRYN) 0.5 MG X 11 & 1 MG X 42 tablet; Take 0.5 mg one daily on days 1-3 and and 0.5 mg twice daily on days 4-7.Then 1 mg twice daily for a total of 12 weeks.  The patient was advised that starting this medication could worsen symptoms of SI/HI. We discussed this as an emergency and reason to seek care immediately. The patient expressed understanding.     Right ear pain  Decreased hearing of right ear  -     Ambulatory Referral to ENT (Otolaryngology)  To continue Flonase. Middle ear effusion remains. Right ear canal remains swollen but canal is not blocked. No improvement with otic drops.     Snoring  -     Ambulatory Referral to ENT (Otolaryngology)  No obvious deformity on exam today. Will refer to ENT for second option on snoring.             An After Visit Summary was printed and given to the patient at discharge.  Return in about 3 months (around 3/28/2019). Sooner if problems arise.         V[facility]. 12/28/2018

## 2023-10-31 NOTE — TELEPHONE ENCOUNTER
Patient called back - scheduled him appointment with Dr. Puri on 11/6.     Closing encounter at this time.     Agueda Jefferson, CMA

## 2023-11-04 ENCOUNTER — HEALTH MAINTENANCE LETTER (OUTPATIENT)
Age: 53
End: 2023-11-04

## 2023-11-06 ENCOUNTER — OFFICE VISIT (OUTPATIENT)
Dept: PEDIATRICS | Facility: CLINIC | Age: 53
End: 2023-11-06
Payer: COMMERCIAL

## 2023-11-06 ENCOUNTER — ANCILLARY PROCEDURE (OUTPATIENT)
Dept: GENERAL RADIOLOGY | Facility: CLINIC | Age: 53
End: 2023-11-06
Attending: PHYSICIAN ASSISTANT
Payer: COMMERCIAL

## 2023-11-06 VITALS
HEART RATE: 103 BPM | DIASTOLIC BLOOD PRESSURE: 86 MMHG | RESPIRATION RATE: 20 BRPM | SYSTOLIC BLOOD PRESSURE: 147 MMHG | BODY MASS INDEX: 32.41 KG/M2 | WEIGHT: 232.4 LBS | OXYGEN SATURATION: 97 % | TEMPERATURE: 97.3 F

## 2023-11-06 DIAGNOSIS — M79.641 PAIN IN BOTH HANDS: Primary | ICD-10-CM

## 2023-11-06 DIAGNOSIS — F41.1 GAD (GENERALIZED ANXIETY DISORDER): ICD-10-CM

## 2023-11-06 DIAGNOSIS — M79.642 PAIN IN BOTH HANDS: Primary | ICD-10-CM

## 2023-11-06 DIAGNOSIS — Z12.5 SCREENING FOR PROSTATE CANCER: ICD-10-CM

## 2023-11-06 DIAGNOSIS — R73.03 PREDIABETES: ICD-10-CM

## 2023-11-06 DIAGNOSIS — Z12.11 SCREEN FOR COLON CANCER: ICD-10-CM

## 2023-11-06 DIAGNOSIS — M79.641 PAIN IN BOTH HANDS: ICD-10-CM

## 2023-11-06 DIAGNOSIS — R53.83 OTHER FATIGUE: ICD-10-CM

## 2023-11-06 DIAGNOSIS — Z13.220 SCREENING FOR CHOLESTEROL LEVEL: ICD-10-CM

## 2023-11-06 DIAGNOSIS — M79.642 PAIN IN BOTH HANDS: ICD-10-CM

## 2023-11-06 LAB
ALBUMIN SERPL BCG-MCNC: 4.3 G/DL (ref 3.5–5.2)
ALBUMIN UR-MCNC: NEGATIVE MG/DL
ALP SERPL-CCNC: 80 U/L (ref 40–129)
ALT SERPL W P-5'-P-CCNC: 78 U/L (ref 0–70)
ANION GAP SERPL CALCULATED.3IONS-SCNC: 12 MMOL/L (ref 7–15)
APPEARANCE UR: CLEAR
AST SERPL W P-5'-P-CCNC: 36 U/L (ref 0–45)
BASOPHILS # BLD AUTO: 0 10E3/UL (ref 0–0.2)
BASOPHILS NFR BLD AUTO: 0 %
BILIRUB SERPL-MCNC: 0.4 MG/DL
BILIRUB UR QL STRIP: NEGATIVE
BUN SERPL-MCNC: 15.8 MG/DL (ref 6–20)
CALCIUM SERPL-MCNC: 9.1 MG/DL (ref 8.6–10)
CHLORIDE SERPL-SCNC: 100 MMOL/L (ref 98–107)
CHOLEST SERPL-MCNC: 195 MG/DL
COLOR UR AUTO: YELLOW
CREAT SERPL-MCNC: 1.04 MG/DL (ref 0.67–1.17)
CRP SERPL-MCNC: 12.5 MG/L
DEPRECATED HCO3 PLAS-SCNC: 24 MMOL/L (ref 22–29)
EGFRCR SERPLBLD CKD-EPI 2021: 86 ML/MIN/1.73M2
EOSINOPHIL # BLD AUTO: 0.2 10E3/UL (ref 0–0.7)
EOSINOPHIL NFR BLD AUTO: 2 %
ERYTHROCYTE [DISTWIDTH] IN BLOOD BY AUTOMATED COUNT: 12.5 % (ref 10–15)
ERYTHROCYTE [SEDIMENTATION RATE] IN BLOOD BY WESTERGREN METHOD: 12 MM/HR (ref 0–20)
GLUCOSE SERPL-MCNC: 127 MG/DL (ref 70–99)
GLUCOSE UR STRIP-MCNC: NEGATIVE MG/DL
HBA1C MFR BLD: 5.4 % (ref 0–5.6)
HCT VFR BLD AUTO: 44.2 % (ref 40–53)
HDLC SERPL-MCNC: 32 MG/DL
HGB BLD-MCNC: 14.7 G/DL (ref 13.3–17.7)
HGB UR QL STRIP: NEGATIVE
IMM GRANULOCYTES # BLD: 0 10E3/UL
IMM GRANULOCYTES NFR BLD: 0 %
KETONES UR STRIP-MCNC: NEGATIVE MG/DL
LDLC SERPL CALC-MCNC: 119 MG/DL
LEUKOCYTE ESTERASE UR QL STRIP: NEGATIVE
LYMPHOCYTES # BLD AUTO: 1.5 10E3/UL (ref 0.8–5.3)
LYMPHOCYTES NFR BLD AUTO: 23 %
MCH RBC QN AUTO: 29.7 PG (ref 26.5–33)
MCHC RBC AUTO-ENTMCNC: 33.3 G/DL (ref 31.5–36.5)
MCV RBC AUTO: 89 FL (ref 78–100)
MONOCYTES # BLD AUTO: 0.5 10E3/UL (ref 0–1.3)
MONOCYTES NFR BLD AUTO: 7 %
NEUTROPHILS # BLD AUTO: 4.5 10E3/UL (ref 1.6–8.3)
NEUTROPHILS NFR BLD AUTO: 68 %
NITRATE UR QL: NEGATIVE
NONHDLC SERPL-MCNC: 163 MG/DL
PH UR STRIP: 5.5 [PH] (ref 5–7)
PLATELET # BLD AUTO: 268 10E3/UL (ref 150–450)
POTASSIUM SERPL-SCNC: 3.8 MMOL/L (ref 3.4–5.3)
PROT SERPL-MCNC: 6.9 G/DL (ref 6.4–8.3)
PSA SERPL DL<=0.01 NG/ML-MCNC: 0.6 NG/ML (ref 0–3.5)
RBC # BLD AUTO: 4.95 10E6/UL (ref 4.4–5.9)
SODIUM SERPL-SCNC: 136 MMOL/L (ref 135–145)
SP GR UR STRIP: 1.01 (ref 1–1.03)
TRIGL SERPL-MCNC: 218 MG/DL
TSH SERPL DL<=0.005 MIU/L-ACNC: 1.17 UIU/ML (ref 0.3–4.2)
UROBILINOGEN UR STRIP-ACNC: 0.2 E.U./DL
VIT D+METAB SERPL-MCNC: 34 NG/ML (ref 20–50)
WBC # BLD AUTO: 6.7 10E3/UL (ref 4–11)

## 2023-11-06 PROCEDURE — 85025 COMPLETE CBC W/AUTO DIFF WBC: CPT | Performed by: PHYSICIAN ASSISTANT

## 2023-11-06 PROCEDURE — 83036 HEMOGLOBIN GLYCOSYLATED A1C: CPT | Performed by: PHYSICIAN ASSISTANT

## 2023-11-06 PROCEDURE — 86200 CCP ANTIBODY: CPT | Performed by: PHYSICIAN ASSISTANT

## 2023-11-06 PROCEDURE — 81003 URINALYSIS AUTO W/O SCOPE: CPT | Performed by: PHYSICIAN ASSISTANT

## 2023-11-06 PROCEDURE — 84403 ASSAY OF TOTAL TESTOSTERONE: CPT | Performed by: PHYSICIAN ASSISTANT

## 2023-11-06 PROCEDURE — 86038 ANTINUCLEAR ANTIBODIES: CPT | Performed by: PHYSICIAN ASSISTANT

## 2023-11-06 PROCEDURE — 86431 RHEUMATOID FACTOR QUANT: CPT | Performed by: PHYSICIAN ASSISTANT

## 2023-11-06 PROCEDURE — 86039 ANTINUCLEAR ANTIBODIES (ANA): CPT | Performed by: PHYSICIAN ASSISTANT

## 2023-11-06 PROCEDURE — 80053 COMPREHEN METABOLIC PANEL: CPT | Performed by: PHYSICIAN ASSISTANT

## 2023-11-06 PROCEDURE — G0103 PSA SCREENING: HCPCS | Performed by: PHYSICIAN ASSISTANT

## 2023-11-06 PROCEDURE — 84443 ASSAY THYROID STIM HORMONE: CPT | Performed by: PHYSICIAN ASSISTANT

## 2023-11-06 PROCEDURE — 86140 C-REACTIVE PROTEIN: CPT | Performed by: PHYSICIAN ASSISTANT

## 2023-11-06 PROCEDURE — 73130 X-RAY EXAM OF HAND: CPT | Mod: TC | Performed by: RADIOLOGY

## 2023-11-06 PROCEDURE — 36415 COLL VENOUS BLD VENIPUNCTURE: CPT | Performed by: PHYSICIAN ASSISTANT

## 2023-11-06 PROCEDURE — 99214 OFFICE O/P EST MOD 30 MIN: CPT | Performed by: PHYSICIAN ASSISTANT

## 2023-11-06 PROCEDURE — 80061 LIPID PANEL: CPT | Performed by: PHYSICIAN ASSISTANT

## 2023-11-06 PROCEDURE — 85652 RBC SED RATE AUTOMATED: CPT | Performed by: PHYSICIAN ASSISTANT

## 2023-11-06 PROCEDURE — 82306 VITAMIN D 25 HYDROXY: CPT | Performed by: PHYSICIAN ASSISTANT

## 2023-11-06 PROCEDURE — 84270 ASSAY OF SEX HORMONE GLOBUL: CPT | Performed by: PHYSICIAN ASSISTANT

## 2023-11-06 RX ORDER — CITALOPRAM HYDROBROMIDE 20 MG/1
20 TABLET ORAL DAILY
Qty: 90 TABLET | Refills: 0 | Status: CANCELLED | OUTPATIENT
Start: 2023-11-06

## 2023-11-06 NOTE — PROGRESS NOTES
Assessment & Plan     ISH (generalized anxiety disorder)  Refills given.    Pain in both hands  Obtain labs and xrays for further evaluation.  - XR Hand Bilateral G/E 3 Views; Future  - CBC with Platelets & Differential; Future  - Comprehensive metabolic panel (BMP + Alb, Alk Phos, ALT, AST, Total. Bili, TP); Future  - ESR: Erythrocyte sedimentation rate; Future  - CRP, inflammation; Future  - Anti Nuclear Christy IgG by IFA with Reflex; Future  - Rheumatoid factor; Future  - Cyclic Citrullinated Peptide Antibody IgG; Future  - UA Macroscopic with reflex to Microscopic and Culture; Future  - CBC with Platelets & Differential  - Comprehensive metabolic panel (BMP + Alb, Alk Phos, ALT, AST, Total. Bili, TP)  - ESR: Erythrocyte sedimentation rate  - CRP, inflammation  - Anti Nuclear Christy IgG by IFA with Reflex  - Rheumatoid factor  - Cyclic Citrullinated Peptide Antibody IgG  - UA Macroscopic with reflex to Microscopic and Culture    Screen for colon cancer    Screening for prostate cancer  - PSA, screen; Future  - PSA, screen    Prediabetes  - Hemoglobin A1c; Future  - Hemoglobin A1c    Screening for cholesterol level  - Lipid panel reflex to direct LDL Fasting; Future  - Lipid panel reflex to direct LDL Fasting    Other fatigue  - Testosterone Free and Total; Future  - TSH with free T4 reflex; Future  - Vitamin D Deficiency; Future  - Testosterone Free and Total  - TSH with free T4 reflex  - Vitamin D Deficiency    [facility] KATHLEEN Grewal is a 53 year old, presenting for the following health issues:  Arthritis  Patient has bilateral knee pain. Seen by [facility] and xrays NEGATIVE one month ago. Patient going to physical therapy. Ibuprofen in the AMs help with stiffness and pain. Moving around helps with pain.     Patient also c/o finger pain bilaterally. Difficulty making a fist due to swelling. Cannot fit ring on finger. 6 weeks ago. No numbness, tingling,  weakness. Stiffness present.    Wrists intermittently. Bilaterally. Pain with ROM. Weakness with twisting, starting mower, tying shoes.     No shoulders, neck, elbow involvement. No hip pain.     Tendonitis of right ankle. No pain.     Feet and toe pain intermittent. No swelling. No stiffness.    No back pain.     No history of autoimmune disease.     Mother: OA    Work: retired. Daily activities: chores.     Review of Systems   Musculoskeletal:  Positive for arthritis.      Constitutional, HEENT, cardiovascular, pulmonary, gi and gu systems are negative, except as otherwise noted.      Objective    BP (!) 147/86 (BP Location: Right arm, Patient Position: Sitting, Cuff Size: Adult Large)   Pulse 103   Temp 97.3  F (36.3  C)   Resp 20   Wt 105.4 kg (232 lb 6.4 oz)   SpO2 97%   BMI 32.41 kg/m    Body mass index is 32.41 kg/m .  Physical Exam   GENERAL: alert and no distress  EYES: Eyes grossly normal to inspection, PERRL and conjunctivae and sclerae normal  HENT: mouth without ulcers or lesions  NECK: no adenopathy thyroid normal to palpation  RESP: lungs clear to auscultation - no rales, rhonchi or wheezes  CV: regular rate and rhythm, normal S1 S2, no S3 or S4,  MSK: inspection of the hands reveals diffuse edema over the MCP joints and fingers. Decreased  strength. The remainder of joint exam is normal.     Results for orders placed or performed in visit on 11/06/23   XR Hand Bilateral G/E 3 Views     Status: None    Narrative    HAND BILATERAL THREE OR MORE VIEWS 11/6/2023 11:50 AM     HISTORY: Pain in both hands.     COMPARISON: None.       Impression    IMPRESSION: Mild thumb CMC joint arthrosis bilaterally. There is  normal bone mineralization and there are no erosions. No evidence of  an inflammatory arthropathy on either side. No definitive acute  fracture on either side. Apparent small linear lucency seen along the  volar base of the right ring finger at the PIP joint is favored to be  projectional,  correlation with point tenderness in this area is  advised.     Small 2 mm metallic density in the soft tissues of the left small  finger likely reflects a small foreign body.     MACARIO ALEJANDRO MD         SYSTEM ID:  [id number]   Results for orders placed or performed in visit on 11/06/23   Comprehensive metabolic panel (BMP + Alb, Alk Phos, ALT, AST, Total. Bili, TP)     Status: Abnormal   Result Value Ref Range    Sodium 136 135 - 145 mmol/L    Potassium 3.8 3.4 - 5.3 mmol/L    Carbon Dioxide (CO2) 24 22 - 29 mmol/L    Anion Gap 12 7 - 15 mmol/L    Urea Nitrogen 15.8 6.0 - 20.0 mg/dL    Creatinine 1.04 0.67 - 1.17 mg/dL    GFR Estimate 86 >60 mL/min/1.73m2    Calcium 9.1 8.6 - 10.0 mg/dL    Chloride 100 98 - 107 mmol/L    Glucose 127 (H) 70 - 99 mg/dL    Alkaline Phosphatase 80 40 - 129 U/L    AST 36 0 - 45 U/L    ALT 78 (H) 0 - 70 U/L    Protein Total 6.9 6.4 - 8.3 g/dL    Albumin 4.3 3.5 - 5.2 g/dL    Bilirubin Total 0.4 <=1.2 mg/dL   ESR: Erythrocyte sedimentation rate     Status: Normal   Result Value Ref Range    Erythrocyte Sedimentation Rate 12 0 - 20 mm/hr   CRP, inflammation     Status: Abnormal   Result Value Ref Range    CRP Inflammation 12.50 (H) <5.00 mg/L   Anti Nuclear Christy IgG by IFA with Reflex     Status: Abnormal   Result Value Ref Range    SAIRA interpretation Borderline Positive (A) Negative    SAIRA pattern 1 Speckled     SAIRA titer 1 1:40    UA Macroscopic with reflex to Microscopic and Culture     Status: Normal    Specimen: Urine, Midstream   Result Value Ref Range    Color Urine Yellow Colorless, Straw, Light Yellow, Yellow    Appearance Urine Clear Clear    Glucose Urine Negative Negative mg/dL    Bilirubin Urine Negative Negative    Ketones Urine Negative Negative mg/dL    Specific Gravity Urine 1.015 1.003 - 1.035    Blood Urine Negative Negative    pH Urine 5.5 5.0 - 7.0    Protein Albumin Urine Negative Negative mg/dL    Urobilinogen Urine 0.2 0.2, 1.0 E.U./dL    Nitrite Urine Negative  Negative    Leukocyte Esterase Urine Negative Negative    Narrative    Microscopic not indicated   Lipid panel reflex to direct LDL Fasting     Status: Abnormal   Result Value Ref Range    Cholesterol 195 <200 mg/dL    Triglycerides 218 (H) <150 mg/dL    Direct Measure HDL 32 (L) >=40 mg/dL    LDL Cholesterol Calculated 119 (H) <=100 mg/dL    Non HDL Cholesterol 163 (H) <130 mg/dL    Narrative    Cholesterol  Desirable:  <200 mg/dL    Triglycerides  Normal:  Less than 150 mg/dL  Borderline High:  150-199 mg/dL  High:  200-499 mg/dL  Very High:  Greater than or equal to 500 mg/dL    Direct Measure HDL  Female:  Greater than or equal to 50 mg/dL   Male:  Greater than or equal to 40 mg/dL    LDL Cholesterol  Desirable:  <100mg/dL  Above Desirable:  100-129 mg/dL   Borderline High:  130-159 mg/dL   High:  160-189 mg/dL   Very High:  >= 190 mg/dL    Non HDL Cholesterol  Desirable:  130 mg/dL  Above Desirable:  130-159 mg/dL  Borderline High:  160-189 mg/dL  High:  190-219 mg/dL  Very High:  Greater than or equal to 220 mg/dL   PSA, screen     Status: Normal   Result Value Ref Range    Prostate Specific Antigen Screen 0.60 0.00 - 3.50 ng/mL    Narrative    This result is obtained using the Roche Elecsys total PSA method on the thanh [id number] immunoassay analyzer. Results obtained with different assay methods or kits cannot be used interchangeably.   Hemoglobin A1c     Status: Normal   Result Value Ref Range    Hemoglobin A1C 5.4 0.0 - 5.6 %   TSH with free T4 reflex     Status: Normal   Result Value Ref Range    TSH 1.17 0.30 - 4.20 uIU/mL   Vitamin D Deficiency     Status: Normal   Result Value Ref Range    Vitamin D, Total (25-Hydroxy) 34 20 - 50 ng/mL    Narrative    Season, race, dietary intake, and treatment affect the concentration of 25-hydroxy-Vitamin D. Values may decrease during winter months and increase during summer months.    Vitamin D determination is routinely performed by an immunoassay specific for 25  hydroxyvitamin D3.  If an individual is on vitamin D2(ergocalciferol) supplementation, please specify 25 OH vitamin D2 and D3 level determination by LCMSMS test VITD23.     CBC with platelets and differential     Status: None   Result Value Ref Range    WBC Count 6.7 4.0 - 11.0 10e3/uL    RBC Count 4.95 4.40 - 5.90 10e6/uL    Hemoglobin 14.7 13.3 - 17.7 g/dL    Hematocrit 44.2 40.0 - 53.0 %    MCV 89 78 - 100 fL    MCH 29.7 26.5 - 33.0 pg    MCHC 33.3 31.5 - 36.5 g/dL    RDW 12.5 10.0 - 15.0 %    Platelet Count 268 150 - 450 10e3/uL    % Neutrophils 68 %    % Lymphocytes 23 %    % Monocytes 7 %    % Eosinophils 2 %    % Basophils 0 %    % Immature Granulocytes 0 %    Absolute Neutrophils 4.5 1.6 - 8.3 10e3/uL    Absolute Lymphocytes 1.5 0.8 - 5.3 10e3/uL    Absolute Monocytes 0.5 0.0 - 1.3 10e3/uL    Absolute Eosinophils 0.2 0.0 - 0.7 10e3/uL    Absolute Basophils 0.0 0.0 - 0.2 10e3/uL    Absolute Immature Granulocytes 0.0 <=0.4 10e3/uL   Sex Hormone Binding Globulin     Status: Normal   Result Value Ref Range    Sex Hormone Binding Globulin 23 11 - 80 nmol/L   CBC with Platelets & Differential     Status: None    Narrative    The following orders were created for panel order CBC with Platelets & Differential.  Procedure                               Abnormality         Status                     ---------                               -----------         ------                     CBC with platelets and d...[[id number]]                      Final result                 Please view results for these tests on the individual orders.   Testosterone Free and Total     Status: None (In process)    Narrative    The following orders were created for panel order Testosterone Free and Total.  Procedure                               Abnormality         Status                     ---------                               -----------         ------                     Sex Hormone Binding Glob...[[id number]]  Normal               Final result               Testosterone Free and Total[[id number]]                      In process                   Please view results for these tests on the individual orders.

## 2023-11-07 ENCOUNTER — TELEPHONE (OUTPATIENT)
Dept: PEDIATRICS | Facility: CLINIC | Age: 53
End: 2023-11-07
Payer: COMMERCIAL

## 2023-11-07 DIAGNOSIS — M05.742 RHEUMATOID ARTHRITIS INVOLVING BOTH HANDS WITH POSITIVE RHEUMATOID FACTOR (H): Primary | ICD-10-CM

## 2023-11-07 DIAGNOSIS — M05.741 RHEUMATOID ARTHRITIS INVOLVING BOTH HANDS WITH POSITIVE RHEUMATOID FACTOR (H): Primary | ICD-10-CM

## 2023-11-07 LAB
ANA PAT SER IF-IMP: ABNORMAL
ANA SER QL IF: ABNORMAL
ANA TITR SER IF: ABNORMAL {TITER}
CCP AB SER IA-ACNC: 12 U/ML
RHEUMATOID FACT SER NEPH-ACNC: 90 IU/ML
SHBG SERPL-SCNC: 23 NMOL/L (ref 11–80)

## 2023-11-07 RX ORDER — NAPROXEN 500 MG/1
500 TABLET ORAL 2 TIMES DAILY WITH MEALS
Qty: 60 TABLET | Refills: 1 | Status: SHIPPED | OUTPATIENT
Start: 2023-11-07 | End: 2023-11-28

## 2023-11-07 NOTE — TELEPHONE ENCOUNTER
Contacted patient. He has RA. Placed rheum referral. Would like patient to begin naproxen twice daily.   He has no further questions.   Nicky Wilson PA-C

## 2023-11-09 ENCOUNTER — TELEPHONE (OUTPATIENT)
Dept: RHEUMATOLOGY | Facility: CLINIC | Age: 53
End: 2023-11-09
Payer: COMMERCIAL

## 2023-11-09 LAB
TESTOST FREE SERPL-MCNC: 3.87 NG/DL
TESTOST SERPL-MCNC: 169 NG/DL (ref 240–950)

## 2023-11-09 NOTE — TELEPHONE ENCOUNTER
Health Call Center    Phone Message    May a detailed message be left on voicemail: no     Reason for Call: Other: Pt calling requesting to speak with one of Dr. Santiago's nurses regarding rheumatoid arthritis. Pt was not forthcoming with information, was very difficult to ascertain the reason for his call.  Pt was informed that because he has not yet been seen by Dr. Santiago, he would not technically be considered a patient of our department yet, and our nursing team would not be able to assess him or provide him with any recommendations. Pt reported that is not what he is looking for, would just like a call back from an RN.     Action Taken: Message routed to:  [facility] ([facility] [place]    Travel Screening: Not Applicable

## 2023-11-21 ENCOUNTER — LAB (OUTPATIENT)
Dept: LAB | Facility: CLINIC | Age: 53
End: 2023-11-21
Attending: PHYSICIAN ASSISTANT
Payer: COMMERCIAL

## 2023-11-21 ENCOUNTER — HOSPITAL ENCOUNTER (OUTPATIENT)
Dept: GENERAL RADIOLOGY | Facility: CLINIC | Age: 53
Discharge: HOME | End: 2023-11-21
Attending: INTERNAL MEDICINE
Payer: COMMERCIAL

## 2023-11-21 ENCOUNTER — OFFICE VISIT (OUTPATIENT)
Dept: RHEUMATOLOGY | Facility: CLINIC | Age: 53
End: 2023-11-21
Attending: PHYSICIAN ASSISTANT
Payer: COMMERCIAL

## 2023-11-21 VITALS
OXYGEN SATURATION: 99 % | DIASTOLIC BLOOD PRESSURE: 121 MMHG | TEMPERATURE: 98.8 F | WEIGHT: 231 LBS | SYSTOLIC BLOOD PRESSURE: 159 MMHG | HEART RATE: 106 BPM | BODY MASS INDEX: 32.22 KG/M2

## 2023-11-21 DIAGNOSIS — M25.50 POLYARTHRALGIA: Primary | ICD-10-CM

## 2023-11-21 DIAGNOSIS — R76.8 RHEUMATOID FACTOR POSITIVE: ICD-10-CM

## 2023-11-21 DIAGNOSIS — R76.8 POSITIVE ANA (ANTINUCLEAR ANTIBODY): ICD-10-CM

## 2023-11-21 DIAGNOSIS — R76.8 CYCLIC CITRULLINATED PEPTIDE (CCP) ANTIBODY POSITIVE: ICD-10-CM

## 2023-11-21 DIAGNOSIS — Z79.52 LONG TERM (CURRENT) USE OF SYSTEMIC STEROIDS: ICD-10-CM

## 2023-11-21 DIAGNOSIS — M25.50 POLYARTHRALGIA: ICD-10-CM

## 2023-11-21 DIAGNOSIS — R74.01 ELEVATED ALANINE AMINOTRANSFERASE (ALT) LEVEL: ICD-10-CM

## 2023-11-21 LAB — CK SERPL-CCNC: 78 U/L (ref 39–308)

## 2023-11-21 PROCEDURE — 82550 ASSAY OF CK (CPK): CPT

## 2023-11-21 PROCEDURE — 86481 TB AG RESPONSE T-CELL SUSP: CPT

## 2023-11-21 PROCEDURE — 86225 DNA ANTIBODY NATIVE: CPT

## 2023-11-21 PROCEDURE — 86235 NUCLEAR ANTIGEN ANTIBODY: CPT

## 2023-11-21 PROCEDURE — 82955 ASSAY OF G6PD ENZYME: CPT

## 2023-11-21 PROCEDURE — 86704 HEP B CORE ANTIBODY TOTAL: CPT

## 2023-11-21 PROCEDURE — 99213 OFFICE O/P EST LOW 20 MIN: CPT | Performed by: INTERNAL MEDICINE

## 2023-11-21 PROCEDURE — 71046 X-RAY EXAM CHEST 2 VIEWS: CPT

## 2023-11-21 PROCEDURE — 86706 HEP B SURFACE ANTIBODY: CPT

## 2023-11-21 PROCEDURE — 36415 COLL VENOUS BLD VENIPUNCTURE: CPT

## 2023-11-21 PROCEDURE — 87340 HEPATITIS B SURFACE AG IA: CPT

## 2023-11-21 PROCEDURE — 86780 TREPONEMA PALLIDUM: CPT

## 2023-11-21 PROCEDURE — 71046 X-RAY EXAM CHEST 2 VIEWS: CPT | Mod: 26 | Performed by: RADIOLOGY

## 2023-11-21 PROCEDURE — 99205 OFFICE O/P NEW HI 60 MIN: CPT | Performed by: INTERNAL MEDICINE

## 2023-11-21 RX ORDER — PREDNISONE 5 MG/1
TABLET ORAL
Qty: 9 TABLET | Refills: 0 | Status: SHIPPED | OUTPATIENT
Start: 2023-11-21 | End: 2023-11-30

## 2023-11-21 ASSESSMENT — PAIN SCALES - GENERAL: PAINLEVEL: MODERATE PAIN (5)

## 2023-11-21 NOTE — LETTER
11/21/2023       RE: Geovanni Galvez  [address] [place] [address]     Dear Colleague,    Thank you for referring your patient, Geovanni Galvez, to the [facility] RHEUMATOLOGY at [facility]. Please see a copy of my visit note below.                       11/21/2023    Chief Complaint/Reason for Visit: polyarthralgia    HPI:    Geovanni Galvez is a 53 year old White male with past medical history listed below. He is a retired . He is c/o pain and swelling of hands that started two months ago. He also has pain of knees and feet as well without swelling. Morning stiffness lasts for about an hour. He is also c/o muscle pain of arms, shoulder and thighs. He was taking ibuprofen 600 mg x 6 weeks and later was changed to naproxen 400 mg in the AM after he saw his PCP. No changes in vision, jaw claudication or headache. FH of OA in mom and grandmother. Denied smoking or using alcohol.     REVIEW OF SYSTEMS    General - neg for weight loss and pos for fatigue   HEENT - denied dry eyes, dry mouth or uveitis, oral ulcers  Cardiovascular - neg for chest pain  Respiratory - neg for shortness of breath or cough   Gastrointestinal - neg for bloody diarrhea   Genitourinary - neg for blood in urine   Skin - neg for skin rashes, raynaud's  Neuro -neg for stroke or seizures   Hematologic - neg for easy bruising or blood clots   Psych - neg for anxiety or depression     Past Medical History:   Diagnosis Date    Hypertension      Past Surgical History:   Procedure Laterality Date    COLONOSCOPY N/A 11/17/2022    Procedure: COLONOSCOPY;  Surgeon: John Blank MD;  Location:  GI     Family History   Problem Relation Age of Onset    Alcohol/Drug Father     Cancer Father         pancreatic    Colon Cancer No family hx of      Social History     Socioeconomic History    Marital status:      Spouse name: None    Number of  children: None    Years of education: None    Highest education level: None   Tobacco Use    Smoking status: Never    Smokeless tobacco: Never   Vaping Use    Vaping Use: Never used   Substance and Sexual Activity    Alcohol use: Not Currently     Comment: last drink 2014    Drug use: No    Sexual activity: Yes     Social Determinants of Health     Financial Resource Strain: Low Risk  (9/21/2022)    Overall Financial Resource Strain (CARDIA)     Difficulty of Paying Living Expenses: Not hard at all   Food Insecurity: No Food Insecurity (9/21/2022)    Hunger Vital Sign     Worried About Running Out of Food in the Last Year: Never true     Ran Out of Food in the Last Year: Never true   Transportation Needs: No Transportation Needs (9/21/2022)    PRAPARE - Transportation     Lack of Transportation (Medical): No     Lack of Transportation (Non-Medical): No   Physical Activity: Sufficiently Active (9/21/2022)    Exercise Vital Sign     Days of Exercise per Week: 3 days     Minutes of Exercise per Session: 60 min   Stress: No Stress Concern Present (9/21/2022)    [employer] [employer] - Occupational Stress Questionnaire     Feeling of Stress : Not at all   Social Connections: Socially Integrated (9/21/2022)    Social Connection and Isolation Panel [NHANES]     Frequency of Communication with Friends and Family: Twice a week     Frequency of Social Gatherings with Friends and Family: Twice a week     Attends Sikhism Services: More than 4 times per year     Active Member of Clubs or Organizations: Yes     Marital Status:    Interpersonal Safety: Unknown (11/6/2023)    Interpersonal Safety     Do you feel physically and emotionally safe where you currently live?: Patient refused     Within the past 12 months, have you been hit, slapped, kicked or otherwise physically hurt by someone?: Patient refused     Within the past 12 months, have you been humiliated or emotionally abused in other ways by  your partner or ex-partner?: Patient refused   Housing Stability: Low Risk  (9/21/2022)    Housing Stability Vital Sign     Unable to Pay for Housing in the Last Year: No     Number of Places Lived in the Last Year: 1     Unstable Housing in the Last Year: No       No Known Allergies    Current Outpatient Medications   Medication    citalopram (CELEXA) 20 MG tablet    losartan-hydrochlorothiazide (HYZAAR) 50-12.5 MG tablet    naproxen (NAPROSYN) 500 MG tablet    predniSONE (DELTASONE) 5 MG tablet    sildenafil (REVATIO) 20 MG tablet     No current facility-administered medications for this visit.       PHYSICAL EXAM    BP (!) 159/121 (BP Location: Left arm, Patient Position: Sitting, Cuff Size: Adult Regular)   Pulse 106   Temp 98.8  F (37.1  C) (Oral)   Wt 104.8 kg (231 lb)   SpO2 99%   BMI 32.22 kg/m      General: Alert, No apparent distress and Oriented to person, place, and time  Psych: Affect euthymic  Eyes: Sclera noninjected  Cardiovascular: Regular rate and rhythm, Normal S1 and S2 and No murmurs, rubs or gallops  Respiratory: Clear to auscultation with no wheezing or crackles  Neuro: Normal gait and Able to arise from seated position unassisted  Musculoskeletal: Hands:  Normal. Mild puffiness of right and left 2nd MCP  Wrists:  Normal.  Elbows:  Normal.  Shoulders:  Normal.  Feet:  Normal.  Ankles:  Normal.  Knees:  Normal.    LABS   Reviewed as below.     Nov 2023  Vit D,TSH normal   A1c normal   CCP 12  RF 90  CRP 12.50  CBC normal   ALT 78, AST normal   Creatinine normal  UA neg for blood and protein   SAIRA borderline pos 1;40, speckled    2021  HCV neg   HIV neg     HAND BILATERAL THREE OR MORE VIEWS 11/6/2023 11:50 AM      HISTORY: Pain in both hands.      COMPARISON: None.                                                                       IMPRESSION: Mild thumb CMC joint arthrosis bilaterally. There is  normal bone mineralization and there are no erosions. No evidence of  an inflammatory  arthropathy on either side. No definitive acute  fracture on either side. Apparent small linear lucency seen along the  volar base of the right ring finger at the PIP joint is favored to be  projectional, correlation with point tenderness in this area is  advised.      Small 2 mm metallic density in the soft tissues of the left small  finger likely reflects a small foreign body.     CHEST TWO VIEWS  July 14, 2021 10:49 AM      HISTORY: 50-year-old patient with chest wall pain on the left.                                                                       IMPRESSION: Since February 21, 2012, heart size is normal. No pleural  effusion, pneumothorax, or abnormal area of consolidation.     SHOULDER RIGHT THREE OR MORE VIEWS  8/14/2019 4:35 PM      HISTORY: Acute pain of right shoulder.     COMPARISON: None.                                                                      IMPRESSION: No acute or chronic bony abnormality. Distal acromial  morphology is type II and humeral acromial distance appears adequate.       ASSESSMENT      1. Polyarthralgia    2. Rheumatoid factor positive    3. Cyclic citrullinated peptide (CCP) antibody positive    4. Positive SAIRA (antinuclear antibody)    5. Elevated alanine aminotransferase (ALT) level    6. Long term (current) use of systemic steroids      (M25.50) Polyarthralgia  (primary encounter diagnosis)  (R76.8) Rheumatoid factor positive  (R76.8) Cyclic citrullinated peptide (CCP) antibody positive  Comment: RF-90, CCP-12. X ray hands - b/l CMC arthrosis.  He is c/o pain and swelling of hands that started two months ago. He also has pain of knees and feet as well without swelling. Morning stiffness lasts for about an hour. Noted mild puffiness of right and left second MCP. It appears he has a mild case of RA. It is probably too early to catch any findings on X ray.   Plan:   Check labs and CXR as below.   Will avoid methotrexate and leflunomide due to elevated LFT.  Will avoid  plaquenil as he may likely need more than that to control disease activity and patient also prefers to avoid HCQ after side effects were explained.   Discussed about SSZ and printed information was provided. Patient and wife are expecting a baby in Jan 2024. Discussed the rare side effect of SSZ causing reversible oligospermia. He said its ok to use SSZ as they do not plan on having another child for the next 5 years.   If G6PD is normal, we will do  mg BID for the first two weeks followed by 1000 mg BID thereafter.   Short course of prednisone to help with active RA - Prednisone 7.5 mg daily x 3 days, 5 mg daily x 3 days and 2.5 mg daily x 3 days and stop.    Patient counseled on the adverse effects of Sulfasalazine including infection, cytopenia, blood disorders or liver dysfunction or damage. Symptoms can include sore throat fever paleness purple spots on your skin, yellowing of your skin or the whites of your eyes; Serious skin disorders. Symptoms can include: flu-like symptoms, painful red or purple rash ,blistering , peeling skin; Kidney damage. Symptoms can include: difficulty urinating, making less urine, or not urinating at all. Patient counseled on the need for regular blood work to monitor . No known h/o G6PD deficiency.     I have explained to pt the potential adverse effects of long term use of steroids, including suppressing immune system making pts prone to infections, cataracts, glaucoma, thinning of skin, diabetes, increasing blood pressure, thinning of bones, fractures, AVN, weight gain, importance of taking osteoporosis protection with daily Jayesh + vitamin D. Patient verbalized understanding and agrees to procced.     Orders Placed This Encounter   Procedures    X-ray Chest 2 views    Hepatitis B core antibody    Hepatitis B surface antigen    Hepatitis B Surface Antibody    Treponema Abs w Reflex to RPR and Titer    Glucose 6 phosphate dehydrogenase    DNA double stranded antibodies     Marie JEFF Antibody IgG    RNP Antibody IgG    SSA Ro JEFF Antibody IgG    SSB La JEFF Antibody IgG    CK total    Quantiferon TB Gold Plus         (R76.8) Positive SAIRA (antinuclear antibody)  Comment: noted, 1:40, speckled. No clinical features of CTD.   Plan: check additional labs as below.    Elevated ALT  Comment: noted. Will avoid methotrexate and leflunomide.    Plan: monitor     Use of systemic steroids  Comment : prednisone   Plan : I have explained to pt the potential adverse effects of long term use of steroids, including suppressing immune system making pts prone to infections, cataracts, glaucoma, thinning of skin, diabetes, increasing blood pressure, thinning of bones, fractures, AVN, weight gain, importance of taking osteoporosis protection with daily Jayesh + vitamin D. Patient verbalized understanding and agrees to procced.     RTC - 6 weeks     I spent 60 minutes on the date of the encounter doing chart review, history and exam, documentation and orders per the note.      RONA GOMEZ MD    Division of Rheumatic & Autoimmune Diseases  [facility]

## 2023-11-21 NOTE — PROGRESS NOTES
11/21/2023    Chief Complaint/Reason for Visit: polyarthralgia    HPI:    Geovanni Galvez is a 53 year old White male with past medical history listed below. He is a retired . He is c/o pain and swelling of hands that started two months ago. He also has pain of knees and feet as well without swelling. Morning stiffness lasts for about an hour. He is also c/o muscle pain of arms, shoulder and thighs. He was taking ibuprofen 600 mg x 6 weeks and later was changed to naproxen 400 mg in the AM after he saw his PCP. No changes in vision, jaw claudication or headache. FH of OA in mom and grandmother. Denied smoking or using alcohol.     REVIEW OF SYSTEMS    General - neg for weight loss and pos for fatigue   HEENT - denied dry eyes, dry mouth or uveitis, oral ulcers  Cardiovascular - neg for chest pain  Respiratory - neg for shortness of breath or cough   Gastrointestinal - neg for bloody diarrhea   Genitourinary - neg for blood in urine   Skin - neg for skin rashes, raynaud's  Neuro -neg for stroke or seizures   Hematologic - neg for easy bruising or blood clots   Psych - neg for anxiety or depression     Past Medical History:   Diagnosis Date    Hypertension      Past Surgical History:   Procedure Laterality Date    COLONOSCOPY N/A 11/17/2022    Procedure: COLONOSCOPY;  Surgeon: John Blank MD;  Location:  GI     Family History   Problem Relation Age of Onset    Alcohol/Drug Father     Cancer Father         pancreatic    Colon Cancer No family hx of      Social History     Socioeconomic History    Marital status:      Spouse name: None    Number of children: None    Years of education: None    Highest education level: None   Tobacco Use    Smoking status: Never    Smokeless tobacco: Never   Vaping Use    Vaping Use: Never used   Substance and Sexual Activity    Alcohol use: Not Currently     Comment: last drink 2014    Drug use: No    Sexual activity: Yes     Social  Determinants of Health     Financial Resource Strain: Low Risk  (9/21/2022)    Overall Financial Resource Strain (CARDIA)     Difficulty of Paying Living Expenses: Not hard at all   Food Insecurity: No Food Insecurity (9/21/2022)    Hunger Vital Sign     Worried About Running Out of Food in the Last Year: Never true     Ran Out of Food in the Last Year: Never true   Transportation Needs: No Transportation Needs (9/21/2022)    PRAPARE - Transportation     Lack of Transportation (Medical): No     Lack of Transportation (Non-Medical): No   Physical Activity: Sufficiently Active (9/21/2022)    Exercise Vital Sign     Days of Exercise per Week: 3 days     Minutes of Exercise per Session: 60 min   Stress: No Stress Concern Present (9/21/2022)    [employer] - Occupational Stress Questionnaire     Feeling of Stress : Not at all   Social Connections: Socially Integrated (9/21/2022)    Social Connection and Isolation Panel [NHANES]     Frequency of Communication with Friends and Family: Twice a week     Frequency of Social Gatherings with Friends and Family: Twice a week     Attends Mosque Services: More than 4 times per year     Active Member of Clubs or Organizations: Yes     Marital Status:    Interpersonal Safety: Unknown (11/6/2023)    Interpersonal Safety     Do you feel physically and emotionally safe where you currently live?: Patient refused     Within the past 12 months, have you been hit, slapped, kicked or otherwise physically hurt by someone?: Patient refused     Within the past 12 months, have you been humiliated or emotionally abused in other ways by your partner or ex-partner?: Patient refused   Housing Stability: Low Risk  (9/21/2022)    Housing Stability Vital Sign     Unable to Pay for Housing in the Last Year: No     Number of Places Lived in the Last Year: 1     Unstable Housing in the Last Year: No       No Known Allergies    Current Outpatient Medications    Medication    citalopram (CELEXA) 20 MG tablet    losartan-hydrochlorothiazide (HYZAAR) 50-12.5 MG tablet    naproxen (NAPROSYN) 500 MG tablet    predniSONE (DELTASONE) 5 MG tablet    sildenafil (REVATIO) 20 MG tablet     No current facility-administered medications for this visit.       PHYSICAL EXAM    BP (!) 159/121 (BP Location: Left arm, Patient Position: Sitting, Cuff Size: Adult Regular)   Pulse 106   Temp 98.8  F (37.1  C) (Oral)   Wt 104.8 kg (231 lb)   SpO2 99%   BMI 32.22 kg/m      General: Alert, No apparent distress and Oriented to person, place, and time  Psych: Affect euthymic  Eyes: Sclera noninjected  Cardiovascular: Regular rate and rhythm, Normal S1 and S2 and No murmurs, rubs or gallops  Respiratory: Clear to auscultation with no wheezing or crackles  Neuro: Normal gait and Able to arise from seated position unassisted  Musculoskeletal: Hands:  Normal. Mild puffiness of right and left 2nd MCP  Wrists:  Normal.  Elbows:  Normal.  Shoulders:  Normal.  Feet:  Normal.  Ankles:  Normal.  Knees:  Normal.    LABS   Reviewed as below.     Nov 2023  Vit D,TSH normal   A1c normal   CCP 12  RF 90  CRP 12.50  CBC normal   ALT 78, AST normal   Creatinine normal  UA neg for blood and protein   SAIRA borderline pos 1;40, speckled    2021  HCV neg   HIV neg     HAND BILATERAL THREE OR MORE VIEWS 11/6/2023 11:50 AM      HISTORY: Pain in both hands.      COMPARISON: None.                                                                       IMPRESSION: Mild thumb CMC joint arthrosis bilaterally. There is  normal bone mineralization and there are no erosions. No evidence of  an inflammatory arthropathy on either side. No definitive acute  fracture on either side. Apparent small linear lucency seen along the  volar base of the right ring finger at the PIP joint is favored to be  projectional, correlation with point tenderness in this area is  advised.      Small 2 mm metallic density in the soft tissues of the  left small  finger likely reflects a small foreign body.     CHEST TWO VIEWS  July 14, 2021 10:49 AM      HISTORY: 50-year-old patient with chest wall pain on the left.                                                                       IMPRESSION: Since February 21, 2012, heart size is normal. No pleural  effusion, pneumothorax, or abnormal area of consolidation.     SHOULDER RIGHT THREE OR MORE VIEWS  8/14/2019 4:35 PM      HISTORY: Acute pain of right shoulder.     COMPARISON: None.                                                                      IMPRESSION: No acute or chronic bony abnormality. Distal acromial  morphology is type II and humeral acromial distance appears adequate.       ASSESSMENT      1. Polyarthralgia    2. Rheumatoid factor positive    3. Cyclic citrullinated peptide (CCP) antibody positive    4. Positive SAIRA (antinuclear antibody)    5. Elevated alanine aminotransferase (ALT) level    6. Long term (current) use of systemic steroids      (M25.50) Polyarthralgia  (primary encounter diagnosis)  (R76.8) Rheumatoid factor positive  (R76.8) Cyclic citrullinated peptide (CCP) antibody positive  Comment: RF-90, CCP-12. X ray hands - b/l CMC arthrosis.  He is c/o pain and swelling of hands that started two months ago. He also has pain of knees and feet as well without swelling. Morning stiffness lasts for about an hour. Noted mild puffiness of right and left second MCP. It appears he has a mild case of RA. It is probably too early to catch any findings on X ray.   Plan:   Check labs and CXR as below.   Will avoid methotrexate and leflunomide due to elevated LFT.  Will avoid plaquenil as he may likely need more than that to control disease activity and patient also prefers to avoid HCQ after side effects were explained.   Discussed about SSZ and printed information was provided. Patient and wife are expecting a baby in Jan 2024. Discussed the rare side effect of SSZ causing reversible  oligospermia. He said its ok to use SSZ as they do not plan on having another child for the next 5 years.   If G6PD is normal, we will do  mg BID for the first two weeks followed by 1000 mg BID thereafter.   Short course of prednisone to help with active RA - Prednisone 7.5 mg daily x 3 days, 5 mg daily x 3 days and 2.5 mg daily x 3 days and stop.    Patient counseled on the adverse effects of Sulfasalazine including infection, cytopenia, blood disorders or liver dysfunction or damage. Symptoms can include sore throat fever paleness purple spots on your skin, yellowing of your skin or the whites of your eyes; Serious skin disorders. Symptoms can include: flu-like symptoms, painful red or purple rash ,blistering , peeling skin; Kidney damage. Symptoms can include: difficulty urinating, making less urine, or not urinating at all. Patient counseled on the need for regular blood work to monitor . No known h/o G6PD deficiency.     I have explained to pt the potential adverse effects of long term use of steroids, including suppressing immune system making pts prone to infections, cataracts, glaucoma, thinning of skin, diabetes, increasing blood pressure, thinning of bones, fractures, AVN, weight gain, importance of taking osteoporosis protection with daily Jayesh + vitamin D. Patient verbalized understanding and agrees to procced.     Orders Placed This Encounter   Procedures    X-ray Chest 2 views    Hepatitis B core antibody    Hepatitis B surface antigen    Hepatitis B Surface Antibody    Treponema Abs w Reflex to RPR and Titer    Glucose 6 phosphate dehydrogenase    DNA double stranded antibodies    Marie JEFF Antibody IgG    RNP Antibody IgG    SSA Ro JEFF Antibody IgG    SSB La JEFF Antibody IgG    CK total    Quantiferon TB Gold Plus         (R76.8) Positive SAIRA (antinuclear antibody)  Comment: noted, 1:40, speckled. No clinical features of CTD.   Plan: check additional labs as below.    Elevated ALT  Comment:  noted. Will avoid methotrexate and leflunomide.    Plan: monitor     Use of systemic steroids  Comment : prednisone   Plan : I have explained to pt the potential adverse effects of long term use of steroids, including suppressing immune system making pts prone to infections, cataracts, glaucoma, thinning of skin, diabetes, increasing blood pressure, thinning of bones, fractures, AVN, weight gain, importance of taking osteoporosis protection with daily Jayesh + vitamin D. Patient verbalized understanding and agrees to procced.     RTC - 6 weeks     I spent 60 minutes on the date of the encounter doing chart review, history and exam, documentation and orders per the note.      RONA GOMEZ MD    Division of Rheumatic & Autoimmune Diseases  [facility]

## 2023-11-21 NOTE — NURSING NOTE
Chief Complaint   Patient presents with    Consult   BP (!) 170/108 (BP Location: Left arm, Patient Position: Sitting, Cuff Size: Adult Regular)   Pulse 106   Temp 98.8  F (37.1  C) (Oral)   Wt 104.8 kg (231 lb)   SpO2 99%   BMI 32.22 kg/m  Sahra Brandon on 11/21/2023 at 2:52 PM

## 2023-11-22 ENCOUNTER — MYC REFILL (OUTPATIENT)
Dept: PEDIATRICS | Facility: CLINIC | Age: 53
End: 2023-11-22
Payer: COMMERCIAL

## 2023-11-22 DIAGNOSIS — F41.1 GAD (GENERALIZED ANXIETY DISORDER): ICD-10-CM

## 2023-11-22 LAB
DSDNA AB SER-ACNC: 0.6 IU/ML
ENA SM IGG SER IA-ACNC: 1.3 U/ML
ENA SM IGG SER IA-ACNC: NEGATIVE
ENA SS-A AB SER IA-ACNC: <0.5 U/ML
ENA SS-A AB SER IA-ACNC: NEGATIVE
ENA SS-B IGG SER IA-ACNC: <0.6 U/ML
ENA SS-B IGG SER IA-ACNC: NEGATIVE
HBV CORE AB SERPL QL IA: NONREACTIVE
HBV SURFACE AB SERPL IA-ACNC: 12.43 M[IU]/ML
HBV SURFACE AB SERPL IA-ACNC: REACTIVE M[IU]/ML
HBV SURFACE AG SERPL QL IA: NONREACTIVE
T PALLIDUM AB SER QL: NONREACTIVE
U1 SNRNP IGG SER IA-ACNC: 2 U/ML
U1 SNRNP IGG SER IA-ACNC: NEGATIVE

## 2023-11-23 LAB — G6PD RBC-CCNT: 13.6 U/G HB

## 2023-11-24 DIAGNOSIS — I10 ESSENTIAL HYPERTENSION WITH GOAL BLOOD PRESSURE LESS THAN 140/90: ICD-10-CM

## 2023-11-24 DIAGNOSIS — F41.1 GAD (GENERALIZED ANXIETY DISORDER): ICD-10-CM

## 2023-11-24 LAB
GAMMA INTERFERON BACKGROUND BLD IA-ACNC: 0.12 IU/ML
M TB IFN-G BLD-IMP: NEGATIVE
M TB IFN-G CD4+ BCKGRND COR BLD-ACNC: 9.88 IU/ML
MITOGEN IGNF BCKGRD COR BLD-ACNC: 0.01 IU/ML
MITOGEN IGNF BCKGRD COR BLD-ACNC: 0.09 IU/ML
QUANTIFERON MITOGEN: 10 IU/ML
QUANTIFERON NIL TUBE: 0.12 IU/ML
QUANTIFERON TB1 TUBE: 0.21 IU/ML
QUANTIFERON TB2 TUBE: 0.13

## 2023-11-24 RX ORDER — CITALOPRAM HYDROBROMIDE 20 MG/1
20 TABLET ORAL DAILY
Qty: 90 TABLET | Refills: 0 | Status: SHIPPED | OUTPATIENT
Start: 2023-11-24 | End: 2024-04-15

## 2023-11-24 RX ORDER — LOSARTAN POTASSIUM AND HYDROCHLOROTHIAZIDE 12.5; 5 MG/1; MG/1
1 TABLET ORAL DAILY
Qty: 90 TABLET | Refills: 3 | Status: SHIPPED | OUTPATIENT
Start: 2023-11-24

## 2023-11-24 RX ORDER — CITALOPRAM HYDROBROMIDE 20 MG/1
20 TABLET ORAL DAILY
Qty: 90 TABLET | Refills: 0 | Status: SHIPPED | OUTPATIENT
Start: 2023-11-24 | End: 2023-11-28

## 2023-11-24 NOTE — TELEPHONE ENCOUNTER
Patient has refills on file at the pharmacy. He needs to contact them for the Hyzaar.     NATALIE Luna on 11/24/2023 at 1:46 PM

## 2023-11-27 ENCOUNTER — MYC MEDICAL ADVICE (OUTPATIENT)
Dept: RHEUMATOLOGY | Facility: CLINIC | Age: 53
End: 2023-11-27
Payer: COMMERCIAL

## 2023-11-27 DIAGNOSIS — R76.8 RHEUMATOID FACTOR POSITIVE: Primary | ICD-10-CM

## 2023-11-27 RX ORDER — SULFASALAZINE 500 MG/1
TABLET, DELAYED RELEASE ORAL
Qty: 360 TABLET | Refills: 0 | Status: SHIPPED | OUTPATIENT
Start: 2023-11-27 | End: 2024-01-31

## 2023-11-28 ENCOUNTER — OFFICE VISIT (OUTPATIENT)
Dept: PEDIATRICS | Facility: CLINIC | Age: 53
End: 2023-11-28
Payer: COMMERCIAL

## 2023-11-28 VITALS
BODY MASS INDEX: 32.35 KG/M2 | WEIGHT: 226 LBS | RESPIRATION RATE: 16 BRPM | HEART RATE: 98 BPM | TEMPERATURE: 98.1 F | HEIGHT: 70 IN | DIASTOLIC BLOOD PRESSURE: 80 MMHG | SYSTOLIC BLOOD PRESSURE: 125 MMHG | OXYGEN SATURATION: 100 %

## 2023-11-28 DIAGNOSIS — I10 ESSENTIAL HYPERTENSION WITH GOAL BLOOD PRESSURE LESS THAN 140/90: ICD-10-CM

## 2023-11-28 DIAGNOSIS — R94.5 ABNORMAL RESULTS OF LIVER FUNCTION STUDIES: ICD-10-CM

## 2023-11-28 DIAGNOSIS — E78.5 HYPERLIPIDEMIA WITH TARGET LDL LESS THAN 130: ICD-10-CM

## 2023-11-28 DIAGNOSIS — R73.03 PREDIABETES: ICD-10-CM

## 2023-11-28 DIAGNOSIS — M06.9 RHEUMATOID ARTHRITIS INVOLVING MULTIPLE SITES, UNSPECIFIED WHETHER RHEUMATOID FACTOR PRESENT (H): ICD-10-CM

## 2023-11-28 DIAGNOSIS — R68.89 ABNORMAL CLINICAL FINDING: ICD-10-CM

## 2023-11-28 DIAGNOSIS — Z00.00 ROUTINE GENERAL MEDICAL EXAMINATION AT A HEALTH CARE FACILITY: Primary | ICD-10-CM

## 2023-11-28 DIAGNOSIS — F41.1 GAD (GENERALIZED ANXIETY DISORDER): ICD-10-CM

## 2023-11-28 LAB — SHBG SERPL-SCNC: 25 NMOL/L (ref 11–80)

## 2023-11-28 PROCEDURE — 84403 ASSAY OF TOTAL TESTOSTERONE: CPT | Performed by: INTERNAL MEDICINE

## 2023-11-28 PROCEDURE — 90686 IIV4 VACC NO PRSV 0.5 ML IM: CPT | Performed by: INTERNAL MEDICINE

## 2023-11-28 PROCEDURE — 36415 COLL VENOUS BLD VENIPUNCTURE: CPT | Performed by: INTERNAL MEDICINE

## 2023-11-28 PROCEDURE — 90677 PCV20 VACCINE IM: CPT | Performed by: INTERNAL MEDICINE

## 2023-11-28 PROCEDURE — 84270 ASSAY OF SEX HORMONE GLOBUL: CPT | Performed by: INTERNAL MEDICINE

## 2023-11-28 PROCEDURE — 91320 SARSCV2 VAC 30MCG TRS-SUC IM: CPT | Performed by: INTERNAL MEDICINE

## 2023-11-28 PROCEDURE — 99396 PREV VISIT EST AGE 40-64: CPT | Mod: 25 | Performed by: INTERNAL MEDICINE

## 2023-11-28 PROCEDURE — 90480 ADMN SARSCOV2 VAC 1/ONLY CMP: CPT | Performed by: INTERNAL MEDICINE

## 2023-11-28 PROCEDURE — 99213 OFFICE O/P EST LOW 20 MIN: CPT | Mod: 25 | Performed by: INTERNAL MEDICINE

## 2023-11-28 PROCEDURE — 90471 IMMUNIZATION ADMIN: CPT | Performed by: INTERNAL MEDICINE

## 2023-11-28 PROCEDURE — 90472 IMMUNIZATION ADMIN EACH ADD: CPT | Performed by: INTERNAL MEDICINE

## 2023-11-28 RX ORDER — CITALOPRAM HYDROBROMIDE 20 MG/1
20 TABLET ORAL DAILY
Qty: 90 TABLET | Refills: 3 | Status: SHIPPED | OUTPATIENT
Start: 2023-11-28

## 2023-11-28 ASSESSMENT — PAIN SCALES - GENERAL: PAINLEVEL: MILD PAIN (2)

## 2023-11-28 NOTE — PROGRESS NOTES
SUBJECTIVE:   Uri is a 53 year old, presenting for the following:  Physical        11/28/2023     8:53 AM   Additional Questions   Roomed by tom   Accompanied by self         11/28/2023     8:53 AM   Patient Reported Additional Medications   Patient reports taking the following new medications no       Healthy Habits:     Getting at least 3 servings of Calcium per day:  Yes    Bi-annual eye exam:  Yes    Dental care twice a year:  Yes    Sleep apnea or symptoms of sleep apnea:  None    Diet:  Regular (no restrictions)    Frequency of exercise:  4-5 days/week    Duration of exercise:  15-30 minutes    Taking medications regularly:  Yes    Barriers to taking medications:  None    Medication side effects:  None    Additional concerns today:  No      Social History     Tobacco Use    Smoking status: Never    Smokeless tobacco: Never   Substance Use Topics    Alcohol use: Not Currently     Comment: last drink 2014 9/18/2022     8:20 AM   Alcohol Use   Prescreen: >3 drinks/day or >7 drinks/week? Not Applicable       Last PSA:   Prostate Specific Antigen Screen   Date Value Ref Range Status   11/06/2023 0.60 0.00 - 3.50 ng/mL Final   09/23/2022 0.85 0.00 - 4.00 ug/L Final       Reviewed orders with patient. Reviewed health maintenance and updated orders accordingly - Yes      Reviewed and updated as needed this visit by clinical staff   Tobacco  Allergies  Meds              Reviewed and updated as needed this visit by Provider                 Patient Active Problem List   Diagnosis    Prediabetes    Obesity    Hyperlipidemia with target LDL less than 130    Essential hypertension with goal blood pressure less than 140/90    ISH (generalized anxiety disorder)    Bilateral chronic knee pain    Rheumatoid arthritis involving multiple sites, unspecified whether rheumatoid factor present (H)     Current Outpatient Medications   Medication Sig Dispense Refill    citalopram (CELEXA) 20 MG tablet Take 1 tablet (20  "mg) by mouth daily 90 tablet 3    citalopram (CELEXA) 20 MG tablet Take 1 tablet (20 mg) by mouth daily 90 tablet 0    losartan-hydrochlorothiazide (HYZAAR) 50-12.5 MG tablet Take 1 tablet by mouth daily 90 tablet 3    predniSONE (DELTASONE) 5 MG tablet Take 1.5 tablets (7.5 mg) by mouth daily for 3 days, THEN 1 tablet (5 mg) daily for 3 days, THEN 0.5 tablets (2.5 mg) daily for 3 days. 9 tablet 0    sulfaSALAzine ER (AZULFIDINE EN) 500 MG EC tablet Take 1 tablet (500 mg) twice daily for 2 weeks followed by 2 tabs twice daily ( 1000 mg) thereafter. 360 tablet 0        Review of Systems  ROS:  A 10 point review of systems was completed and was negative apart from the pertinent positives as noted in the above history.     OBJECTIVE:   /84 (Cuff Size: Adult Large)   Pulse 98   Temp 98.1  F (36.7  C) (Tympanic)   Resp 16   Ht 1.778 m (5' 10\")   Wt 102.5 kg (226 lb)   SpO2 100%   BMI 32.43 kg/m      Physical Exam  GENERAL: healthy, alert and no distress  EYES: Eyes grossly normal to inspection, PERRL and conjunctivae and sclerae normal  HENT: ear canals and TM's normal, nose and mouth without ulcers or lesions  NECK: no adenopathy, no asymmetry, masses, or scars and thyroid normal to palpation  RESP: lungs clear to auscultation - no rales, rhonchi or wheezes  CV: regular rate and rhythm, normal S1 S2, no S3 or S4, no murmur, click or rub, no peripheral edema and peripheral pulses strong  ABDOMEN: soft, nontender, no hepatosplenomegaly, no masses and bowel sounds normal  MS: no gross musculoskeletal defects noted, no edema  SKIN: no suspicious lesions or rashes  NEURO: Normal strength and tone, mentation intact and speech normal  PSYCH: mentation appears normal, affect normal/bright    ASSESSMENT/PLAN:   (Z00.00) Routine general medical examination at a health care facility  (primary encounter diagnosis)    (M06.9) Rheumatoid arthritis involving multiple sites, unspecified whether rheumatoid factor present " "(H)  Comment:   Plan: recent dx, [facility] notes reviewed, plans to continue care through [facility].  Starting sulfasalazine    (R73.03) Prediabetes  Comment:      Lab Results   Component Value Date    A1C 5.4 11/06/2023    A1C 5.3 02/06/2018    Plan: A1c has improved, discussed healthy diet/weight mgmt    (E78.5) Hyperlipidemia with target LDL less than 130  Comment:   Recent Labs   Lab Test 11/06/23  1202 09/23/22  0800   CHOL 195 207*   HDL 32* 41   * 139*   TRIG 218* 133    Plan:   continue lifestyle/dietary measures    (I10) Essential hypertension with goal blood pressure less than 140/90  Comment:   Plan: BP at goal, states home readings have been higher, pt will track and forward home readings next few weeks    (F41.1) ISH (generalized anxiety disorder)  Comment: Adequate control.  Continue current medication.   Plan: citalopram (CELEXA) 20 MG tablet          (R94.5) Abnormal results of liver function studies  Comment: noted during [place] work-up for RA.  ALT mildly elevated.  Hx of fatty liver/prior imaging 2012  Lab Results   Component Value Date    ALT 78 11/06/2023    ALT 53 05/14/2019    Plan: US Abdomen Limited        Plan for follow-up US    (R68.89) Abnormal clinical finding  Comment: ?testosterone deficiency, repeat labwork today  Plan: Testosterone Free and Total            COUNSELING:   Reviewed preventive health counseling, as reflected in patient instructions       Regular exercise       Healthy diet/nutrition       Colorectal cancer screening       Prostate cancer screening      BMI:   Estimated body mass index is 32.43 kg/m  as calculated from the following:    Height as of this encounter: 1.778 m (5' 10\").    Weight as of this encounter: 102.5 kg (226 lb).   Weight management plan: Discussed healthy diet and exercise guidelines      He reports that he has never smoked. He has never used smokeless tobacco.            John Puri MD  [facility]  "

## 2023-11-28 NOTE — PATIENT INSTRUCTIONS
Please obtain some additional home readings and record over the next few weeks.  Goal blood pressure is less than 140/90.  If your home blood pressure cuff is out of date, the O[employer]ON brand units are reasonable (available on [employer] or at [employer] or [employer]). We generally recommend purchasing an arm cuff automatic unit.        We recommend checking your pressure in the morning after you have been awake for 15 to 30 minutes.  Remember to sit quietly for about 10 minutes before taking the reading and take 2 or 3 readings and take the average.   Please forward your results in the next 2 weeks.       Preventive Health Recommendations  Male Ages 50 - 64    Yearly exam:             See your health care provider every year in order to  o   Review health changes.   o   Discuss preventive care.    o   Review your medicines if your doctor has prescribed any.   Have a cholesterol test every 5 years, or more frequently if you are at risk for high cholesterol/heart disease.   Have a diabetes test (fasting glucose) every three years. If you are at risk for diabetes, you should have this test more often.   Have a colonoscopy at age 45, or have a yearly FIT test (stool test). These exams will check for colon cancer.    Talk with your health care provider about whether or not a prostate cancer screening test (PSA) is right for you.  You should be tested each year for STDs (sexually transmitted diseases), if you re at risk.     Shots: Get a flu shot each year. Get a tetanus shot every 10 years.     Nutrition:  Eat at least 5 servings of fruits and vegetables daily.   Eat whole-grain bread, whole-wheat pasta and brown rice instead of white grains and rice.   Get adequate Calcium and Vitamin D.     Lifestyle  Exercise for at least 150 minutes a week (30 minutes a day, 5 days a week). This will help you control your weight and prevent disease.   Limit alcohol to one drink per day.   No smoking.   Wear sunscreen to prevent skin cancer.    See your dentist every six months for an exam and cleaning.   See your eye doctor every 1 to 2 years.

## 2023-11-29 DIAGNOSIS — R76.8 RHEUMATOID FACTOR POSITIVE: Primary | ICD-10-CM

## 2023-11-29 RX ORDER — PREDNISONE 5 MG/1
TABLET ORAL
Qty: 21 TABLET | Refills: 0 | Status: SHIPPED | OUTPATIENT
Start: 2023-11-29 | End: 2023-12-20

## 2023-11-29 NOTE — TELEPHONE ENCOUNTER
Patient started the sulfasalazine a couple days ago.  He is still with pain in the hands, knees, feet.  Generalized body aches.  Has 2 (2.5mg) tablets left of the prednisone taper Rx'd on 11/21/23.  He is wondering about a bridge of additional prednisone until the sulfasalazine kicks in.  He states he was pain free at 7.5mg per day.     Lynn Mcdowell RN

## 2023-11-30 ENCOUNTER — HOSPITAL ENCOUNTER (OUTPATIENT)
Dept: ULTRASOUND IMAGING | Facility: CLINIC | Age: 53
Discharge: HOME | End: 2023-11-30
Attending: INTERNAL MEDICINE | Admitting: INTERNAL MEDICINE
Payer: COMMERCIAL

## 2023-11-30 DIAGNOSIS — R94.5 ABNORMAL RESULTS OF LIVER FUNCTION STUDIES: ICD-10-CM

## 2023-11-30 LAB
TESTOST FREE SERPL-MCNC: 6.55 NG/DL
TESTOST SERPL-MCNC: 287 NG/DL (ref 240–950)

## 2023-11-30 PROCEDURE — 76705 ECHO EXAM OF ABDOMEN: CPT

## 2023-12-04 ENCOUNTER — MYC MEDICAL ADVICE (OUTPATIENT)
Dept: PEDIATRICS | Facility: CLINIC | Age: 53
End: 2023-12-04
Payer: COMMERCIAL

## 2023-12-04 ENCOUNTER — MYC MEDICAL ADVICE (OUTPATIENT)
Dept: RHEUMATOLOGY | Facility: CLINIC | Age: 53
End: 2023-12-04
Payer: COMMERCIAL

## 2023-12-05 NOTE — TELEPHONE ENCOUNTER
Good morning Dr. Santiago,  Please advise on patients concerns in regards to ALT test and Methotraxate. Thank you.    Krystin Nuno RN  [facility]

## 2023-12-05 NOTE — TELEPHONE ENCOUNTER
Called and clarified with the patient when and where the most recent test was done. The most recent test was done at [facility] and ALT continues to be high. Explained to the patient that methotrexate may not be the best option for now but in future we can consider provided LFT normalizes. Will recheck labs in 3 months. Pt expressed understanding.

## 2023-12-11 NOTE — PROGRESS NOTES
DISCHARGE  Reason for Discharge: Patient has failed to schedule further appointments.    Equipment Issued: none    Discharge Plan: Patient to continue home program.    Referring Provider:  Savage Frias       10/31/23 0500   Appointment Info   Signing clinician's name / credentials Georgi Dominguez PT   Total/Authorized Visits PT E&T   Visits Used 2   Medical Diagnosis B knee pain   PT Tx Diagnosis B knee pain   Progress Note/Certification   Onset of illness/injury or Date of Surgery 10/09/23  (MD order)   Therapy Frequency once per week   Predicted Duration 6 weeks   Progress Note Completed Date 10/10/23   PT Goal 1   Goal Identifier stairs   Goal Description pt will be able to complete stairs in the AM with pain <3/10   Rationale to maximize safety and independence with performance of ADLs and functional tasks   Goal Progress About the same in AM   Target Date 12/05/23   Subjective Report   Subjective Report Doing about the same.  Limited by R ankle > B knee pain   Treatment Interventions (PT)   Interventions Therapeutic Procedure/Exercise;Neuromuscular Re-education   Therapeutic Procedure/Exercise   Therapeutic Procedures: strength, endurance, ROM, flexibillity minutes (34284) 20   PTRx Ther Proc 1 Ankle Eversion Strengthening With Theraband   PTRx Ther Proc 1 - Details x 30 GTB   PTRx Ther Proc 2 Theraband Ankle Inversion   PTRx Ther Proc 2 - Details x 30 GTB   PTRx Ther Proc 3 Hip Flexion Straight Leg Raise   PTRx Ther Proc 3 - Details x 30 B   PTRx Ther Proc 4 Single Leg Standing Deadlift   PTRx Ther Proc 4 - Details x 10 reps very challenging   PTRx Ther Proc 5 Toe Raises   PTRx Ther Proc 5 - Details 15x supported dbl.  Pain with single   PTRx Ther Proc 6 Creep walking   PTRx Ther Proc 6 - Details 2 passes.  Trialed side to side-not stable not given for home Just forward/back   Neuromuscular Re-education   PTRx Neuro Re-ed 1 Balance Single Leg Stance Supported and Unsupported   PTRx Neuro Re-ed 1 -  Details 30sec x 2 airex 30sec x 2 and 30sec x 1 EC.  Done B   Neuromuscular re-ed of mvmt, balance, coord, kinesthetic sense, posture, proprioception minutes (35068) 10   Education   Learner/Method Patient   Education Comments Pt educated on PT role and plan of care   Plan   Home program see PTRx phone   Plan for next session progress balance, step downs, lateral walks iwth band, SL squat   Total Session Time   Timed Code Treatment Minutes 30   Total Treatment Time (sum of timed and untimed services) 30

## 2024-01-01 ENCOUNTER — MYC MEDICAL ADVICE (OUTPATIENT)
Dept: RHEUMATOLOGY | Facility: CLINIC | Age: 54
End: 2024-01-01
Payer: COMMERCIAL

## 2024-01-02 NOTE — TELEPHONE ENCOUNTER
Lm for patient to call back regarding MyChart message sent.     Lynn Mcdowell RN     Muscle Cramps and Spasms    Muscle cramps and spasms occur when a muscle or muscles tighten and you have no control over this tightening (involuntary muscle contraction). They are a common problem and can develop in any muscle. The most common place is in the calf muscles of the leg. Muscle cramps and muscle spasms are both involuntary muscle contractions, but there are some differences between the two:    Muscle cramps are painful. They come and go and may last for a few seconds or up to 15 minutes. Muscle cramps are often more forceful and last longer than muscle spasms.  Muscle spasms may or may not be painful. They may also last just a few seconds or much longer.    Certain medical conditions, such as diabetes or Parkinson's disease, can make it more likely to develop cramps or spasms. However, cramps or spasms are usually not caused by a serious underlying problem. Common causes include:    Doing more physical work or exercise than your body is ready for (overexertion).  Overuse from repeating certain movements too many times.  Remaining in a certain position for a long period of time.  Improper preparation, form, or technique while playing a sport or doing an activity.  Dehydration.  Injury.  Side effects of some medicines.  Abnormally low levels of the salts and minerals in your blood (electrolytes), especially potassium and calcium. This could happen if you are taking water pills (diuretics) or if you are pregnant.    In many cases, the cause of muscle cramps or spasms is not known.    Follow these instructions at home:      Managing pain and stiffness      Try massaging, stretching, and relaxing the affected muscle. Do this for several minutes at a time.  If directed, apply heat to tight or tense muscles as often as told by your health care provider. Use the heat source that your health care provider recommends, such as a moist heat pack or a heating pad.    Place a towel between your skin and the heat source.  Leave the heat on for 20–30 minutes.  Remove the heat if your skin turns bright red. This is especially important if you are unable to feel pain, heat, or cold. You may have a greater risk of getting burned.  If directed, put ice on the affected area. This may help if you are sore or have pain after a cramp or spasm.    Put ice in a plastic bag.  Place a towel between your skin and the bag.  Leave the ice on for 20 minutes, 2–3 times a day.  Try taking hot showers or baths to help relax tight muscles.        Eating and drinking    Drink enough fluid to keep your urine pale yellow. Staying well hydrated may help prevent cramps or spasms.  Eat a healthy diet that includes plenty of nutrients to help your muscles function. A healthy diet includes fruits and vegetables, lean protein, whole grains, and low-fat or nonfat dairy products.        General instructions    If you are having frequent cramps, avoid intense exercise for several days.  Take over-the-counter and prescription medicines only as told by your health care provider.  Pay attention to any changes in your symptoms.  Keep all follow-up visits as told by your health care provider. This is important.    Contact a health care provider if:  Your cramps or spasms get more severe or happen more often.  Your cramps or spasms do not improve over time.    Summary  Muscle cramps and spasms occur when a muscle or muscles tighten and you have no control over this tightening (involuntary muscle contraction).  The most common place for cramps or spasms to occur is in the calf muscles of the leg.  Massaging, stretching, and relaxing the affected muscle may relieve the cramp or spasm.  Drink enough fluid to keep your urine pale yellow. Staying well hydrated may help prevent cramps or spasms.    ADDITIONAL NOTES AND INSTRUCTIONS    Please follow up with your Primary MD in 24-48 hr.  Seek immediate medical care for any new/worsening signs or symptoms.

## 2024-01-02 NOTE — TELEPHONE ENCOUNTER
Pt is returning call to Lynn, writer was unable to get a hold of her at time of call. Please call pt back at [phone]

## 2024-01-03 NOTE — TELEPHONE ENCOUNTER
Patient calling with pain and swelling in his fingers.  Hard to grasp objects or make a fist.  Currently on SSZ 1000mg BID. Patient is wondering what else he can take for the pain.  Previous prednisone taper worked great for him, but he knows he cannot be on it long term.  Uses [facility].     Lynn Mcdowell RN

## 2024-01-03 NOTE — TELEPHONE ENCOUNTER
Patient is calling back. Patient states Lynn had called him yesterday and he had called back twice trying to return Lynn's call. Patient states he was never able to get in contact. Patient is wanting to get a call back today from Lynn or to have the information sent to him on [employer].  Please advise.

## 2024-01-04 DIAGNOSIS — R76.8 RHEUMATOID FACTOR POSITIVE: Primary | ICD-10-CM

## 2024-01-04 RX ORDER — PREDNISONE 5 MG/1
TABLET ORAL
Qty: 38 TABLET | Refills: 0 | Status: SHIPPED | OUTPATIENT
Start: 2024-01-04 | End: 2024-01-24

## 2024-01-04 NOTE — TELEPHONE ENCOUNTER
Called patient and left message per request. Prednisone taper was sent to the pharmacy.  Humira info sent to the patient via [employer].  Advised to call back if interested to try and then PA can be started.     Lynn Mcdowell RN

## 2024-01-31 ENCOUNTER — OFFICE VISIT (OUTPATIENT)
Dept: RHEUMATOLOGY | Facility: CLINIC | Age: 54
End: 2024-01-31
Payer: COMMERCIAL

## 2024-01-31 ENCOUNTER — LAB (OUTPATIENT)
Dept: LAB | Facility: CLINIC | Age: 54
End: 2024-01-31
Payer: COMMERCIAL

## 2024-01-31 VITALS
SYSTOLIC BLOOD PRESSURE: 125 MMHG | BODY MASS INDEX: 32.57 KG/M2 | DIASTOLIC BLOOD PRESSURE: 86 MMHG | WEIGHT: 227.5 LBS | HEIGHT: 70 IN | HEART RATE: 100 BPM | OXYGEN SATURATION: 99 %

## 2024-01-31 DIAGNOSIS — M05.9 SEROPOSITIVE RHEUMATOID ARTHRITIS (H): ICD-10-CM

## 2024-01-31 DIAGNOSIS — M05.9 SEROPOSITIVE RHEUMATOID ARTHRITIS (H): Primary | ICD-10-CM

## 2024-01-31 DIAGNOSIS — R76.8 POSITIVE ANA (ANTINUCLEAR ANTIBODY): ICD-10-CM

## 2024-01-31 DIAGNOSIS — Z79.899 LONG-TERM USE OF HIGH-RISK MEDICATION: ICD-10-CM

## 2024-01-31 DIAGNOSIS — R76.8 CYCLIC CITRULLINATED PEPTIDE (CCP) ANTIBODY POSITIVE: ICD-10-CM

## 2024-01-31 DIAGNOSIS — R76.8 RHEUMATOID FACTOR POSITIVE: ICD-10-CM

## 2024-01-31 LAB
BASOPHILS # BLD AUTO: 0 10E3/UL (ref 0–0.2)
BASOPHILS NFR BLD AUTO: 1 %
EOSINOPHIL # BLD AUTO: 0.2 10E3/UL (ref 0–0.7)
EOSINOPHIL NFR BLD AUTO: 3 %
ERYTHROCYTE [DISTWIDTH] IN BLOOD BY AUTOMATED COUNT: 13.2 % (ref 10–15)
HCT VFR BLD AUTO: 45 % (ref 40–53)
HGB BLD-MCNC: 14.6 G/DL (ref 13.3–17.7)
IMM GRANULOCYTES # BLD: 0 10E3/UL
IMM GRANULOCYTES NFR BLD: 0 %
LYMPHOCYTES # BLD AUTO: 1.8 10E3/UL (ref 0.8–5.3)
LYMPHOCYTES NFR BLD AUTO: 31 %
MCH RBC QN AUTO: 29.6 PG (ref 26.5–33)
MCHC RBC AUTO-ENTMCNC: 32.4 G/DL (ref 31.5–36.5)
MCV RBC AUTO: 91 FL (ref 78–100)
MONOCYTES # BLD AUTO: 0.5 10E3/UL (ref 0–1.3)
MONOCYTES NFR BLD AUTO: 9 %
NEUTROPHILS # BLD AUTO: 3.3 10E3/UL (ref 1.6–8.3)
NEUTROPHILS NFR BLD AUTO: 56 %
PLATELET # BLD AUTO: 231 10E3/UL (ref 150–450)
RBC # BLD AUTO: 4.93 10E6/UL (ref 4.4–5.9)
WBC # BLD AUTO: 5.8 10E3/UL (ref 4–11)

## 2024-01-31 PROCEDURE — 36415 COLL VENOUS BLD VENIPUNCTURE: CPT

## 2024-01-31 PROCEDURE — 86140 C-REACTIVE PROTEIN: CPT

## 2024-01-31 PROCEDURE — 80053 COMPREHEN METABOLIC PANEL: CPT

## 2024-01-31 PROCEDURE — 85025 COMPLETE CBC W/AUTO DIFF WBC: CPT

## 2024-01-31 PROCEDURE — 99215 OFFICE O/P EST HI 40 MIN: CPT | Performed by: INTERNAL MEDICINE

## 2024-01-31 RX ORDER — SULFASALAZINE 500 MG/1
1500 TABLET, DELAYED RELEASE ORAL 2 TIMES DAILY
Qty: 540 TABLET | Refills: 0 | Status: SHIPPED | OUTPATIENT
Start: 2024-01-31 | End: 2024-02-06

## 2024-01-31 ASSESSMENT — PAIN SCALES - GENERAL: PAINLEVEL: MILD PAIN (3)

## 2024-01-31 NOTE — PATIENT INSTRUCTIONS
Brand Names: US  Azulfidine;     Azulfidine EN-tabs    Brand Names: Nazlini  PMS-Sulfasalazine;     Salazopyrin    What is this drug used for?  It is used to treat rheumatoid arthritis.  It is used to treat ulcerative colitis.  It may be given to you for other reasons. Talk with the doctor.    What do I need to tell my doctor BEFORE I take this drug?  If you are allergic to this drug; any part of this drug; or any other drugs, foods, or substances. Tell your doctor about the allergy and what signs you had.  If you have a sulfa allergy.  If you have any of these health problems: Bowel block, porphyria, or trouble passing urine.  This is not a list of all drugs or health problems that interact with this drug.  Tell your doctor and pharmacist about all of your drugs (prescription or OTC, natural products, vitamins) and health problems. You must check to make sure that it is safe for you to take this drug with all of your drugs and health problems. Do not start, stop, or change the dose of any drug without checking with your doctor.    What are some things I need to know or do while I take this drug?  All products:  Tell all of your health care providers that you take this drug. This includes your doctors, nurses, pharmacists, and dentists.  If you have asthma, talk with your doctor. You may be more sensitive to this drug.  Be careful if you have low levels of an enzyme called G6PD. Anemia may happen. Low levels of G6PD may be more likely in patients of , South , , and Mediterranean descent.  Have blood work checked as you have been told by the doctor. Talk with the doctor.  Have your urine checked as you have been told by your doctor.  This drug may affect certain lab tests. Tell all of your health care providers and lab workers that you take this drug.  This drug may change the color of urine or skin to a yellow or orange color. This is not harmful.  Very bad and sometimes deadly allergic  reactions, infections, heart problems, kidney problems, liver problems, lung problems, and blood problems have happened with this drug. Nerve or muscle problems that have not gone away have also happened with this drug. Talk with the doctor.  Sperm problems have happened while taking this drug. This may affect being able to father a child. This may go back to normal after the drug is stopped. If you have questions, talk with the doctor.  Tell your doctor if you are pregnant, plan on getting pregnant, or are breast-feeding. You will need to talk about the benefits and risks to you and the baby.  Delayed-release tablets:  You may see something that looks like the tablet in your stool. If this happens, talk with your doctor.    What are some side effects that I need to call my doctor about right away?  WARNING/CAUTION: Even though it may be rare, some people may have very bad and sometimes deadly side effects when taking a drug. Tell your doctor or get medical help right away if you have any of the following signs or symptoms that may be related to a very bad side effect:  Signs of an allergic reaction, like rash; hives; itching; red, swollen, blistered, or peeling skin with or without fever; wheezing; tightness in the chest or throat; trouble breathing, swallowing, or talking; unusual hoarseness; or swelling of the mouth, face, lips, tongue, or throat.  Signs of liver problems like dark urine, tiredness, decreased appetite, upset stomach or stomach pain, light-colored stools, throwing up, or yellow skin or eyes.  Signs of kidney problems like unable to pass urine, change in how much urine is passed, blood in the urine, or a big weight gain.  Signs of infection like fever, chills, very bad sore throat, ear or sinus pain, cough, more sputum or change in color of sputum, pain with passing urine, mouth sores, or wound that will not heal.  Feeling very tired or weak.  Pale skin.  Any unexplained bruising or  bleeding.  Swollen gland.  Shortness of breath, a big weight gain, or swelling in the arms or legs.  Severe skin reactions have happened with this drug. These have included Banks-Christiano syndrome (SJS), toxic epidermal necrolysis (TEN), and other severe skin reactions. Sometimes these have been deadly. Get medical help right away if you have signs like red, swollen, blistered, or peeling skin; other skin irritation (with or without fever); red or irritated eyes; or sores in your mouth, throat, nose, or eyes.    What are some other side effects of this drug?  All drugs may cause side effects. However, many people have no side effects or only have minor side effects. Call your doctor or get medical help if any of these side effects or any other side effects bother you or do not go away:  Headache.  Stomach pain or heartburn.  Upset stomach or throwing up.  Decreased appetite.  These are not all of the side effects that may occur. If you have questions about side effects, call your doctor. Call your doctor for medical advice about side effects.  You may report side effects to your national health agency.    How is this drug best taken?  Use this drug as ordered by your doctor. Read all information given to you. Follow all instructions closely.  All products:  Take after meals.  Keep taking this drug as you have been told by your doctor or other health care provider, even if you feel well.  Drink lots of noncaffeine liquids unless told to drink less liquid by your doctor.  Delayed-release tablets:  Swallow whole. Do not chew, break, or crush.    What do I do if I miss a dose?  Take a missed dose as soon as you think about it.  If it is close to the time for your next dose, skip the missed dose and go back to your normal time.  Do not take 2 doses at the same time or extra doses.    How do I store and/or throw out this drug?  Store at room temperature in a dry place. Do not store in a bathroom.  Keep all drugs in a  safe place. Keep all drugs out of the reach of children and pets.  Throw away unused or  drugs. Do not flush down a toilet or pour down a drain unless you are told to do so. Check with your pharmacist if you have questions about the best way to throw out drugs. There may be drug take-back programs in your area.    General drug facts  If your symptoms or health problems do not get better or if they become worse, call your doctor.  Do not share your drugs with others and do not take anyone else's drugs.  Some drugs may have another patient information leaflet. If you have any questions about this drug, please talk with your doctor, nurse, pharmacist, or other health care provider.  If you think there has been an overdose, call your poison control center or get medical care right away. Be ready to tell or show what was taken, how much, and when it happened.

## 2024-01-31 NOTE — NURSING NOTE
"Chief Complaint   Patient presents with    RECHECK     Polyarthralgia +5 more       Vitals:    01/31/24 1249 01/31/24 1252   BP: (!) 136/92 125/86   BP Location: Left arm    Patient Position: Sitting    Cuff Size: Adult Large    Pulse: 100    SpO2: 99%    Weight: 103.2 kg (227 lb 8 oz)    Height: 1.778 m (5' 10\")        Body mass index is 32.64 kg/m .      Henry Bell, ALPESH    "

## 2024-01-31 NOTE — PROGRESS NOTES
2024      Chief Complaint/Reason for Visit: seropositive RA    HPI:    Geovanni Galvez is a 53 year old White male with past medical history. listed below.  Today the patient reports feeling much better than last visit.  He continues to have bilateral hand pain but denied having swelling and morning stiffness lasts x 30 mins. He says he has noticed improvement after being started on SSZ compared to before. He is c/o pain of left thumb. No pain or swelling of ankles or knees. He has been tolerating SSZ ok but has some abdominal discomfort that started few weeks ago. No nausea, vomiting or diarrhea.     REVIEW OF SYSTEMS    General - neg for fever  HEENT - denied dry eyes, dry mouth  Cardiovascular - neg for chest pain  Respiratory - neg for shortness of breath or cough   Gastrointestinal - neg for bloody diarrhea   Genitourinary - neg for blood in urine   Skin - neg for skin rashes  Neuro -neg for stroke or seizures   Hematologic - neg for easy bruising or blood clots   Psych - neg for anxiety or depression   Family planning - has a  baby boy, now 5 weeks old.     Past Medical History:   Diagnosis Date    Hypertension      Past Surgical History:   Procedure Laterality Date    COLONOSCOPY N/A 2022    Procedure: COLONOSCOPY;  Surgeon: John Blank MD;  Location:  GI     Family History   Problem Relation Age of Onset    Alcohol/Drug Father     Cancer Father         pancreatic    Colon Cancer No family hx of      Social History     Socioeconomic History    Marital status:      Spouse name: None    Number of children: None    Years of education: None    Highest education level: None   Tobacco Use    Smoking status: Never    Smokeless tobacco: Never   Vaping Use    Vaping Use: Never used   Substance and Sexual Activity    Alcohol use: Not Currently     Comment: last drink     Drug use: No    Sexual activity: Yes     Social Determinants of Health     Financial  Resource Strain: Low Risk  (9/21/2022)    Overall Financial Resource Strain (CARDIA)     Difficulty of Paying Living Expenses: Not hard at all   Food Insecurity: No Food Insecurity (9/21/2022)    Hunger Vital Sign     Worried About Running Out of Food in the Last Year: Never true     Ran Out of Food in the Last Year: Never true   Transportation Needs: No Transportation Needs (9/21/2022)    PRAPARE - Transportation     Lack of Transportation (Medical): No     Lack of Transportation (Non-Medical): No   Physical Activity: Sufficiently Active (9/21/2022)    Exercise Vital Sign     Days of Exercise per Week: 3 days     Minutes of Exercise per Session: 60 min   Stress: No Stress Concern Present (9/21/2022)    [place] [employer] - Occupational Stress Questionnaire     Feeling of Stress : Not at all   Social Connections: Socially Integrated (9/21/2022)    Social Connection and Isolation Panel [NHANES]     Frequency of Communication with Friends and Family: Twice a week     Frequency of Social Gatherings with Friends and Family: Twice a week     Attends [employer]ism Services: More than 4 times per year     Active Member of Clubs or Organizations: Yes     Marital Status:    Interpersonal Safety: Unknown (11/6/2023)    Interpersonal Safety     Do you feel physically and emotionally safe where you currently live?: Patient refused     Within the past 12 months, have you been hit, slapped, kicked or otherwise physically hurt by someone?: Patient refused     Within the past 12 months, have you been humiliated or emotionally abused in other ways by your partner or ex-partner?: Patient refused   Housing Stability: Low Risk  (9/21/2022)    Housing Stability Vital Sign     Unable to Pay for Housing in the Last Year: No     Number of Places Lived in the Last Year: 1     Unstable Housing in the Last Year: No       No Known Allergies    Current Outpatient Medications   Medication    citalopram (CELEXA) 20 MG  "tablet    losartan-hydrochlorothiazide (HYZAAR) 50-12.5 MG tablet    sulfaSALAzine ER (AZULFIDINE EN) 500 MG EC tablet    citalopram (CELEXA) 20 MG tablet     No current facility-administered medications for this visit.       PHYSICAL EXAM    /86   Pulse 100   Ht 1.778 m (5' 10\")   Wt 103.2 kg (227 lb 8 oz)   SpO2 99%   BMI 32.64 kg/m      General: Alert, No apparent distress and Oriented to person, place, and time  Psych: Affect euthymic  Eyes: Sclera noninjected  Cardiovascular: Regular rate and rhythm, Normal S1 and S2 and No murmurs, rubs or gallops  Respiratory: Clear to auscultation with no wheezing or crackles  Neuro: Normal gait and Able to arise from seated position unassisted  Musculoskeletal: Hands:  Normal. No synovitis.   Wrists:  Normal.  Elbows:  Normal.  Shoulders:  Normal.  Feet:  Normal.  Ankles:  Normal.  Knees:  Normal.    LABS   Reviewed as below.     Nov 2023  Vit D,TSH normal   A1c normal   CCP 12  RF 90  CRP 12.50  CBC normal   ALT 78, AST normal   Creatinine normal  UA neg for blood and protein   SAIRA borderline pos 1;40, speckled  Uric acid normal  G6PD normal   QTB, treponema normal/eng  Hep B surface ab reactive, Hep B core ab and surface ag non reactive     2021  HCV neg   HIV neg     HAND BILATERAL THREE OR MORE VIEWS 11/6/2023 11:50 AM      HISTORY: Pain in both hands.      COMPARISON: None.                                                                       IMPRESSION: Mild thumb CMC joint arthrosis bilaterally. There is  normal bone mineralization and there are no erosions. No evidence of  an inflammatory arthropathy on either side. No definitive acute  fracture on either side. Apparent small linear lucency seen along the  volar base of the right ring finger at the PIP joint is favored to be  projectional, correlation with point tenderness in this area is  advised.      Small 2 mm metallic density in the soft tissues of the left small  finger likely reflects a small " foreign body.     CHEST TWO VIEWS  July 14, 2021 10:49 AM      HISTORY: 50-year-old patient with chest wall pain on the left.                                                                       IMPRESSION: Since February 21, 2012, heart size is normal. No pleural  effusion, pneumothorax, or abnormal area of consolidation.     SHOULDER RIGHT THREE OR MORE VIEWS  8/14/2019 4:35 PM      HISTORY: Acute pain of right shoulder.     COMPARISON: None.                                                                      IMPRESSION: No acute or chronic bony abnormality. Distal acromial  morphology is type II and humeral acromial distance appears adequate.       ASSESSMENT      1. Seropositive rheumatoid arthritis (H)    2. Cyclic citrullinated peptide (CCP) antibody positive    3. Rheumatoid factor positive    4. Positive SAIRA (antinuclear antibody)    5. Long-term use of high-risk medication        (M25.50) Polyarthralgia  (primary encounter diagnosis)  (R76.8) Rheumatoid factor positive  (R76.8) Cyclic citrullinated peptide (CCP) antibody positive  Comment: RF-90, CCP-12. X ray hands - b/l CMC arthrosis.  Initial symptoms included pain and swelling of hands. He also has pain of knees and feet as well without swelling. Morning stiffness lasts for about an hour. Noted mild puffiness of right and left second MCP. It appears he has a mild case of RA. X ray without changes of inflammatory arthropathy. Started on SSZ in Nov 2023. Avoiding methotrexate and leflunomide due to elevated LFT.Will avoid plaquenil as he may likely need more than that to control disease activity and patient also prefers to avoid HCQ after side effects were explained. No synovitis on exam. He continues to have some pain of his fingers though.     Plan:   Continue SSZ, will increase to 1500 mg BID.  If SSZ causes GI upset, we can consider switching to Humira.   Check labs  Orders Placed This Encounter   Procedures    Comprehensive metabolic panel    CRP  inflammation    CBC with platelets differential       No orders of the defined types were placed in this encounter.      (R76.8) Positive SAIRA (antinuclear antibody)  Comment: noted, 1:40, speckled. No clinical features of CTD.   Plan: check additional labs as below.    Long term use of high risk medication   Comment : SSZ  Plan : Patient counseled on the adverse effects of Sulfasalazine including infection, cytopenia, blood disorders or liver dysfunction or damage. Symptoms can include sore throat fever paleness purple spots on your skin, yellowing of your skin or the whites of your eyes; Serious skin disorders. Symptoms can include: flu-like symptoms, painful red or purple rash ,blistering , peeling skin; Kidney damage. Symptoms can include: difficulty urinating, making less urine, or not urinating at all. Patient counseled on the need for regular blood work to monitor . No known h/o G6PD deficiency    RTC - 3 months    I spent 41 minutes on the date of the encounter doing chart review, history and exam, documentation and orders per the note.      RONA GOMEZ MD    Division of Rheumatic & Autoimmune Diseases  [facility]

## 2024-02-01 LAB
ALBUMIN SERPL BCG-MCNC: 4.7 G/DL (ref 3.5–5.2)
ALP SERPL-CCNC: 59 U/L (ref 40–150)
ALT SERPL W P-5'-P-CCNC: 46 U/L (ref 0–70)
ANION GAP SERPL CALCULATED.3IONS-SCNC: 9 MMOL/L (ref 7–15)
AST SERPL W P-5'-P-CCNC: 30 U/L (ref 0–45)
BILIRUB SERPL-MCNC: 0.6 MG/DL
BUN SERPL-MCNC: 13.8 MG/DL (ref 6–20)
CALCIUM SERPL-MCNC: 9.5 MG/DL (ref 8.6–10)
CHLORIDE SERPL-SCNC: 100 MMOL/L (ref 98–107)
CREAT SERPL-MCNC: 1.13 MG/DL (ref 0.67–1.17)
CRP SERPL-MCNC: 3.27 MG/L
DEPRECATED HCO3 PLAS-SCNC: 27 MMOL/L (ref 22–29)
EGFRCR SERPLBLD CKD-EPI 2021: 78 ML/MIN/1.73M2
GLUCOSE SERPL-MCNC: 86 MG/DL (ref 70–99)
POTASSIUM SERPL-SCNC: 4.4 MMOL/L (ref 3.4–5.3)
PROT SERPL-MCNC: 7.2 G/DL (ref 6.4–8.3)
SODIUM SERPL-SCNC: 136 MMOL/L (ref 135–145)

## 2024-02-03 ENCOUNTER — MYC MEDICAL ADVICE (OUTPATIENT)
Dept: RHEUMATOLOGY | Facility: CLINIC | Age: 54
End: 2024-02-03
Payer: COMMERCIAL

## 2024-02-03 DIAGNOSIS — M05.9 SEROPOSITIVE RHEUMATOID ARTHRITIS (H): Primary | ICD-10-CM

## 2024-02-06 RX ORDER — FOLIC ACID 1 MG/1
1 TABLET ORAL DAILY
Qty: 90 TABLET | Refills: 0 | Status: SHIPPED | OUTPATIENT
Start: 2024-02-06 | End: 2024-04-17

## 2024-02-06 RX ORDER — METHOTREXATE 2.5 MG/1
15 TABLET ORAL WEEKLY
Qty: 72 TABLET | Refills: 0 | Status: SHIPPED | OUTPATIENT
Start: 2024-02-06 | End: 2024-04-17

## 2024-02-09 ENCOUNTER — HOSPITAL ENCOUNTER (EMERGENCY)
Facility: CLINIC | Age: 54
Discharge: HOME | End: 2024-02-09
Attending: EMERGENCY MEDICINE | Admitting: EMERGENCY MEDICINE
Payer: COMMERCIAL

## 2024-02-09 ENCOUNTER — APPOINTMENT (OUTPATIENT)
Dept: CT IMAGING | Facility: CLINIC | Age: 54
End: 2024-02-09
Attending: EMERGENCY MEDICINE
Payer: COMMERCIAL

## 2024-02-09 ENCOUNTER — NURSE TRIAGE (OUTPATIENT)
Dept: NURSING | Facility: CLINIC | Age: 54
End: 2024-02-09
Payer: COMMERCIAL

## 2024-02-09 ENCOUNTER — MYC MEDICAL ADVICE (OUTPATIENT)
Dept: PEDIATRICS | Facility: CLINIC | Age: 54
End: 2024-02-09

## 2024-02-09 VITALS
TEMPERATURE: 98.3 F | DIASTOLIC BLOOD PRESSURE: 93 MMHG | BODY MASS INDEX: 31.5 KG/M2 | HEART RATE: 89 BPM | OXYGEN SATURATION: 99 % | SYSTOLIC BLOOD PRESSURE: 148 MMHG | HEIGHT: 71 IN | WEIGHT: 225 LBS | RESPIRATION RATE: 16 BRPM

## 2024-02-09 DIAGNOSIS — N28.89 RIGHT RENAL MASS: Primary | ICD-10-CM

## 2024-02-09 DIAGNOSIS — N28.9 LESION OF RIGHT NATIVE KIDNEY: ICD-10-CM

## 2024-02-09 DIAGNOSIS — R10.11 RUQ ABDOMINAL PAIN: ICD-10-CM

## 2024-02-09 LAB
ALBUMIN SERPL BCG-MCNC: 4.9 G/DL (ref 3.5–5.2)
ALBUMIN UR-MCNC: NEGATIVE MG/DL
ALP SERPL-CCNC: 59 U/L (ref 40–150)
ALT SERPL W P-5'-P-CCNC: 50 U/L (ref 0–70)
ANION GAP SERPL CALCULATED.3IONS-SCNC: 13 MMOL/L (ref 7–15)
APPEARANCE UR: CLEAR
AST SERPL W P-5'-P-CCNC: 27 U/L (ref 0–45)
BASOPHILS # BLD AUTO: 0 10E3/UL (ref 0–0.2)
BASOPHILS NFR BLD AUTO: 1 %
BILIRUB SERPL-MCNC: 0.5 MG/DL
BILIRUB UR QL STRIP: NEGATIVE
BUN SERPL-MCNC: 17.5 MG/DL (ref 6–20)
CALCIUM SERPL-MCNC: 9.6 MG/DL (ref 8.6–10)
CHLORIDE SERPL-SCNC: 97 MMOL/L (ref 98–107)
COLOR UR AUTO: NORMAL
CREAT SERPL-MCNC: 0.95 MG/DL (ref 0.67–1.17)
DEPRECATED HCO3 PLAS-SCNC: 26 MMOL/L (ref 22–29)
EGFRCR SERPLBLD CKD-EPI 2021: >90 ML/MIN/1.73M2
EOSINOPHIL # BLD AUTO: 0.2 10E3/UL (ref 0–0.7)
EOSINOPHIL NFR BLD AUTO: 2 %
ERYTHROCYTE [DISTWIDTH] IN BLOOD BY AUTOMATED COUNT: 13.2 % (ref 10–15)
GLUCOSE SERPL-MCNC: 93 MG/DL (ref 70–99)
GLUCOSE UR STRIP-MCNC: NEGATIVE MG/DL
HCT VFR BLD AUTO: 45.5 % (ref 40–53)
HGB BLD-MCNC: 15.5 G/DL (ref 13.3–17.7)
HGB UR QL STRIP: NEGATIVE
HOLD SPECIMEN: NORMAL
HOLD SPECIMEN: NORMAL
IMM GRANULOCYTES # BLD: 0 10E3/UL
IMM GRANULOCYTES NFR BLD: 0 %
KETONES UR STRIP-MCNC: NEGATIVE MG/DL
LEUKOCYTE ESTERASE UR QL STRIP: NEGATIVE
LIPASE SERPL-CCNC: 37 U/L (ref 13–60)
LYMPHOCYTES # BLD AUTO: 2.1 10E3/UL (ref 0.8–5.3)
LYMPHOCYTES NFR BLD AUTO: 28 %
MCH RBC QN AUTO: 30.3 PG (ref 26.5–33)
MCHC RBC AUTO-ENTMCNC: 34.1 G/DL (ref 31.5–36.5)
MCV RBC AUTO: 89 FL (ref 78–100)
MONOCYTES # BLD AUTO: 0.6 10E3/UL (ref 0–1.3)
MONOCYTES NFR BLD AUTO: 8 %
NEUTROPHILS # BLD AUTO: 4.5 10E3/UL (ref 1.6–8.3)
NEUTROPHILS NFR BLD AUTO: 61 %
NITRATE UR QL: NEGATIVE
NRBC # BLD AUTO: 0 10E3/UL
NRBC BLD AUTO-RTO: 0 /100
PH UR STRIP: 6 [PH] (ref 5–7)
PLATELET # BLD AUTO: 246 10E3/UL (ref 150–450)
POTASSIUM SERPL-SCNC: 4 MMOL/L (ref 3.4–5.3)
PROT SERPL-MCNC: 7.9 G/DL (ref 6.4–8.3)
RBC # BLD AUTO: 5.11 10E6/UL (ref 4.4–5.9)
RBC URINE: <1 /HPF
SODIUM SERPL-SCNC: 136 MMOL/L (ref 135–145)
SP GR UR STRIP: 1.02 (ref 1–1.03)
SQUAMOUS EPITHELIAL: <1 /HPF
UROBILINOGEN UR STRIP-MCNC: NORMAL MG/DL
WBC # BLD AUTO: 7.4 10E3/UL (ref 4–11)
WBC URINE: <1 /HPF

## 2024-02-09 PROCEDURE — 81001 URINALYSIS AUTO W/SCOPE: CPT | Performed by: EMERGENCY MEDICINE

## 2024-02-09 PROCEDURE — 83690 ASSAY OF LIPASE: CPT | Performed by: EMERGENCY MEDICINE

## 2024-02-09 PROCEDURE — 99284 EMERGENCY DEPT VISIT MOD MDM: CPT | Mod: 25

## 2024-02-09 PROCEDURE — 80053 COMPREHEN METABOLIC PANEL: CPT | Performed by: EMERGENCY MEDICINE

## 2024-02-09 PROCEDURE — 36415 COLL VENOUS BLD VENIPUNCTURE: CPT | Performed by: EMERGENCY MEDICINE

## 2024-02-09 PROCEDURE — 85025 COMPLETE CBC W/AUTO DIFF WBC: CPT | Performed by: EMERGENCY MEDICINE

## 2024-02-09 PROCEDURE — 74176 CT ABD & PELVIS W/O CONTRAST: CPT

## 2024-02-09 ASSESSMENT — ACTIVITIES OF DAILY LIVING (ADL): ADLS_ACUITY_SCORE: 35

## 2024-02-09 NOTE — TELEPHONE ENCOUNTER
"Nurse Triage SBAR    Is this a 2nd Level Triage? NO    Situation: Upper Abdominal Pain    Background: :na    Assessment: Patient calling to report constant right upper quadrant pain for past 2 weeks. He reports pain gets up to 4/10, denies increase in pain with fatty meal. He reports decrease in his urine output as well, \"usually I pee like a horse\", he reports that he has voided within past 12 hours, denies feeling like bladder is full or not emptying. He denies urgency, pain with voiding, or incontinence. He reports drinking less than usual, drank liter of water today and 3 cups of coffee. He denies fever, unusual sweating, or difficulty breathing.    Protocol Recommended Disposition:   According to the protocol, patient should go to ED now.  Care advice given.     CALL  IF: * Confusion occurs * Passes out or becomes too weak to stand * Severe difficulty breathing occurs.     Patient verbalizes understanding and agrees with plan of care. Plans to go to [facility] ED. Routing note as FYI to PCP per patient request.    Claire Francisco RN  02/09/24 6:10 PM  [facility] Nurse Advisor    Reason for Disposition   [1] Pain lasts > 10 minutes AND [2] age > 50    Additional Information   Negative: SEVERE difficulty breathing (e.g., struggling for each breath, speaks in single words)   Negative: Shock suspected (e.g., cold/pale/clammy skin, too weak to stand, low BP, rapid pulse)   Negative: Difficult to awaken or acting confused (e.g., disoriented, slurred speech)   Negative: Passed out (i.e., lost consciousness, collapsed and was not responding)   Negative: Visible sweat on face or sweat dripping down face   Negative: Sounds like a life-threatening emergency to the triager   Negative: Followed an abdomen (stomach) injury   Negative: Chest pain   Negative: [1] Abdominal pain AND [2] pregnant < 20 weeks   Negative: [1] Abdominal pain AND [2] pregnant 20 or more weeks   Negative: [1] Abdominal pain AND [2] " postpartum (from 0 to 6 weeks after delivery)   Negative: Abdomen bloating or swelling are main symptoms   Negative: [1] SEVERE pain (e.g., excruciating) AND [2] present > 1 hour    Protocols used: Abdominal Pain - Upper-A-AH

## 2024-02-10 NOTE — ED TRIAGE NOTES
Right upper abdominal pain  for the past 3 weeks. Just diagnosed with RA. Was hoping that pain was r/t meds he was taking for that.     Denies that pain is associated with eating or anything else. Denies fevers, vomiting or diarrhea. Has had nausea but again feels this is associated with RA meds.

## 2024-02-10 NOTE — ED PROVIDER NOTES
"  History     Chief Complaint:  Abdominal Pain       The history is provided by the patient.      Geovanni Galvez is a 53 year old male with history of rheumatoid arthritis who presents to the ED for evaluation of abdominal pain. Patient states he developed aching, waxing and waning RUQ pain that has persisted for 3 weeks. His pain intermittently radiates to his RLQ. He mentions nausea. Patient denies dysuria, back pain, vomiting, diarrhea, fever, chills, chest pain, or shortness of breath. Patient notes history of rheumatoid arthritis that was diagnosed a few months ago and he started sulfasalazine at that time. He complained of this abdominal pain to his doctor, discontinued sulfasalazine, and started methotrexate a few days ago. Patient also recently had a baby.    Independent Historian:   None - Patient Only    Review of External Notes:   Patient was diagnosed with rheumatoid arthritis 2-3 months ago and started sulfasalazine. He developed RUQ abdominal pain about 3 weeks ago and discontinued sulfasalazine and started methotrexate. Patient had an ultrasound of his liver on 11-.     US ABDOMEN LIMITED 11/30/2023 8:45 AM   IMPRESSION:  1.  Unremarkable right upper quadrant ultrasound.      Medications:    Citalopram  Losartan-hydrochlorothiazide  Methotrexate    Past Medical History:    Hypertension  Prediabetes  Hyperlipidemia  ISH  Rheumatoid arthritis  Bilateral chronic knee pain  Obesity    Past Surgical History:    Colonoscopy    Physical Exam   Patient Vitals for the past 24 hrs:   BP Temp Temp src Pulse Resp SpO2 Height Weight   02/09/24 1903 (!) 148/93 98.3  F (36.8  C) Temporal 89 16 99 % 1.803 m (5' 11\") 102.1 kg (225 lb)        Physical Exam  Nursing note and vitals reviewed.  Constitutional:  Appears well-developed and well-nourished.   HENT:   Head:    Atraumatic.   Mouth/Throat:   Oropharynx is clear and moist. No oropharyngeal exudate.   Eyes:    Pupils are equal, round, and reactive to " light.   Neck:    Normal range of motion. Neck supple.      No tracheal deviation present. No thyromegaly present.   Cardiovascular:  Normal rate, regular rhythm, no murmur   Pulmonary/Chest: Breath sounds are clear and equal without wheezes or crackles.  Abdominal:   Soft. Bowel sounds are normal. Exhibits no distension and      no mass. There is no tenderness.      There is no rebound and no guarding.   Back exam:                 No CVA tenderness  Musculoskeletal:  Exhibits no edema.   Lymphadenopathy:  No cervical adenopathy.   Neurological:   Alert and oriented to person, place, and time.   Skin:    Skin is warm and dry. No rash noted. No pallor.      Emergency Department Course     Imaging:  CT Abdomen Pelvis w/o Contrast   Final Result   IMPRESSION:    1.  No acute abnormality in the abdomen or pelvis.   2.  Incidental 1.5 cm nodule in the right kidney, which may represent a hyperdense cyst although a solid lesion is not excluded. Follow-up routine renal ultrasound recommended for further characterization.              Laboratory:  Labs Ordered and Resulted from Time of ED Arrival to Time of ED Departure   COMPREHENSIVE METABOLIC PANEL - Abnormal       Result Value    Sodium 136      Potassium 4.0      Carbon Dioxide (CO2) 26      Anion Gap 13      Urea Nitrogen 17.5      Creatinine 0.95      GFR Estimate >90      Calcium 9.6      Chloride 97 (*)     Glucose 93      Alkaline Phosphatase 59      AST 27      ALT 50      Protein Total 7.9      Albumin 4.9      Bilirubin Total 0.5     LIPASE - Normal    Lipase 37     ROUTINE UA WITH MICROSCOPIC REFLEX TO CULTURE - Normal    Color Urine Light Yellow      Appearance Urine Clear      Glucose Urine Negative      Bilirubin Urine Negative      Ketones Urine Negative      Specific Gravity Urine 1.017      Blood Urine Negative      pH Urine 6.0      Protein Albumin Urine Negative      Urobilinogen Urine Normal      Nitrite Urine Negative      Leukocyte Esterase Urine  Negative      RBC Urine <1      WBC Urine <1      Squamous Epithelials Urine <1     CBC WITH PLATELETS AND DIFFERENTIAL    WBC Count 7.4      RBC Count 5.11      Hemoglobin 15.5      Hematocrit 45.5      MCV 89      MCH 30.3      MCHC 34.1      RDW 13.2      Platelet Count 246      % Neutrophils 61      % Lymphocytes 28      % Monocytes 8      % Eosinophils 2      % Basophils 1      % Immature Granulocytes 0      NRBCs per 100 WBC 0      Absolute Neutrophils 4.5      Absolute Lymphocytes 2.1      Absolute Monocytes 0.6      Absolute Eosinophils 0.2      Absolute Basophils 0.0      Absolute Immature Granulocytes 0.0      Absolute NRBCs 0.0          Procedures   None    Emergency Department Course & Assessments:    Interventions:  Medications - No data to display     Independent Interpretation (X-rays, CTs, rhythm strip):  None    Consultations/Discussion of Management or Tests:  ED Course as of 02/09/24 2228 Fri Feb 09, 2024 2023 I obtained history and examined the patient as above.    I rechecked him and discussed the ultrasound findings with him including the incidental finding of the nodule of his kidney.  He was instructed to discuss this with his doctor and have his doctor perform a renal ultrasound for further evaluation of this to rule out malignancy which I discussed with him.    Social Determinants of Health affecting care:   None    Disposition:  The patient was discharged.     Impression & Plan    CMS Diagnoses: None    Medical Decision Making:  I found this patient to have right upper quadrant abdominal pain of unclear etiology.  I considered the possibly of medication reaction to his rheumatoid arthritis medication so he was told to call his doctor to schedule follow-up appointment for this week.  There was no sign of hepatitis or liver enzyme elevation.  There was no sign of gallstones or cholecystitis.  No sign of liver lesion.  No sign of ureteral calculus or renal obstruction.  No sign of renal  failure.  No sign of urinary tract infection or pyelonephritis.  No sign of pancreatitis.  No rash to suggest shingles.  I discussed with him that he needs to have his primary care doctor follow-up on the kidney nodule and he was told to have his primary care doctor schedule renal ultrasound to further characterize this lesion and rule out malignancy.  He voices understanding.  I discussed with him that the exact cause of his symptoms is unclear so he needs close follow-up with his primary care doctor in clinic.  He was told signs and symptoms to return for including worsening pain, fevers, chills, vomiting or other concerning symptoms.    Diagnosis:    ICD-10-CM    1. RUQ abdominal pain  R10.11       2. Lesion of right native kidney  N28.9            Discharge Medications:  Discharge Medication List as of 2/9/2024  9:43 PM           Scribe Disclosure:  Sarah GALICIA Hailie, am serving as a scribe at 10:28 PM on 2/9/2024 to document services personally performed by Krysten Richards MD based on my observations and the provider's statements to me.     Melodie GALICIA, am serving as a scribe at 8:41 PM on 2/9/2024 to document services personally performed by Krysten Richards MD based on my observations and the provider's statements to me.     2/9/2024   Krysten Richards MD [place], Cheri Lee, MD  02/10/24 0428

## 2024-02-22 ENCOUNTER — MYC REFILL (OUTPATIENT)
Dept: PEDIATRICS | Facility: CLINIC | Age: 54
End: 2024-02-22
Payer: COMMERCIAL

## 2024-02-22 DIAGNOSIS — I10 ESSENTIAL HYPERTENSION WITH GOAL BLOOD PRESSURE LESS THAN 140/90: ICD-10-CM

## 2024-02-22 RX ORDER — LOSARTAN POTASSIUM AND HYDROCHLOROTHIAZIDE 12.5; 5 MG/1; MG/1
1 TABLET ORAL DAILY
Qty: 90 TABLET | Refills: 3 | OUTPATIENT
Start: 2024-02-22

## 2024-02-23 ENCOUNTER — HOSPITAL ENCOUNTER (OUTPATIENT)
Dept: ULTRASOUND IMAGING | Facility: CLINIC | Age: 54
Discharge: HOME | End: 2024-02-23
Attending: INTERNAL MEDICINE | Admitting: INTERNAL MEDICINE
Payer: COMMERCIAL

## 2024-02-23 ENCOUNTER — MYC MEDICAL ADVICE (OUTPATIENT)
Dept: PEDIATRICS | Facility: CLINIC | Age: 54
End: 2024-02-23
Payer: COMMERCIAL

## 2024-02-23 DIAGNOSIS — N28.89 RIGHT RENAL MASS: Primary | ICD-10-CM

## 2024-02-23 DIAGNOSIS — N28.89 RIGHT RENAL MASS: ICD-10-CM

## 2024-02-23 PROCEDURE — 76775 US EXAM ABDO BACK WALL LIM: CPT

## 2024-03-04 ENCOUNTER — TELEPHONE (OUTPATIENT)
Dept: PEDIATRICS | Facility: CLINIC | Age: 54
End: 2024-03-04
Payer: COMMERCIAL

## 2024-03-04 ENCOUNTER — MYC MEDICAL ADVICE (OUTPATIENT)
Dept: PEDIATRICS | Facility: CLINIC | Age: 54
End: 2024-03-04
Payer: COMMERCIAL

## 2024-03-04 ENCOUNTER — ANCILLARY PROCEDURE (OUTPATIENT)
Dept: MRI IMAGING | Facility: CLINIC | Age: 54
End: 2024-03-04
Attending: INTERNAL MEDICINE
Payer: COMMERCIAL

## 2024-03-04 DIAGNOSIS — N28.89 RIGHT RENAL MASS: Primary | ICD-10-CM

## 2024-03-04 DIAGNOSIS — N28.89 RIGHT RENAL MASS: ICD-10-CM

## 2024-03-04 PROCEDURE — 255N000002 HC RX 255 OP 636: Performed by: INTERNAL MEDICINE

## 2024-03-04 PROCEDURE — A9585 GADOBUTROL INJECTION: HCPCS | Performed by: INTERNAL MEDICINE

## 2024-03-04 PROCEDURE — 74183 MRI ABD W/O CNTR FLWD CNTR: CPT

## 2024-03-04 RX ORDER — GADOBUTROL 604.72 MG/ML
10 INJECTION INTRAVENOUS ONCE
Status: COMPLETED | OUTPATIENT
Start: 2024-03-04 | End: 2024-03-04

## 2024-03-04 RX ADMIN — GADOBUTROL 10 ML: 604.72 INJECTION INTRAVENOUS at 12:39

## 2024-03-05 ENCOUNTER — PRE VISIT (OUTPATIENT)
Dept: UROLOGY | Facility: CLINIC | Age: 54
End: 2024-03-05
Payer: COMMERCIAL

## 2024-03-05 ENCOUNTER — PATIENT OUTREACH (OUTPATIENT)
Dept: UROLOGY | Facility: CLINIC | Age: 54
End: 2024-03-05
Payer: COMMERCIAL

## 2024-03-05 NOTE — TELEPHONE ENCOUNTER
MEDICAL RECORDS REQUEST   [facility]  Urology Clinic  [address] [place] [address]  PHONE: [phone]  Fax: [phone]        FUTURE VISIT INFORMATION                                                   Geovanni Galvez, : 1970 scheduled for future visit at [facility]    APPOINTMENT INFORMATION:  Date: 2024  Provider:  Bossman Glaser MD  Reason for Visit/Diagnosis: Right renal mass    REFERRAL INFORMATION:  Referring provider:  John Puri MD in [facility]      RECORDS REQUESTED FOR VISIT                                                     NOTES  STATUS/DETAILS   OFFICE NOTE from referring provider  2024 -- John Puri MD in [facility]   DISCHARGE REPORT from the ER  YES, 2024 -- [facility] ED   MEDICATION LIST  yes   LABS     URINALYSIS (UA)  yes   IMAGES  yes, 2024 -- MR RENAL  2024 -- US KIDNEY  2024 -- CT ABD PELVIS  2023 -- US ABD  2023 -- XR CHEST       PRE-VISIT CHECKLIST      Joint diagnostic appointment coordinated correctly          (ensure right order & amount of time) Yes   RECORD COLLECTION COMPLETE Yes

## 2024-03-05 NOTE — TELEPHONE ENCOUNTER
Writer reached out to pt per direct referral for Dr Glaser.     Pt scheduled for VV on 3/6. Pt agreeable to date and time.     Will continue to follow as needed.

## 2024-03-05 NOTE — CONFIDENTIAL NOTE
Reason for visit: consult     Relevant information: renal mass    Records/imaging/labs/orders: in epic    At Rooming: sanju Hong  3/5/2024  12:41 PM

## 2024-03-06 ENCOUNTER — PRE VISIT (OUTPATIENT)
Dept: UROLOGY | Facility: CLINIC | Age: 54
End: 2024-03-06

## 2024-03-06 ENCOUNTER — TELEPHONE (OUTPATIENT)
Dept: PEDIATRICS | Facility: CLINIC | Age: 54
End: 2024-03-06

## 2024-03-06 ENCOUNTER — VIRTUAL VISIT (OUTPATIENT)
Dept: UROLOGY | Facility: CLINIC | Age: 54
End: 2024-03-06
Payer: COMMERCIAL

## 2024-03-06 VITALS — WEIGHT: 220 LBS | HEIGHT: 71 IN | BODY MASS INDEX: 30.8 KG/M2

## 2024-03-06 DIAGNOSIS — N28.89 RIGHT RENAL MASS: ICD-10-CM

## 2024-03-06 PROCEDURE — 99244 OFF/OP CNSLTJ NEW/EST MOD 40: CPT | Mod: 95 | Performed by: UROLOGY

## 2024-03-06 RX ORDER — CEFAZOLIN SODIUM 2 G/50ML
2 SOLUTION INTRAVENOUS SEE ADMIN INSTRUCTIONS
Status: CANCELLED | OUTPATIENT
Start: 2024-03-06

## 2024-03-06 RX ORDER — CEFAZOLIN SODIUM 2 G/50ML
2 SOLUTION INTRAVENOUS
Status: CANCELLED | OUTPATIENT
Start: 2024-03-06

## 2024-03-06 ASSESSMENT — PAIN SCALES - GENERAL: PAINLEVEL: NO PAIN (0)

## 2024-03-06 NOTE — LETTER
3/6/2024       RE: Geovanni Galvez  [address] [place] [address]     Dear Colleague,    Thank you for referring your patient, Geovanni Galvez, to the [facility] [place] at [facility]. Please see a copy of my visit note below.    Virtual Visit Details    Type of service:  Video Visit     Originating Location (pt. Location): Home    Distant Location (provider location):  On-site  Platform used for Video Visit: [facility]      Urology Oncology Clinic   Renal mass             Chief Complaint:   Cystic Renal Mass            Consult or Referral:     Geovanni Galvez is a 53 year old male seen in consultation.         History of Present Illness:    Geovanni Galvez is a very pleasant 53 year old male who recently underwent cross-sectional imaging for right upper quadrant pain which revealed complex cystic renal lesion.  MR renal mass was then ordered which confirmed the findings.  The cyst is about 3 cm and is located on the posterior lateral surface of the right kidney.    he does not have a history of previous abdominal or retroperitoneal surgery.  There is not a family history of renal cell cancer.  The patient does not have a history of smoking and currently is not smoking.    ECOG Performance Score: 0 - Independent           Past Medical History:     Past Medical History:   Diagnosis Date    Hypertension             Past Surgical History:     Past Surgical History:   Procedure Laterality Date    COLONOSCOPY N/A 11/17/2022    Procedure: COLONOSCOPY;  Surgeon: John Blank MD;  Location:  GI            Problem List:     Patient Active Problem List    Diagnosis Date Noted    Prediabetes 07/14/2013     Priority: High    Rheumatoid arthritis involving multiple sites, unspecified whether rheumatoid factor present (H) 11/28/2023     Priority: Medium    Bilateral chronic knee pain 10/10/2023     Priority: Medium    ISH  (generalized anxiety disorder) 02/06/2018     Priority: Medium    Essential hypertension with goal blood pressure less than 140/90 07/12/2016     Priority: Medium    Hyperlipidemia with target LDL less than 130 03/06/2014     Priority: Medium     Diagnosis updated by automated process. Provider to review and confirm.      Obesity 07/24/2013     Priority: Medium     Problem list name updated by automated process. Provider to review              Medications     Current Outpatient Medications   Medication    citalopram (CELEXA) 20 MG tablet    folic acid (FOLVITE) 1 MG tablet    losartan-hydrochlorothiazide (HYZAAR) 50-12.5 MG tablet    methotrexate 2.5 MG tablet    citalopram (CELEXA) 20 MG tablet     No current facility-administered medications for this visit.            Family History:     Family History   Problem Relation Age of Onset    Alcohol/Drug Father     Cancer Father         pancreatic    Colon Cancer No family hx of             Social History:     Social History     Socioeconomic History    Marital status:      Spouse name: Not on file    Number of children: Not on file    Years of education: Not on file    Highest education level: Not on file   Occupational History    Not on file   Tobacco Use    Smoking status: Never     Passive exposure: Never    Smokeless tobacco: Never   Vaping Use    Vaping Use: Never used   Substance and Sexual Activity    Alcohol use: Not Currently     Comment: last drink 2014    Drug use: No    Sexual activity: Yes   Other Topics Concern    Parent/sibling w/ CABG, MI or angioplasty before 65F 55M? Not Asked   Social History Narrative    Not on file     Social Determinants of Health     Financial Resource Strain: Low Risk  (9/21/2022)    Overall Financial Resource Strain (CARDIA)     Difficulty of Paying Living Expenses: Not hard at all   Food Insecurity: No Food Insecurity (9/21/2022)    Hunger Vital Sign     Worried About Running Out of Food in the Last Year: Never true      Ran Out of Food in the Last Year: Never true   Transportation Needs: No Transportation Needs (9/21/2022)    PRAPARE - Transportation     Lack of Transportation (Medical): No     Lack of Transportation (Non-Medical): No   Physical Activity: Sufficiently Active (9/21/2022)    Exercise Vital Sign     Days of Exercise per Week: 3 days     Minutes of Exercise per Session: 60 min   Stress: No Stress Concern Present (9/21/2022)    [employer] - Occupational Stress Questionnaire     Feeling of Stress : Not at all   Social Connections: Socially Integrated (9/21/2022)    Social Connection and Isolation Panel [NHANES]     Frequency of Communication with Friends and Family: Twice a week     Frequency of Social Gatherings with Friends and Family: Twice a week     Attends [employer] Services: More than 4 times per year     Active Member of Clubs or Organizations: Yes     Attends Club or Organization Meetings: Not on file     Marital Status:    Interpersonal Safety: Unknown (11/6/2023)    Interpersonal Safety     Do you feel physically and emotionally safe where you currently live?: Patient refused     Within the past 12 months, have you been hit, slapped, kicked or otherwise physically hurt by someone?: Patient refused     Within the past 12 months, have you been humiliated or emotionally abused in other ways by your partner or ex-partner?: Patient refused   Housing Stability: Low Risk  (9/21/2022)    Housing Stability Vital Sign     Unable to Pay for Housing in the Last Year: No     Number of Places Lived in the Last Year: 1     Unstable Housing in the Last Year: No            Allergies:   Patient has no known allergies.         Review of Systems:  From intake questionnaire     Negative 14 system review except as noted on HPI, nurse's note see below.         Physical Exam:     Vitals:  No vitals were obtained today due to virtual visit.    Physical Exam   EYES: Eyes grossly normal to  "inspection.  No discharge or erythema, or obvious scleral/conjunctival abnormalities.  SKIN: Visible skin clear. No significant rash, abnormal pigmentation or lesions.  NEURO: Cranial nerves grossly intact.  Mentation and speech appropriate for age.  GENERAL: Healthy, alert and no distress  RESP: No audible wheeze, cough, or visible cyanosis.  No visible retractions or increased work of breathing.    PSYCH: Mentation appears normal, affect normal/bright, judgement and insight intact, normal speech and appearance well-groomed.          Labs and Pathology:    I personally reviewed all applicable laboratory and pathology data reviewed with patient      Lab Results   Component Value Date    WBC 7.4 02/09/2024    WBC 9.5 02/21/2012     Lab Results   Component Value Date    RBC 5.11 02/09/2024    RBC 5.43 02/21/2012     Lab Results   Component Value Date    HGB 15.5 02/09/2024    HGB 17.1 02/21/2012     Lab Results   Component Value Date    HCT 45.5 02/09/2024    HCT 48.6 02/21/2012     No components found for: \"MCT\"  Lab Results   Component Value Date    MCV 89 02/09/2024    MCV 90 02/21/2012     Lab Results   Component Value Date    MCH 30.3 02/09/2024    MCH 31.5 02/21/2012     Lab Results   Component Value Date    MCHC 34.1 02/09/2024    MCHC 35.2 02/21/2012     Lab Results   Component Value Date    RDW 13.2 02/09/2024    RDW 13.4 02/21/2012     Lab Results   Component Value Date     02/09/2024     02/21/2012       Last Comprehensive Metabolic Panel:  Sodium   Date Value Ref Range Status   02/09/2024 136 135 - 145 mmol/L Final     Comment:     Reference intervals for this test were updated on 09/26/2023 to more accurately reflect our healthy population. There may be differences in the flagging of prior results with similar values performed with this method. Interpretation of those prior results can be made in the context of the updated reference intervals.    05/14/2019 139 133 - 144 mmol/L Final "     Potassium   Date Value Ref Range Status   02/09/2024 4.0 3.4 - 5.3 mmol/L Final   09/23/2022 4.6 3.4 - 5.3 mmol/L Final   05/14/2019 4.6 3.4 - 5.3 mmol/L Final     Chloride   Date Value Ref Range Status   02/09/2024 97 (L) 98 - 107 mmol/L Final   09/23/2022 105 94 - 109 mmol/L Final   05/14/2019 104 94 - 109 mmol/L Final     Carbon Dioxide   Date Value Ref Range Status   05/14/2019 28 20 - 32 mmol/L Final     Carbon Dioxide (CO2)   Date Value Ref Range Status   02/09/2024 26 22 - 29 mmol/L Final   09/23/2022 29 20 - 32 mmol/L Final     Anion Gap   Date Value Ref Range Status   02/09/2024 13 7 - 15 mmol/L Final   09/23/2022 5 3 - 14 mmol/L Final   05/14/2019 7 3 - 14 mmol/L Final     Glucose   Date Value Ref Range Status   02/09/2024 93 70 - 99 mg/dL Final   09/23/2022 118 (H) 70 - 99 mg/dL Final   05/14/2019 101 (H) 70 - 99 mg/dL Final     Comment:     Fasting specimen     Urea Nitrogen   Date Value Ref Range Status   02/09/2024 17.5 6.0 - 20.0 mg/dL Final   09/23/2022 17 7 - 30 mg/dL Final   05/14/2019 17 7 - 30 mg/dL Final     Creatinine   Date Value Ref Range Status   02/09/2024 0.95 0.67 - 1.17 mg/dL Final   05/14/2019 1.04 0.66 - 1.25 mg/dL Final     GFR Estimate   Date Value Ref Range Status   02/09/2024 >90 >60 mL/min/1.73m2 Final   05/14/2019 85 >60 mL/min/[1.73_m2] Final     Comment:     Non  GFR Calc  Starting 12/18/2018, serum creatinine based estimated GFR (eGFR) will be   calculated using the Chronic Kidney Disease Epidemiology Collaboration   (CKD-EPI) equation.       Calcium   Date Value Ref Range Status   02/09/2024 9.6 8.6 - 10.0 mg/dL Final   05/14/2019 8.9 8.5 - 10.1 mg/dL Final     Bilirubin Total   Date Value Ref Range Status   02/09/2024 0.5 <=1.2 mg/dL Final   05/14/2019 0.9 0.2 - 1.3 mg/dL Final     Alkaline Phosphatase   Date Value Ref Range Status   02/09/2024 59 40 - 150 U/L Final     Comment:     Reference intervals for this test were updated on 11/14/2023 to more  "accurately reflect our healthy population. There may be differences in the flagging of prior results with similar values performed with this method. Interpretation of those prior results can be made in the context of the updated reference intervals.   05/14/2019 73 40 - 150 U/L Final     ALT   Date Value Ref Range Status   02/09/2024 50 0 - 70 U/L Final     Comment:     Reference intervals for this test were updated on 6/12/2023 to more accurately reflect our healthy population. There may be differences in the flagging of prior results with similar values performed with this method. Interpretation of those prior results can be made in the context of the updated reference intervals.     05/14/2019 53 0 - 70 U/L Final     AST   Date Value Ref Range Status   02/09/2024 27 0 - 45 U/L Final     Comment:     Reference intervals for this test were updated on 6/12/2023 to more accurately reflect our healthy population. There may be differences in the flagging of prior results with similar values performed with this method. Interpretation of those prior results can be made in the context of the updated reference intervals.   05/14/2019 27 0 - 45 U/L Final         INR   Date Value Ref Range Status   02/21/2012 1.03 0.86 - 1.14 Final        No components found for: \"URINE\"              Imaging:    I personally viewed all applicable imaging and interpreted them and went over the results with the patient.  Mass/lesion details are as follows    The mass/lesion is located in the Right side and is primarily located in the inter polar region.  The tumor is also 50 % endophytic and exophytic.  The mass/lesion primarily lies on the lateral surface.  With regard to the collecting system, the edge of the tumor arrives between 4-7 mm from the collecting system.             Assessment and Plan:     Assessment:  53-year-old male with complex right cystic renal lesion concerning for renal cell carcinoma      We had a long conversation " regarding the management of this lesion.  He is not comfortable with the idea of active surveillance.  We discussed ablation as an option but would carry potential risk of tumor spreading outside the kidney given the cystic nature of the lesion.  We then discussed robotic partial nephrectomy as an option.  The procedure itself and periprocedural phase were carefully reviewed.  The risks of the procedure including but not limited to infection, bleeding, damage to surrounding structures, and urine leak and need for radical nephrectomy in case of emergency were discussed.  All his questions were answered to satisfaction.            Plan:  Scheduled for right robotic partial possible radical nephrectomy  Needs PAC visit      45 total minutes spent on the date of the encounter including direct interaction with the patient, performing chart review, documentation and further activities as noted above      Bossman Glaser MD   Department of Urology   [facility]

## 2024-03-06 NOTE — TELEPHONE ENCOUNTER
Order/Referral Request    Who is requesting: Pt    Orders being requested: Pt is requesting that urology order be faxed to the [facility] @ [phone] today so that he can then pranay the appt    Reason service is needed/diagnosis: see above    When are orders needed by: today    Has this been discussed with Provider:     Does patient have a preference on a Group/Provider/Facility? [facility]    Does patient have an appointment scheduled?: No    Where to send orders: Fax # above    Could we send this information to you in [employer] or would you prefer to receive a phone call?:   Patient would like to be contacted via [employer]

## 2024-03-06 NOTE — NURSING NOTE
Is the patient currently in the state of [place]? YES    Visit mode:VIDEO    If the visit is dropped, the patient can be reconnected by: VIDEO VISIT: Text to cell phone:   Telephone Information:   Mobile [phone]       Will anyone else be joining the visit? NO  (If patient encounters technical issues they should call [phone]    How would you like to obtain your AVS? MyChart    Are changes needed to the allergy or medication list? No    Reason for visit: Consult    Shazia RODRIGUEZ

## 2024-03-06 NOTE — TELEPHONE ENCOUNTER
Order/Referral Request    Who is requesting: Pt    Orders being requested: urology order in epic to be faxed to [facility] at [phone]    Reason service is needed/diagnosis: NA    When are orders needed by: today    Has this been discussed with Provider: No    Does patient have a preference on a Group/Provider/Facility? [facility]    Does patient have an appointment scheduled?: No    Where to send orders: Fax    Could we send this information to you in [employer] or would you prefer to receive a phone call?:   Patient would like to be contacted via [employer]

## 2024-03-06 NOTE — PROGRESS NOTES
Virtual Visit Details    Type of service:  Video Visit     Originating Location (pt. Location): Home    Distant Location (provider location):  On-site  Platform used for Video Visit: [facility]      Urology Oncology Clinic   Renal mass             Chief Complaint:   Cystic Renal Mass            Consult or Referral:     Geovanni Galvez is a 53 year old male seen in consultation.         History of Present Illness:    Geovanni Galvez is a very pleasant 53 year old male who recently underwent cross-sectional imaging for right upper quadrant pain which revealed complex cystic renal lesion.  MR renal mass was then ordered which confirmed the findings.  The cyst is about 3 cm and is located on the posterior lateral surface of the right kidney.    he does not have a history of previous abdominal or retroperitoneal surgery.  There is not a family history of renal cell cancer.  The patient does not have a history of smoking and currently is not smoking.    ECOG Performance Score: 0 - Independent           Past Medical History:     Past Medical History:   Diagnosis Date    Hypertension             Past Surgical History:     Past Surgical History:   Procedure Laterality Date    COLONOSCOPY N/A 11/17/2022    Procedure: COLONOSCOPY;  Surgeon: John Blank MD;  Location:  GI            Problem List:     Patient Active Problem List    Diagnosis Date Noted    Prediabetes 07/14/2013     Priority: High    Rheumatoid arthritis involving multiple sites, unspecified whether rheumatoid factor present (H) 11/28/2023     Priority: Medium    Bilateral chronic knee pain 10/10/2023     Priority: Medium    ISH (generalized anxiety disorder) 02/06/2018     Priority: Medium    Essential hypertension with goal blood pressure less than 140/90 07/12/2016     Priority: Medium    Hyperlipidemia with target LDL less than 130 03/06/2014     Priority: Medium     Diagnosis updated by automated process. Provider to review and confirm.       Obesity 07/24/2013     Priority: Medium     Problem list name updated by automated process. Provider to review              Medications     Current Outpatient Medications   Medication    citalopram (CELEXA) 20 MG tablet    folic acid (FOLVITE) 1 MG tablet    losartan-hydrochlorothiazide (HYZAAR) 50-12.5 MG tablet    methotrexate 2.5 MG tablet    citalopram (CELEXA) 20 MG tablet     No current facility-administered medications for this visit.            Family History:     Family History   Problem Relation Age of Onset    Alcohol/Drug Father     Cancer Father         pancreatic    Colon Cancer No family hx of             Social History:     Social History     Socioeconomic History    Marital status:      Spouse name: Not on file    Number of children: Not on file    Years of education: Not on file    Highest education level: Not on file   Occupational History    Not on file   Tobacco Use    Smoking status: Never     Passive exposure: Never    Smokeless tobacco: Never   Vaping Use    Vaping Use: Never used   Substance and Sexual Activity    Alcohol use: Not Currently     Comment: last drink 2014    Drug use: No    Sexual activity: Yes   Other Topics Concern    Parent/sibling w/ CABG, MI or angioplasty before 65F 55M? Not Asked   Social History Narrative    Not on file     Social Determinants of Health     Financial Resource Strain: Low Risk  (9/21/2022)    Overall Financial Resource Strain (CARDIA)     Difficulty of Paying Living Expenses: Not hard at all   Food Insecurity: No Food Insecurity (9/21/2022)    Hunger Vital Sign     Worried About Running Out of Food in the Last Year: Never true     Ran Out of Food in the Last Year: Never true   Transportation Needs: No Transportation Needs (9/21/2022)    PRAPARE - Transportation     Lack of Transportation (Medical): No     Lack of Transportation (Non-Medical): No   Physical Activity: Sufficiently Active (9/21/2022)    Exercise Vital Sign     Days of Exercise per  Week: 3 days     Minutes of Exercise per Session: 60 min   Stress: No Stress Concern Present (9/21/2022)    [place] [employer] - Occupational Stress Questionnaire     Feeling of Stress : Not at all   Social Connections: Socially Integrated (9/21/2022)    Social Connection and Isolation Panel [NHANES]     Frequency of Communication with Friends and Family: Twice a week     Frequency of Social Gatherings with Friends and Family: Twice a week     Attends [employer] Services: More than 4 times per year     Active Member of Clubs or Organizations: Yes     Attends Club or Organization Meetings: Not on file     Marital Status:    Interpersonal Safety: Unknown (11/6/2023)    Interpersonal Safety     Do you feel physically and emotionally safe where you currently live?: Patient refused     Within the past 12 months, have you been hit, slapped, kicked or otherwise physically hurt by someone?: Patient refused     Within the past 12 months, have you been humiliated or emotionally abused in other ways by your partner or ex-partner?: Patient refused   Housing Stability: Low Risk  (9/21/2022)    Housing Stability Vital Sign     Unable to Pay for Housing in the Last Year: No     Number of Places Lived in the Last Year: 1     Unstable Housing in the Last Year: No            Allergies:   Patient has no known allergies.         Review of Systems:  From intake questionnaire     Negative 14 system review except as noted on HPI, nurse's note see below.         Physical Exam:     Vitals:  No vitals were obtained today due to virtual visit.    Physical Exam   EYES: Eyes grossly normal to inspection.  No discharge or erythema, or obvious scleral/conjunctival abnormalities.  SKIN: Visible skin clear. No significant rash, abnormal pigmentation or lesions.  NEURO: Cranial nerves grossly intact.  Mentation and speech appropriate for age.  GENERAL: Healthy, alert and no distress  RESP: No audible wheeze, cough, or  "visible cyanosis.  No visible retractions or increased work of breathing.    PSYCH: Mentation appears normal, affect normal/bright, judgement and insight intact, normal speech and appearance well-groomed.          Labs and Pathology:    I personally reviewed all applicable laboratory and pathology data reviewed with patient      Lab Results   Component Value Date    WBC 7.4 02/09/2024    WBC 9.5 02/21/2012     Lab Results   Component Value Date    RBC 5.11 02/09/2024    RBC 5.43 02/21/2012     Lab Results   Component Value Date    HGB 15.5 02/09/2024    HGB 17.1 02/21/2012     Lab Results   Component Value Date    HCT 45.5 02/09/2024    HCT 48.6 02/21/2012     No components found for: \"MCT\"  Lab Results   Component Value Date    MCV 89 02/09/2024    MCV 90 02/21/2012     Lab Results   Component Value Date    MCH 30.3 02/09/2024    MCH 31.5 02/21/2012     Lab Results   Component Value Date    MCHC 34.1 02/09/2024    MCHC 35.2 02/21/2012     Lab Results   Component Value Date    RDW 13.2 02/09/2024    RDW 13.4 02/21/2012     Lab Results   Component Value Date     02/09/2024     02/21/2012       Last Comprehensive Metabolic Panel:  Sodium   Date Value Ref Range Status   02/09/2024 136 135 - 145 mmol/L Final     Comment:     Reference intervals for this test were updated on 09/26/2023 to more accurately reflect our healthy population. There may be differences in the flagging of prior results with similar values performed with this method. Interpretation of those prior results can be made in the context of the updated reference intervals.    05/14/2019 139 133 - 144 mmol/L Final     Potassium   Date Value Ref Range Status   02/09/2024 4.0 3.4 - 5.3 mmol/L Final   09/23/2022 4.6 3.4 - 5.3 mmol/L Final   05/14/2019 4.6 3.4 - 5.3 mmol/L Final     Chloride   Date Value Ref Range Status   02/09/2024 97 (L) 98 - 107 mmol/L Final   09/23/2022 105 94 - 109 mmol/L Final   05/14/2019 104 94 - 109 mmol/L Final "     Carbon Dioxide   Date Value Ref Range Status   05/14/2019 28 20 - 32 mmol/L Final     Carbon Dioxide (CO2)   Date Value Ref Range Status   02/09/2024 26 22 - 29 mmol/L Final   09/23/2022 29 20 - 32 mmol/L Final     Anion Gap   Date Value Ref Range Status   02/09/2024 13 7 - 15 mmol/L Final   09/23/2022 5 3 - 14 mmol/L Final   05/14/2019 7 3 - 14 mmol/L Final     Glucose   Date Value Ref Range Status   02/09/2024 93 70 - 99 mg/dL Final   09/23/2022 118 (H) 70 - 99 mg/dL Final   05/14/2019 101 (H) 70 - 99 mg/dL Final     Comment:     Fasting specimen     Urea Nitrogen   Date Value Ref Range Status   02/09/2024 17.5 6.0 - 20.0 mg/dL Final   09/23/2022 17 7 - 30 mg/dL Final   05/14/2019 17 7 - 30 mg/dL Final     Creatinine   Date Value Ref Range Status   02/09/2024 0.95 0.67 - 1.17 mg/dL Final   05/14/2019 1.04 0.66 - 1.25 mg/dL Final     GFR Estimate   Date Value Ref Range Status   02/09/2024 >90 >60 mL/min/1.73m2 Final   05/14/2019 85 >60 mL/min/[1.73_m2] Final     Comment:     Non  GFR Calc  Starting 12/18/2018, serum creatinine based estimated GFR (eGFR) will be   calculated using the Chronic Kidney Disease Epidemiology Collaboration   (CKD-EPI) equation.       Calcium   Date Value Ref Range Status   02/09/2024 9.6 8.6 - 10.0 mg/dL Final   05/14/2019 8.9 8.5 - 10.1 mg/dL Final     Bilirubin Total   Date Value Ref Range Status   02/09/2024 0.5 <=1.2 mg/dL Final   05/14/2019 0.9 0.2 - 1.3 mg/dL Final     Alkaline Phosphatase   Date Value Ref Range Status   02/09/2024 59 40 - 150 U/L Final     Comment:     Reference intervals for this test were updated on 11/14/2023 to more accurately reflect our healthy population. There may be differences in the flagging of prior results with similar values performed with this method. Interpretation of those prior results can be made in the context of the updated reference intervals.   05/14/2019 73 40 - 150 U/L Final     ALT   Date Value Ref Range Status  "  02/09/2024 50 0 - 70 U/L Final     Comment:     Reference intervals for this test were updated on 6/12/2023 to more accurately reflect our healthy population. There may be differences in the flagging of prior results with similar values performed with this method. Interpretation of those prior results can be made in the context of the updated reference intervals.     05/14/2019 53 0 - 70 U/L Final     AST   Date Value Ref Range Status   02/09/2024 27 0 - 45 U/L Final     Comment:     Reference intervals for this test were updated on 6/12/2023 to more accurately reflect our healthy population. There may be differences in the flagging of prior results with similar values performed with this method. Interpretation of those prior results can be made in the context of the updated reference intervals.   05/14/2019 27 0 - 45 U/L Final         INR   Date Value Ref Range Status   02/21/2012 1.03 0.86 - 1.14 Final        No components found for: \"URINE\"              Imaging:    I personally viewed all applicable imaging and interpreted them and went over the results with the patient.  Mass/lesion details are as follows    The mass/lesion is located in the Right side and is primarily located in the inter polar region.  The tumor is also 50 % endophytic and exophytic.  The mass/lesion primarily lies on the lateral surface.  With regard to the collecting system, the edge of the tumor arrives between 4-7 mm from the collecting system.             Assessment and Plan:     Assessment:  53-year-old male with complex right cystic renal lesion concerning for renal cell carcinoma      We had a long conversation regarding the management of this lesion.  He is not comfortable with the idea of active surveillance.  We discussed ablation as an option but would carry potential risk of tumor spreading outside the kidney given the cystic nature of the lesion.  We then discussed robotic partial nephrectomy as an option.  The procedure itself " and periprocedural phase were carefully reviewed.  The risks of the procedure including but not limited to infection, bleeding, damage to surrounding structures, and urine leak and need for radical nephrectomy in case of emergency were discussed.  All his questions were answered to satisfaction.            Plan:  Scheduled for right robotic partial possible radical nephrectomy  Needs PAC visit      45 total minutes spent on the date of the encounter including direct interaction with the patient, performing chart review, documentation and further activities as noted above      Bossman Glaser MD   Department of Urology   [facility]

## 2024-03-08 ENCOUNTER — TELEPHONE (OUTPATIENT)
Dept: UROLOGY | Facility: CLINIC | Age: 54
End: 2024-03-08
Payer: COMMERCIAL

## 2024-03-08 DIAGNOSIS — N28.89 RIGHT RENAL MASS: Primary | ICD-10-CM

## 2024-03-08 DIAGNOSIS — M05.9 SEROPOSITIVE RHEUMATOID ARTHRITIS (H): Primary | ICD-10-CM

## 2024-03-08 RX ORDER — PREDNISONE 5 MG/1
TABLET ORAL
Qty: 50 TABLET | Refills: 0 | Status: SHIPPED | OUTPATIENT
Start: 2024-03-08 | End: 2024-03-28

## 2024-03-08 NOTE — TELEPHONE ENCOUNTER
Called patient to discuss surgery scheduling. Spoke with patient and scheduled surgery with Dr. Glaser. Surgery is scheduled at [facility] on 4/30.    H&P scheduled with PAC for 4/15 virtually. Patient is aware they will get their arrival time for surgery at PAC appointment.    Patient will need a 10 day post-op face to face.    Writer will mail surgery packet.    Patient is inquiring about the recovery time and what to expect post-op. Patient noted Dr. Glaser told him his RN would call to go over pre-op teaching, and said he would ask the question to Padmini at that time.    Shazia Knox on 3/8/2024 at 3:49 PM

## 2024-03-08 NOTE — TELEPHONE ENCOUNTER
[facility] Call Center    Phone Message    May a detailed message be left on voicemail: no     Reason for Call: Other: pt calling to set up procedure, please advise     Action Taken: Other: urology    Travel Screening: Not Applicable

## 2024-03-18 NOTE — TELEPHONE ENCOUNTER
FUTURE VISIT INFORMATION      SURGERY INFORMATION:  Date: 24  Location: uu or  Surgeon:  Bossman Glaser MD   Anesthesia Type:  general  Procedure: Robot assisted Laparoscopic Partial possible Radical NEPHRECTOMY   RECORDS REQUESTED FROM:       Primary Care Provider: John Puri MD - [facility]    Pertinent Medical History: hypertension    Most recent EKG+ Tracin21

## 2024-03-22 ENCOUNTER — MYC MEDICAL ADVICE (OUTPATIENT)
Dept: RHEUMATOLOGY | Facility: CLINIC | Age: 54
End: 2024-03-22
Payer: COMMERCIAL

## 2024-03-27 NOTE — TELEPHONE ENCOUNTER
Called pt back and relayed message from provider stating it will be hard to get him in to see Derm. But may be if rash persists despite eval by PCP, we can consider ref to Derm.     Pt verbalized understanding and states the rash is improving and not itching any more. He also stated his joints are doing okay.    Krystin Nuno RN  Adult Rheumatology Clinic

## 2024-04-15 ENCOUNTER — LAB (OUTPATIENT)
Dept: LAB | Facility: CLINIC | Age: 54
End: 2024-04-15
Payer: COMMERCIAL

## 2024-04-15 ENCOUNTER — ANESTHESIA EVENT (OUTPATIENT)
Dept: SURGERY | Facility: CLINIC | Age: 54
End: 2024-04-15
Payer: COMMERCIAL

## 2024-04-15 ENCOUNTER — PRE VISIT (OUTPATIENT)
Dept: SURGERY | Facility: CLINIC | Age: 54
End: 2024-04-15

## 2024-04-15 ENCOUNTER — VIRTUAL VISIT (OUTPATIENT)
Dept: SURGERY | Facility: CLINIC | Age: 54
End: 2024-04-15
Payer: COMMERCIAL

## 2024-04-15 VITALS — HEIGHT: 70 IN | BODY MASS INDEX: 31.5 KG/M2 | WEIGHT: 220 LBS

## 2024-04-15 DIAGNOSIS — N28.89 RIGHT RENAL MASS: ICD-10-CM

## 2024-04-15 DIAGNOSIS — Z01.818 PRE-OP EVALUATION: Primary | ICD-10-CM

## 2024-04-15 LAB
BASOPHILS # BLD AUTO: 0 10E3/UL (ref 0–0.2)
BASOPHILS NFR BLD AUTO: 0 %
EOSINOPHIL # BLD AUTO: 0.2 10E3/UL (ref 0–0.7)
EOSINOPHIL NFR BLD AUTO: 3 %
ERYTHROCYTE [DISTWIDTH] IN BLOOD BY AUTOMATED COUNT: 13.3 % (ref 10–15)
HCT VFR BLD AUTO: 45.1 % (ref 40–53)
HGB BLD-MCNC: 15.1 G/DL (ref 13.3–17.7)
IMM GRANULOCYTES # BLD: 0 10E3/UL
IMM GRANULOCYTES NFR BLD: 0 %
LYMPHOCYTES # BLD AUTO: 1.8 10E3/UL (ref 0.8–5.3)
LYMPHOCYTES NFR BLD AUTO: 24 %
MCH RBC QN AUTO: 30.8 PG (ref 26.5–33)
MCHC RBC AUTO-ENTMCNC: 33.5 G/DL (ref 31.5–36.5)
MCV RBC AUTO: 92 FL (ref 78–100)
MONOCYTES # BLD AUTO: 0.5 10E3/UL (ref 0–1.3)
MONOCYTES NFR BLD AUTO: 6 %
NEUTROPHILS # BLD AUTO: 4.9 10E3/UL (ref 1.6–8.3)
NEUTROPHILS NFR BLD AUTO: 66 %
PLATELET # BLD AUTO: 275 10E3/UL (ref 150–450)
RBC # BLD AUTO: 4.91 10E6/UL (ref 4.4–5.9)
WBC # BLD AUTO: 7.4 10E3/UL (ref 4–11)

## 2024-04-15 PROCEDURE — 99204 OFFICE O/P NEW MOD 45 MIN: CPT | Mod: 95 | Performed by: NURSE PRACTITIONER

## 2024-04-15 PROCEDURE — 36415 COLL VENOUS BLD VENIPUNCTURE: CPT

## 2024-04-15 PROCEDURE — 80053 COMPREHEN METABOLIC PANEL: CPT

## 2024-04-15 PROCEDURE — 85025 COMPLETE CBC W/AUTO DIFF WBC: CPT

## 2024-04-15 ASSESSMENT — PAIN SCALES - GENERAL: PAINLEVEL: NO PAIN (0)

## 2024-04-15 ASSESSMENT — ENCOUNTER SYMPTOMS: SEIZURES: 0

## 2024-04-15 ASSESSMENT — LIFESTYLE VARIABLES: TOBACCO_USE: 0

## 2024-04-15 NOTE — PATIENT INSTRUCTIONS
Preparing for Your Surgery      Name:  Geovanni Galvez   MRN:  [id number]   :  1970   Today's Date:  4/15/2024       Arriving for surgery:  Surgery date:  24  Arrival time:  12:30 pm  Surgery time: 2:30 pm    Please come to:     Please come to:       [facility]    [address] [place]  [address]     The [facility] [place] Patient/Visitor Ramp is at [address]. Patients and visitors who self-park will receive the reduced hospital parking rate. If the Patient /Visitor Ramp is full, please follow the signs to the [employer] car park located at the main hospital entrance.       parking is available (24 hours/ 7 days a week)      Discounted parking pass options are available for patients and visitors. They can be purchased at the [employer] desk at the main hospital entrance.     -    Stop at the security desk and they will direct surgery patients to the Surgery Check in and Family [facility]. [phone]        - If you need directions, a wheelchair or an escort please stop at the Information/security desk in the lobby.     What can I eat or drink?  -  You may eat and drink normally up to 8 hours prior to arrival time. (Until 4:30 am)  -  You may have clear liquids until 2 hours prior to arrival time. (Until 10:30 am)    Examples of clear liquids:  Water  Clear broth  Juices (apple, white grape, white cranberry  and cider) without pulp  Noncarbonated, powder based beverages  (lemonade and Golden-Aid)  Sodas (Sprite, 7-Up, ginger ale and seltzer)  Coffee or tea (without milk or cream)  Gatorade    -  No Alcohol or cannabis products for at least 24 hours before surgery.     Which medicines can I take?    Hold Aspirin for 7 days before surgery.   Hold Multivitamins for 7 days before surgery.  Hold Supplements for 7 days before surgery.  Hold Ibuprofen (Advil, Motrin) for 1 day(s)  before surgery--unless otherwise directed by surgeon.  Hold Naproxen (Aleve) for 4 days before surgery.    -  DO NOT take these medications the day of surgery:  Folic acid  Losartan-hydrochlorothiazide (Hyzaar)    -  PLEASE CONTINUE TO TAKE these medications per your usual routine:  Citalopram (Celexa)  Methotrexate - take this per your normal schedule    How do I prepare myself?  - Please take 2 showers (one the night prior to surgery and one the morning of surgery) using Scrubcare or Hibiclens soap.    Use this soap only from the neck to your toes.     Leave the soap on your skin for one minute--then rinse thoroughly.      You may use your own shampoo and conditioner. No other hair products.   - Please remove all jewelry and body piercings.  - No lotions, deodorants or fragrance.  - No makeup or fingernail polish.   - Bring your ID and insurance card.    -If you use a CPAP machine, please bring the CPAP machine, tubing, and mask to hospital.    -If you have a Deep Brain Stimulator, Spinal Cord Stimulator, or any Neuro Stimulator device---you must bring the remote control to the hospital.      ALL PATIENTS GOING HOME THE SAME DAY OF SURGERY ARE REQUIRED TO HAVE A RESPONSIBLE ADULT TO DRIVE AND BE IN ATTENDANCE WITH THEM FOR 24 HOURS FOLLOWING SURGERY.    Covid testing policy as of 12/06/2022  Your surgeon will notify and schedule you for a COVID test if one is needed before surgery--please direct any questions or COVID symptoms to your surgeon      Questions or Concerns:    - For any questions regarding the day of surgery or your hospital stay, please contact the Pre Admission Nursing Office at [phone].       - If you have health changes between today and your surgery, please call your surgeon.       - For questions after surgery, please call your surgeons office.           Current Visitor Guidelines    You may have 2 visitors in the pre op area.    Visiting hours: 8 a.m. to 8:30 p.m.    Patients confirmed or  suspected to have symptoms of COVID 19 or flu:     No visitors allowed for adult patients.   Children (under age 18) can have 1 named visitor.     People who are sick or showing symptoms of COVID 19 or flu:    Are not allowed to visit patients--we can only make exceptions in special situations.       Please follow these guidelines for your visit:          Please maintain social distance          Masking is optional--however at times you may be asked to wear a mask for the safety of yourself and others     Clean your hands with alcohol hand . Do this when you arrive at and leave the building and patient room,    And again after you touch your mask or anything in the room.     Go directly to and from the room you are visiting.     Stay in the patient s room during your visit. Limit going to other places in the hospital as much as possible     Leave bags and jackets at home or in the car.     For everyone s health, please don t come and go during your visit. That includes for smoking   during your visit.

## 2024-04-15 NOTE — H&P
Pre-Operative H & P     CC:  Preoperative exam to assess for increased cardiopulmonary risk while undergoing surgery and anesthesia.    Date of Encounter: 4/15/2024  Primary Care Physician:  John Puri     Reason for visit:   Encounter Diagnoses   Name Primary?    Pre-op evaluation Yes    Right renal mass        HPI  Geovanni Galvez is a 53 year old male who presents for pre-operative H & P in preparation for  Procedure Information       Case: [id number] Date/Time: 04/30/24 1430    Procedure: Robot assisted Laparoscopic Partial possible Radical NEPHRECTOMY (Right: Abdomen)    Anesthesia type: General    Diagnosis: Right renal mass [N28.89]    Pre-op diagnosis: Right renal mass [N28.89]    Location: [facility]  /  OR    Providers: Bossman Glaser MD            The patient presents to the PAC virtually today in preparation for the above scheduled procedure with comorbid conditions including HTN, rheumatoid arthritis, obesity and anxiety.     The patient was seen by Dr. Glaser virtually in the Urology Oncology Clinic for further evaluation and treatment of renal mass.  The patient recently underwent imaging for symptoms of RUQ pain which showed a complex cystic renal lesion.   The cyst is about 3 cm and is located on the posterior lateral surface of the right kidney.  Dr. Glaser counseled the patient of the findings and treatment options.  The patient has now been scheduled for the procedure as listed above.      History is obtained from the patient and chart review    Hx of abnormal bleeding or anti-platelet use: denies    Past Medical History  Past Medical History:   Diagnosis Date    Hypertension        Past Surgical History  Past Surgical History:   Procedure Laterality Date    COLONOSCOPY N/A 11/17/2022    Procedure: COLONOSCOPY;  Surgeon: John Blank MD;  Location:  GI       Prior to Admission Medications  Current Outpatient Medications   Medication Sig Dispense Refill    citalopram (CELEXA)  20 MG tablet Take 1 tablet (20 mg) by mouth daily (Patient taking differently: Take 20 mg by mouth every morning) 90 tablet 3    folic acid (FOLVITE) 1 MG tablet Take 1 tablet (1 mg) by mouth daily (Patient taking differently: Take 1 mg by mouth every morning) 90 tablet 0    losartan-hydrochlorothiazide (HYZAAR) 50-12.5 MG tablet Take 1 tablet by mouth daily (Patient taking differently: Take 1 tablet by mouth every morning) 90 tablet 3    methotrexate 2.5 MG tablet Take 6 tablets (15 mg) by mouth once a week (Patient taking differently: Take 20 mg by mouth once a week WEDNESDAY'S  8 Tablets) 72 tablet 0       Allergies  No Known Allergies    Social History  Social History     Socioeconomic History    Marital status:      Spouse name: Not on file    Number of children: Not on file    Years of education: Not on file    Highest education level: Not on file   Occupational History    Not on file   Tobacco Use    Smoking status: Never     Passive exposure: Never    Smokeless tobacco: Never   Vaping Use    Vaping status: Never Used   Substance and Sexual Activity    Alcohol use: Not Currently     Comment: last drink 2014    Drug use: No    Sexual activity: Yes   Other Topics Concern    Parent/sibling w/ CABG, MI or angioplasty before 65F 55M? Not Asked   Social History Narrative    Not on file     Social Determinants of Health     Financial Resource Strain: Low Risk  (11/24/2023)    Received from [facility]    Overall Financial Resource Strain (CARDIA)     Difficulty of Paying Living Expenses: Not hard at all   Food Insecurity: No Food Insecurity (11/24/2023)    Received from [facility]    Hunger Vital Sign     Worried About Running Out of Food in the Last Year: Never true     Ran Out of Food in the Last Year: Never true   Transportation Needs: No Transportation Needs (11/24/2023)    Received from [facility]    PRAPARE - Transportation     Lack of Transportation (Medical):  No     Lack of Transportation (Non-Medical): No   Physical Activity: Insufficiently Active (11/24/2023)    Received from [facility], [facility]    Exercise Vital Sign     Days of Exercise per Week: 4 days     Minutes of Exercise per Session: 30 min   Stress: No Stress Concern Present (9/21/2022)    [employer] [place] [employer] - Occupational Stress Questionnaire     Feeling of Stress : Not at all   Social Connections: Socially Integrated (9/21/2022)    Social Connection and Isolation Panel [NHANES]     Frequency of Communication with Friends and Family: Twice a week     Frequency of Social Gatherings with Friends and Family: Twice a week     Attends Druze Services: More than 4 times per year     Active Member of Clubs or Organizations: Yes     Attends Club or Organization Meetings: Not on file     Marital Status:    Interpersonal Safety: Unknown (11/6/2023)    Interpersonal Safety     Do you feel physically and emotionally safe where you currently live?: Patient refused     Within the past 12 months, have you been hit, slapped, kicked or otherwise physically hurt by someone?: Patient refused     Within the past 12 months, have you been humiliated or emotionally abused in other ways by your partner or ex-partner?: Patient refused   Housing Stability: Low Risk  (11/24/2023)    Received from [facility], [facility]    Housing Stability     What is your living situation today?: I have a steady place to live       Family History  Family History   Problem Relation Age of Onset    Alcohol/Drug Father     Cancer Father         pancreatic    Colon Cancer No family hx of        Review of Systems  The complete review of systems is negative other than noted in the HPI or here.   Anesthesia Evaluation   Pt has not had prior anesthetic         ROS/MED HX  ENT/Pulmonary:     (+)     MARLENI risk factors,  hypertension,                              (-) tobacco use and asthma   Neurologic:    (-) no seizures,  no CVA and no TIA   Cardiovascular: Comment: Denies cardiac symptoms including chest pain, SOB, palpitations, syncope, DUEÑAS, orthopnea, or PND.        (+)  hypertension- -   -  - -                                   (-) taking anticoagulants/antiplatelets   METS/Exercise Tolerance: >4 METS Comment: Exercises daily using [place].    Hematologic:    (-) history of blood clots, anemia and history of blood transfusion   Musculoskeletal:   (+)  arthritis (Rheumatoid arthritis>>presenting in hand, knees and ankle.),             GI/Hepatic:  - neg GI/hepatic ROS  (-) GERD   Renal/Genitourinary:  - neg Renal ROS     Endo: Comment: Record indicates Pre diabetes, but patient denies.  A1C 5.4    (+)               Obesity,    (-) thyroid disease and chronic steroid usage   Psychiatric/Substance Use:     (+) psychiatric history anxiety and depression    (-) alcohol abuse history and chronic opioid use history   Infectious Disease:  - neg infectious disease ROS     Malignancy:   (+) Malignancy (REnal lesion), History of Other.    Other:  - neg other ROS          Virtual visit -  No vitals were obtained    Physical Exam  Constitutional: Awake, alert, cooperative, no apparent distress, and appears stated age.  Eyes: Pupils equal  HENT: Normocephalic  Respiratory: non labored breathing   Neurologic: Awake, alert, oriented to name, place and time.   Neuropsychiatric: Calm, cooperative. Normal affect.      Prior Labs/Diagnostic Studies   All labs and imaging personally reviewed    Latest Reference Range & Units 02/09/24 20:38   Sodium 135 - 145 mmol/L 136   Potassium 3.4 - 5.3 mmol/L 4.0   Chloride 98 - 107 mmol/L 97 (L)   Carbon Dioxide (CO2) 22 - 29 mmol/L 26   Urea Nitrogen 6.0 - 20.0 mg/dL 17.5   Creatinine 0.67 - 1.17 mg/dL 0.95   GFR Estimate >60 mL/min/1.73m2 >90   Calcium 8.6 - 10.0 mg/dL 9.6   Anion Gap 7 - 15 mmol/L 13   Albumin 3.5 - 5.2 g/dL 4.9   Protein Total 6.4 - 8.3 g/dL 7.9   Alkaline Phosphatase 40 - 150 U/L 59   ALT  0 - 70 U/L 50   AST 0 - 45 U/L 27   Bilirubin Total <=1.2 mg/dL 0.5   Glucose 70 - 99 mg/dL 93   Lipase 13 - 60 U/L 37   WBC 4.0 - 11.0 10e3/uL 7.4   Hemoglobin 13.3 - 17.7 g/dL 15.5   Hematocrit 40.0 - 53.0 % 45.5   Platelet Count 150 - 450 10e3/uL 246   RBC Count 4.40 - 5.90 10e6/uL 5.11   MCV 78 - 100 fL 89   MCH 26.5 - 33.0 pg 30.3   MCHC 31.5 - 36.5 g/dL 34.1   RDW 10.0 - 15.0 % 13.2   % Neutrophils % 61   % Lymphocytes % 28   % Monocytes % 8   % Eosinophils % 2   % Basophils % 1   Absolute Basophils 0.0 - 0.2 10e3/uL 0.0   Absolute Eosinophils 0.0 - 0.7 10e3/uL 0.2   Absolute Immature Granulocytes <=0.4 10e3/uL 0.0   Absolute Lymphocytes 0.8 - 5.3 10e3/uL 2.1   Absolute Monocytes 0.0 - 1.3 10e3/uL 0.6   % Immature Granulocytes % 0   Absolute Neutrophils 1.6 - 8.3 10e3/uL 4.5   Absolute NRBCs 10e3/uL 0.0   NRBCs per 100 WBC <1 /100 0   (L): Data is abnormally low    Comprehensive Metabolic Panel  Specimen: Blood - Blood, Venous  Component  Ref Range & Units 4 mo ago Comments   Potassium, S  3.6 - 5.2 mmol/L 4.3    Sodium, S  135 - 145 mmol/L 138    Chloride, S  98 - 107 mmol/L 99    Bicarbonate, S  22 - 29 mmol/L 26    Anion Gap  7 - 15 13    BUN (Blood Urea Nitrogen), S  8 - 24 mg/dL 19    Creatinine  0.74 - 1.35 mg/dL 1.13    Estimated GFR (eGFR)  >=60 mL/min/BSA 78 Estimated GFR calculated using the 2021  CKD_EPI creatinine equation.   Calcium, Total, S  8.6 - 10.0 mg/dL 9.7    Glucose, S  70 - 140 mg/dL 92    Protein, Total, S  6.3 - 7.9 g/dL 7.0    Albumin, S  3.5 - 5.0 g/dL 4.8    Aspartate Aminotransferase (AST), S  8 - 48 U/L 37    Alkaline Phosphatase, S  40 - 129 U/L 91    Alanine Aminotransferase (ALT), S  7 - 55 U/L 98 High     Bilirubin, Total, S  0.0 - 1.2 mg/dL 0.4    Resulting Agency [facility]    Specimen Collected: 11/27/23  1:57 PM    Performed by: [facility] -      PROCEDURES  EKG 2021  Sinus  Bradycardia 56 bpm  WITHIN NORMAL LIMITS    Stress Echocardiogram 2012  Conclusions:  This  is a normal stress echocardiogram.  This is a negative electrocardiographic stress test for ischemia.  Patient did not experience chest discomfort.  The test results were reviewed with the patient   The patient's records and results personally reviewed by this provider.     Outside records reviewed from: Care Everywhere      Assessment    Geovanni Galvez is a 53 year old male seen as a PAC referral for risk assessment and optimization for anesthesia.    Plan/Recommendations  Pt will be optimized for the proposed procedure.  See below for details on the assessment, risk, and preoperative recommendations    NEUROLOGY  - No history of TIA, CVA or seizure    -Post Op delirium risk factors:  No risk identified    ENT  Airway:  On video, full well groomed beard.  Will need to be assessed DOS 2/2 virtual visit.  Mallampati: Unable to assess  TM: Unable to assess    CARDIAC  - No history of CAD and Afib    - HTN   ~ managed with losartan-hydrochlorothiazide combination    > hold DOS    - METS (Metabolic Equivalents)  Patient performs 4 or more METS exercise without symptoms             Total Score: 0      - RCRI-Very low risk: Class 1 0.4% complication rate             Total Score: 0        PULMONARY  - Obstructive Sleep Apnea  MARLENI risk with no formal diagnosis  MARLENI Medium Risk             Total Score: 4    MARLENI: Snores loudly    MARLENI: Hypertension    MARLENI: Over 50 ys old    MARLENI: Male      - Denies asthma or inhaler use  - Tobacco History    History   Smoking Status    Never   Smokeless Tobacco    Never       GI  - Denies h/o GERD    - PONV Medium Risk  Total Score: 2           1 AN PONV: Patient is not a current smoker    1 AN PONV: Intended Post Op Opioids        /RENAL  - complex right cystic renal lesion concerning for renal cell carcinoma    ~ above procedure scheduled   ~ preoperative labs ordered by Dr. Glaser and scheduled    - Baseline Creatinine  see above     ENDOCRINE    - BMI: Estimated body mass index is  "31.57 kg/m  as calculated from the following:    Height as of this encounter: 1.778 m (5' 10\").    Weight as of this encounter: 99.8 kg (220 lb).  Obesity (BMI >30)    - record indicates h/o prediabetic>>A1C withing normal  value.   Hemoglobin A1C   Date Value Ref Range Status   11/06/2023 5.4 0.0 - 5.6 % Final     Comment:     Normal <5.7%   Prediabetes 5.7-6.4%    Diabetes 6.5% or higher     Note: Adopted from ADA consensus guidelines.   02/06/2018 5.3 4.3 - 6.0 % Final       HEME  VTE Medium Risk 1.8%             Total Score: 7    VTE: Male    VTE: Current cancer      - No history of abnormal bleeding or antiplatelet use.    Hemoglobin   Date Value Ref Range Status   02/09/2024 15.5 13.3 - 17.7 g/dL Final   02/21/2012 17.1 13.3 - 17.7 g/dL Final   ]      RHEUM  - Seropositive Rheumatoid arthritis    ~ b/l [facility]MC arthrosis, chronic knee and feet pain and morning stiffness   ~ Follows with Dr. Vero Santiago in Rheumatology   ~ Methotrexate and folic acid  ~ Consideration for careful lifting and postioning techniques     PSYCH  - ISH   ~ Celexa     Different anesthesia methods/types have been discussed with the patient, but they are aware that the final plan will be decided by the assigned anesthesia provider on the date of service.      The patient is optimized for their procedure. AVS with information on surgery time/arrival time, meds and NPO status given by nursing staff. No further diagnostic testing indicated.    Please refer to the physical examination documented by the anesthesiologist in the anesthesia record on the day of surgery.    Video-Visit Details    Type of service:  Video Visit    Provider received verbal consent for a Video Visit from the patient? Yes     Originating Location (pt. Location): Home    Distant Location (provider location):  Off-site  Mode of Communication:  Video Conference via Contently  On the day of service:     Prep time: 16 minutes  Visit time: 10 minutes  Documentation time: 20 " minutes  ------------------------------------------  Total time: 46 minutes      ANKIT Houston CNP  Preoperative Assessment Center  [facility]  Clinic and [facility] Center  Phone: [phone]  Fax: [phone]

## 2024-04-15 NOTE — PROGRESS NOTES
Uri is a 53 year old who is being evaluated via a billable video visit.    How would you like to obtain your AVS? MyChart  If the video visit is dropped, the invitation should be resent by: Text to cell phone: [phone]    Subjective   Uri is a 53 year old, presenting for the following health issues:  Pre-Op Exam    HPI         Physical Exam

## 2024-04-16 LAB
ALBUMIN SERPL BCG-MCNC: 4.4 G/DL (ref 3.5–5.2)
ALP SERPL-CCNC: 58 U/L (ref 40–150)
ALT SERPL W P-5'-P-CCNC: 38 U/L (ref 0–70)
ANION GAP SERPL CALCULATED.3IONS-SCNC: 18 MMOL/L (ref 7–15)
AST SERPL W P-5'-P-CCNC: 24 U/L (ref 0–45)
BILIRUB SERPL-MCNC: 0.4 MG/DL
BUN SERPL-MCNC: 13.5 MG/DL (ref 6–20)
CALCIUM SERPL-MCNC: 9.2 MG/DL (ref 8.6–10)
CHLORIDE SERPL-SCNC: 103 MMOL/L (ref 98–107)
CREAT SERPL-MCNC: 1.12 MG/DL (ref 0.67–1.17)
DEPRECATED HCO3 PLAS-SCNC: 21 MMOL/L (ref 22–29)
EGFRCR SERPLBLD CKD-EPI 2021: 79 ML/MIN/1.73M2
GLUCOSE SERPL-MCNC: 90 MG/DL (ref 70–99)
POTASSIUM SERPL-SCNC: 4.2 MMOL/L (ref 3.4–5.3)
PROT SERPL-MCNC: 7.1 G/DL (ref 6.4–8.3)
SODIUM SERPL-SCNC: 142 MMOL/L (ref 135–145)

## 2024-04-17 ENCOUNTER — MYC REFILL (OUTPATIENT)
Dept: RHEUMATOLOGY | Facility: CLINIC | Age: 54
End: 2024-04-17

## 2024-04-17 ENCOUNTER — VIRTUAL VISIT (OUTPATIENT)
Dept: RHEUMATOLOGY | Facility: CLINIC | Age: 54
End: 2024-04-17
Payer: COMMERCIAL

## 2024-04-17 DIAGNOSIS — R76.8 POSITIVE ANA (ANTINUCLEAR ANTIBODY): ICD-10-CM

## 2024-04-17 DIAGNOSIS — R76.8 RHEUMATOID FACTOR POSITIVE: ICD-10-CM

## 2024-04-17 DIAGNOSIS — Z79.899 LONG-TERM USE OF HIGH-RISK MEDICATION: ICD-10-CM

## 2024-04-17 DIAGNOSIS — R76.8 CYCLIC CITRULLINATED PEPTIDE (CCP) ANTIBODY POSITIVE: ICD-10-CM

## 2024-04-17 DIAGNOSIS — M05.9 SEROPOSITIVE RHEUMATOID ARTHRITIS (H): ICD-10-CM

## 2024-04-17 DIAGNOSIS — M05.9 SEROPOSITIVE RHEUMATOID ARTHRITIS (H): Primary | ICD-10-CM

## 2024-04-17 PROCEDURE — 99213 OFFICE O/P EST LOW 20 MIN: CPT | Mod: 95 | Performed by: INTERNAL MEDICINE

## 2024-04-17 NOTE — PROGRESS NOTES
"Virtual Visit Details    Type of service:  Video Visit     Originating Location (pt. Location): {video visit patient location:253922::\"Home\"}  {PROVIDER LOCATION On-site should be selected for visits conducted from your clinic location or adjoining [facility] hospital, academic office, or other nearby [facility] building. Off-site should be selected for all other provider locations, including home:[id number]}  Distant Location (provider location):  {virtual location provider:340390}  Platform used for Video Visit: {Virtual Visit Platforms:195514::\"Kaymu\"}   "

## 2024-04-17 NOTE — PROGRESS NOTES
2024      Chief Complaint/Reason for Visit: seropositive RA    HPI:    Geovanni Galvez is a 53 year old White male with past medical history. listed below.  Currently he is on methotrexate 20 mg/week along with folic acid 2 mg daily.  He said his symptoms are 98% better compared to before and he does have some very minimal stiffness on the distal joints.  He is able to hold his  without any difficulty or swelling of joints.  However he has been having a air bubble like sensation in his right upper quadrant that has been going on for a while.  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      REVIEW OF SYSTEMS    Cardiovascular - neg for chest pain  Respiratory - neg for shortness of breath or cough   Gastrointestinal - neg for bloody diarrhea   Genitourinary - neg for blood in urine   Skin - neg for skin rashes  Neuro -neg for stroke or seizures   Hematologic - neg for easy bruising or blood clots   Noted to have lesion on MRI suspicious for cystic renal cell carcinoma and is about to undergo biopsy on .  Psych - neg for anxiety or depression   Family planning - has a  baby boy    Past Medical History:   Diagnosis Date    Hypertension      Past Surgical History:   Procedure Laterality Date    COLONOSCOPY N/A 2022    Procedure: COLONOSCOPY;  Surgeon: John Blank MD;  Location:  GI     Family History   Problem Relation Age of Onset    Alcohol/Drug Father     Cancer Father         pancreatic    Colon Cancer No family hx of      Social History     Socioeconomic History    Marital status:      Spouse name: None    Number of children: None    Years of education: None    Highest education level: None   Tobacco Use    Smoking status: Never    Smokeless tobacco: Never   Vaping Use    Vaping Use: Never used   Substance and Sexual Activity    Alcohol use: Not  Currently     Comment: last drink 2014    Drug use: No    Sexual activity: Yes     Social Determinants of Health     Financial Resource Strain: Low Risk  (9/21/2022)    Overall Financial Resource Strain (CARDIA)     Difficulty of Paying Living Expenses: Not hard at all   Food Insecurity: No Food Insecurity (9/21/2022)    Hunger Vital Sign     Worried About Running Out of Food in the Last Year: Never true     Ran Out of Food in the Last Year: Never true   Transportation Needs: No Transportation Needs (9/21/2022)    PRAPARE - Transportation     Lack of Transportation (Medical): No     Lack of Transportation (Non-Medical): No   Physical Activity: Sufficiently Active (9/21/2022)    Exercise Vital Sign     Days of Exercise per Week: 3 days     Minutes of Exercise per Session: 60 min   Stress: No Stress Concern Present (9/21/2022)    [employer] [place] [employer] - Occupational Stress Questionnaire     Feeling of Stress : Not at all   Social Connections: Socially Integrated (9/21/2022)    Social Connection and Isolation Panel [NHANES]     Frequency of Communication with Friends and Family: Twice a week     Frequency of Social Gatherings with Friends and Family: Twice a week     Attends [employer] Services: More than 4 times per year     Active Member of Clubs or Organizations: Yes     Marital Status:    Interpersonal Safety: Unknown (11/6/2023)    Interpersonal Safety     Do you feel physically and emotionally safe where you currently live?: Patient refused     Within the past 12 months, have you been hit, slapped, kicked or otherwise physically hurt by someone?: Patient refused     Within the past 12 months, have you been humiliated or emotionally abused in other ways by your partner or ex-partner?: Patient refused   Housing Stability: Low Risk  (9/21/2022)    Housing Stability Vital Sign     Unable to Pay for Housing in the Last Year: No     Number of Places Lived in the Last Year: 1     Unstable  Housing in the Last Year: No       No Known Allergies    Current Outpatient Medications   Medication Sig Dispense Refill    citalopram (CELEXA) 20 MG tablet Take 1 tablet (20 mg) by mouth daily (Patient taking differently: Take 20 mg by mouth every morning) 90 tablet 3    folic acid (FOLVITE) 1 MG tablet Take 1 tablet (1 mg) by mouth daily (Patient taking differently: Take 1 mg by mouth every morning) 90 tablet 0    losartan-hydrochlorothiazide (HYZAAR) 50-12.5 MG tablet Take 1 tablet by mouth daily (Patient taking differently: Take 1 tablet by mouth every morning) 90 tablet 3    methotrexate 2.5 MG tablet Take 6 tablets (15 mg) by mouth once a week (Patient taking differently: Take 20 mg by mouth once a week WEDNESDAY'S  8 Tablets) 72 tablet 0     No current facility-administered medications for this visit.       PHYSICAL EXAM    There were no vitals taken for this visit.    General: Alert, No apparent distress and Oriented to person, place, and time  Psych: Affect euthymic  Eyes: Sclera noninjected  Cardiovascular: Regular rate and rhythm, Normal S1 and S2 and No murmurs, rubs or gallops  Respiratory: Clear to auscultation with no wheezing or crackles  Neuro: Normal gait and Able to arise from seated position unassisted  Musculoskeletal: Hands:  Normal. No synovitis.   Wrists:  Normal.  Elbows:  Normal.  Shoulders:  Normal.  Feet:  Normal.  Ankles:  Normal.  Knees:  Normal.    LABS   Reviewed as below.     24  CBC-normal  CMP-normal creatinine and LFTs    Nov 2023  Vit D,TSH normal   A1c normal   CCP 12  RF 90  UA neg for blood and protein   SAIRA borderline pos 1;40, speckled  Uric acid normal  G6PD normal   QTB, treponema normal/eng  Hep B surface ab reactive, Hep B core ab and surface ag non reactive     2021  HCV neg   HIV neg     HAND BILATERAL THREE OR MORE VIEWS 11/6/2023 11:50 AM      HISTORY: Pain in both hands.      COMPARISON: None.                                                                        IMPRESSION: Mild thumb CMC joint arthrosis bilaterally. There is  normal bone mineralization and there are no erosions. No evidence of  an inflammatory arthropathy on either side. No definitive acute  fracture on either side. Apparent small linear lucency seen along the  volar base of the right ring finger at the PIP joint is favored to be  projectional, correlation with point tenderness in this area is  advised.      Small 2 mm metallic density in the soft tissues of the left small  finger likely reflects a small foreign body.     CHEST TWO VIEWS  July 14, 2021 10:49 AM      HISTORY: 50-year-old patient with chest wall pain on the left.                                                                       IMPRESSION: Since February 21, 2012, heart size is normal. No pleural  effusion, pneumothorax, or abnormal area of consolidation.     SHOULDER RIGHT THREE OR MORE VIEWS  8/14/2019 4:35 PM      HISTORY: Acute pain of right shoulder.     COMPARISON: None.                                                                      IMPRESSION: No acute or chronic bony abnormality. Distal acromial  morphology is type II and humeral acromial distance appears adequate.       ASSESSMENT      No diagnosis found.      (M25.50) Polyarthralgia  (primary encounter diagnosis)  (R76.8) Rheumatoid factor positive  (R76.8) Cyclic citrullinated peptide (CCP) antibody positive  Comment: RF-90, CCP-12. X ray hands - b/l CMC arthrosis.  Initial symptoms included pain and swelling of hands. He also has pain of knees and feet as well without swelling. Morning stiffness lasts for about an hour. Noted mild puffiness of right and left second MCP. It appears he has a mild case of RA. X ray without changes of inflammatory arthropathy. Started on SSZ in Nov 2023.  Discontinued later due to GI upset.Will avoid plaquenil as he may likely need more than that to control disease activity and patient also prefers to avoid HCQ after side effects were  explained.  He is able to make 100% fist bilaterally and it does appear that his RA is currently in remission.  Plan:   Continue SSZ, will increase to 1500 mg BID.  If SSZ causes GI upset, we can consider switching to Humira.   Check labs  No orders of the defined types were placed in this encounter.      No orders of the defined types were placed in this encounter.      (R76.8) Positive SAIRA (antinuclear antibody)  Comment: noted, 1:40, speckled. No clinical features of CTD.   Plan: check additional labs as below.    Long term use of high risk medication   Comment : SSZ  Plan : Patient counseled on the adverse effects of Sulfasalazine including infection, cytopenia, blood disorders or liver dysfunction or damage. Symptoms can include sore throat fever paleness purple spots on your skin, yellowing of your skin or the whites of your eyes; Serious skin disorders. Symptoms can include: flu-like symptoms, painful red or purple rash ,blistering , peeling skin; Kidney damage. Symptoms can include: difficulty urinating, making less urine, or not urinating at all. Patient counseled on the need for regular blood work to monitor . No known h/o G6PD deficiency    RTC - 3 months    I spent 41 minutes on the date of the encounter doing chart review, history and exam, documentation and orders per the note.      RONA GOMEZ MD    Division of Rheumatic & Autoimmune Diseases  [facility]      20

## 2024-04-17 NOTE — PROGRESS NOTES
TELE HEALTH NOTE- RHEUMATOLOGY    Verbal consent obtained from patient for telehealth services provided below.  Communication with patient was conducted via [employer].  Location of Patient: Home  Location of Provider: Home  I spent over 50% of the visit devoted to coordination of care and counseling with the patient, as documented under PLANS below.  Date of Service: 2024   Geovanni NICOLE Galvez       CC: New/Follow up for rheumatoid arthritis    HPI:    Patient is a 53 year old White male presenting for Telemedicine today as a follow-up patient. Currently he is on methotrexate 20 mg/week along with folic acid 2 mg daily.  He said his symptoms are 98% better compared to before and he does have some very minimal stiffness on the distal joints.  He is able to hold his  without any difficulty or swelling of joints.  However he has been having a air bubble like sensation in his right upper quadrant that has been going on for a while.  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     HISTORY:    Past Medical History:   Diagnosis Date    Hypertension        Past Surgical History:   Procedure Laterality Date    COLONOSCOPY N/A 2022    Procedure: COLONOSCOPY;  Surgeon: John Blank MD;  Location:  GI       Family History   Problem Relation Age of Onset    Alcohol/Drug Father     Cancer Father         pancreatic    Colon Cancer No family hx of        History   Smoking Status    Never   Smokeless Tobacco    Never       Social History    Substance and Sexual Activity      Alcohol use: Not Currently        Comment: last drink       History   Drug Use No       Social History     Social History Narrative    Not on file       Current Outpatient Medications   Medication Sig Dispense Refill    citalopram (CELEXA) 20 MG tablet Take 1 tablet (20 mg) by mouth daily (Patient taking differently: Take 20 mg by mouth  every morning) 90 tablet 3    folic acid (FOLVITE) 1 MG tablet Take 1 tablet (1 mg) by mouth daily (Patient taking differently: Take 1 mg by mouth every morning) 90 tablet 0    losartan-hydrochlorothiazide (HYZAAR) 50-12.5 MG tablet Take 1 tablet by mouth daily (Patient taking differently: Take 1 tablet by mouth every morning) 90 tablet 3    methotrexate 2.5 MG tablet Take 6 tablets (15 mg) by mouth once a week (Patient taking differently: Take 20 mg by mouth once a week WEDNESDAY'S  8 Tablets) 72 tablet 0         TELE HEALTH EXAM:    The patient sounded Alert and oriented  General: Alert, No apparent distress   Psych: Affect euthymic  Skin: No rashes noted  Neck: No visible swelling  Respiratory: Breathing appears normal  Fist forming 100%    1. Seropositive rheumatoid arthritis (H)    2. Cyclic citrullinated peptide (CCP) antibody positive    3. Rheumatoid factor positive    4. Positive SAIRA (antinuclear antibody)    5. Long-term use of high-risk medication        (M25.50) Polyarthralgia  (primary encounter diagnosis)  (R76.8) Rheumatoid factor positive  (R76.8) Cyclic citrullinated peptide (CCP) antibody positive  Comment: RF-90, CCP-12. X ray hands - b/l CMC arthrosis.  Initial symptoms included pain and swelling of hands. He also has pain of knees and feet as well without swelling. Morning stiffness lasts for about an hour. Noted mild puffiness of right and left second MCP. It appears he has a mild case of RA. X ray without changes of inflammatory arthropathy. Started on SSZ in Nov 2023.  Discontinued later due to GI upset.Will avoid plaquenil as he may likely need more than that to control disease activity and patient also prefers to avoid HCQ after side effects were explained.  Currently on methotrexate 20 mg/week and folic acid 1 mg daily.  He is able to make 100% fist bilaterally and it does appear that his RA is currently in remission.  Plan:   Continue methotrexate 20 mg/week and folic acid 1 mg  daily  Will not check labs today, will consider at next visit  He can continue methotrexate through the procedure and will await to see what the biopsy results show.   Follow-up in clinic in 3 to 4 months.    (R76.8) Positive SAIRA (antinuclear antibody)  Comment: noted, 1:40, speckled. No clinical features of CTD.   Plan: check additional labs as below.    Long term use of high risk medication   Comment : SSZ  Plan : Patient counseled on the adverse effects of Sulfasalazine including infection, cytopenia, blood disorders or liver dysfunction or damage. Symptoms can include sore throat fever paleness purple spots on your skin, yellowing of your skin or the whites of your eyes; Serious skin disorders. Symptoms can include: flu-like symptoms, painful red or purple rash ,blistering , peeling skin; Kidney damage. Symptoms can include: difficulty urinating, making less urine, or not urinating at all. Patient counseled on the need for regular blood work to monitor . No known h/o G6PD deficiency    Rtc in 3-4 months.     I spent 21 minutes on the date of the encounter doing chart review, history and documentation and orders per the note.    After visit summary (AVS ) documentation will be available through [employer] for this encounter.     My diagnostic impression and treatment plans were discussed at length with the patient.  All side effects as well as drug-drug interactions and risks discussed at length.    Vero Santiago MD

## 2024-04-18 ENCOUNTER — TELEPHONE (OUTPATIENT)
Dept: RHEUMATOLOGY | Facility: CLINIC | Age: 54
End: 2024-04-18
Payer: COMMERCIAL

## 2024-04-18 RX ORDER — METHOTREXATE 2.5 MG/1
20 TABLET ORAL WEEKLY
Qty: 84 TABLET | Refills: 0 | Status: SHIPPED | OUTPATIENT
Start: 2024-04-18 | End: 2024-06-05

## 2024-04-18 RX ORDER — FOLIC ACID 1 MG/1
1 TABLET ORAL DAILY
Qty: 90 TABLET | Refills: 0 | Status: SHIPPED | OUTPATIENT
Start: 2024-04-18 | End: 2024-08-04

## 2024-04-30 ENCOUNTER — HOSPITAL ENCOUNTER (OUTPATIENT)
Facility: CLINIC | Age: 54
Discharge: HOME | End: 2024-05-01
Attending: UROLOGY | Admitting: UROLOGY
Payer: COMMERCIAL

## 2024-04-30 ENCOUNTER — ANESTHESIA (OUTPATIENT)
Dept: SURGERY | Facility: CLINIC | Age: 54
End: 2024-04-30
Payer: COMMERCIAL

## 2024-04-30 DIAGNOSIS — G89.18 ACUTE POST-OPERATIVE PAIN: Primary | ICD-10-CM

## 2024-04-30 PROBLEM — Z98.890 POST-OPERATIVE STATE: Status: ACTIVE | Noted: 2024-04-30

## 2024-04-30 LAB
ABO/RH(D): NORMAL
ANTIBODY SCREEN: NEGATIVE
SPECIMEN EXPIRATION DATE: NORMAL

## 2024-04-30 PROCEDURE — 258N000003 HC RX IP 258 OP 636: Performed by: NURSE ANESTHETIST, CERTIFIED REGISTERED

## 2024-04-30 PROCEDURE — 999N000141 HC STATISTIC PRE-PROCEDURE NURSING ASSESSMENT: Performed by: UROLOGY

## 2024-04-30 PROCEDURE — 50543 LAPARO PARTIAL NEPHRECTOMY: CPT | Performed by: ANESTHESIOLOGY

## 2024-04-30 PROCEDURE — 250N000011 HC RX IP 250 OP 636: Performed by: STUDENT IN AN ORGANIZED HEALTH CARE EDUCATION/TRAINING PROGRAM

## 2024-04-30 PROCEDURE — 370N000017 HC ANESTHESIA TECHNICAL FEE, PER MIN: Performed by: UROLOGY

## 2024-04-30 PROCEDURE — 360N000080 HC SURGERY LEVEL 7, PER MIN: Performed by: UROLOGY

## 2024-04-30 PROCEDURE — 250N000013 HC RX MED GY IP 250 OP 250 PS 637: Performed by: STUDENT IN AN ORGANIZED HEALTH CARE EDUCATION/TRAINING PROGRAM

## 2024-04-30 PROCEDURE — 258N000003 HC RX IP 258 OP 636: Performed by: STUDENT IN AN ORGANIZED HEALTH CARE EDUCATION/TRAINING PROGRAM

## 2024-04-30 PROCEDURE — 272N000001 HC OR GENERAL SUPPLY STERILE: Performed by: UROLOGY

## 2024-04-30 PROCEDURE — 250N000011 HC RX IP 250 OP 636: Performed by: ANESTHESIOLOGY

## 2024-04-30 PROCEDURE — 86900 BLOOD TYPING SEROLOGIC ABO: CPT | Performed by: NURSE PRACTITIONER

## 2024-04-30 PROCEDURE — 250N000025 HC SEVOFLURANE, PER MIN: Performed by: UROLOGY

## 2024-04-30 PROCEDURE — 50543 LAPARO PARTIAL NEPHRECTOMY: CPT | Mod: RT | Performed by: UROLOGY

## 2024-04-30 PROCEDURE — 88342 IMHCHEM/IMCYTCHM 1ST ANTB: CPT | Mod: 26 | Performed by: PATHOLOGY

## 2024-04-30 PROCEDURE — 250N000011 HC RX IP 250 OP 636: Performed by: NURSE ANESTHETIST, CERTIFIED REGISTERED

## 2024-04-30 PROCEDURE — 710N000009 HC RECOVERY PHASE 1, LEVEL 1, PER MIN: Performed by: UROLOGY

## 2024-04-30 PROCEDURE — 88341 IMHCHEM/IMCYTCHM EA ADD ANTB: CPT | Mod: TC | Performed by: UROLOGY

## 2024-04-30 PROCEDURE — 250N000013 HC RX MED GY IP 250 OP 250 PS 637: Performed by: ANESTHESIOLOGY

## 2024-04-30 PROCEDURE — 88307 TISSUE EXAM BY PATHOLOGIST: CPT | Mod: 26 | Performed by: PATHOLOGY

## 2024-04-30 PROCEDURE — 250N000009 HC RX 250: Performed by: NURSE ANESTHETIST, CERTIFIED REGISTERED

## 2024-04-30 PROCEDURE — 36415 COLL VENOUS BLD VENIPUNCTURE: CPT | Performed by: NURSE PRACTITIONER

## 2024-04-30 PROCEDURE — 88341 IMHCHEM/IMCYTCHM EA ADD ANTB: CPT | Mod: 26 | Performed by: PATHOLOGY

## 2024-04-30 PROCEDURE — 50543 LAPARO PARTIAL NEPHRECTOMY: CPT | Performed by: NURSE ANESTHETIST, CERTIFIED REGISTERED

## 2024-04-30 PROCEDURE — 250N000011 HC RX IP 250 OP 636: Performed by: UROLOGY

## 2024-04-30 PROCEDURE — 76998 US GUIDE INTRAOP: CPT | Mod: 26 | Performed by: UROLOGY

## 2024-04-30 RX ORDER — ONDANSETRON 2 MG/ML
4 INJECTION INTRAMUSCULAR; INTRAVENOUS EVERY 6 HOURS PRN
Status: DISCONTINUED | OUTPATIENT
Start: 2024-04-30 | End: 2024-05-01 | Stop reason: HOSPADM

## 2024-04-30 RX ORDER — ACETAMINOPHEN 325 MG/1
975 TABLET ORAL ONCE
Status: COMPLETED | OUTPATIENT
Start: 2024-04-30 | End: 2024-04-30

## 2024-04-30 RX ORDER — FENTANYL CITRATE 50 UG/ML
25 INJECTION, SOLUTION INTRAMUSCULAR; INTRAVENOUS EVERY 5 MIN PRN
Status: DISCONTINUED | OUTPATIENT
Start: 2024-04-30 | End: 2024-04-30 | Stop reason: HOSPADM

## 2024-04-30 RX ORDER — ACETAMINOPHEN 325 MG/1
975 TABLET ORAL 3 TIMES DAILY
Status: DISCONTINUED | OUTPATIENT
Start: 2024-04-30 | End: 2024-05-01 | Stop reason: HOSPADM

## 2024-04-30 RX ORDER — NALOXONE HYDROCHLORIDE 0.4 MG/ML
0.1 INJECTION, SOLUTION INTRAMUSCULAR; INTRAVENOUS; SUBCUTANEOUS
Status: DISCONTINUED | OUTPATIENT
Start: 2024-04-30 | End: 2024-04-30 | Stop reason: HOSPADM

## 2024-04-30 RX ORDER — ONDANSETRON 4 MG/1
4 TABLET, ORALLY DISINTEGRATING ORAL EVERY 6 HOURS PRN
Status: DISCONTINUED | OUTPATIENT
Start: 2024-04-30 | End: 2024-05-01 | Stop reason: HOSPADM

## 2024-04-30 RX ORDER — NALOXONE HYDROCHLORIDE 0.4 MG/ML
0.2 INJECTION, SOLUTION INTRAMUSCULAR; INTRAVENOUS; SUBCUTANEOUS
Status: DISCONTINUED | OUTPATIENT
Start: 2024-04-30 | End: 2024-05-01 | Stop reason: HOSPADM

## 2024-04-30 RX ORDER — LIDOCAINE 40 MG/G
CREAM TOPICAL
Status: DISCONTINUED | OUTPATIENT
Start: 2024-04-30 | End: 2024-05-01 | Stop reason: HOSPADM

## 2024-04-30 RX ORDER — HYDROMORPHONE HCL IN WATER/PF 6 MG/30 ML
0.4 PATIENT CONTROLLED ANALGESIA SYRINGE INTRAVENOUS EVERY 5 MIN PRN
Status: DISCONTINUED | OUTPATIENT
Start: 2024-04-30 | End: 2024-04-30 | Stop reason: HOSPADM

## 2024-04-30 RX ORDER — LABETALOL HYDROCHLORIDE 5 MG/ML
INJECTION, SOLUTION INTRAVENOUS PRN
Status: DISCONTINUED | OUTPATIENT
Start: 2024-04-30 | End: 2024-04-30

## 2024-04-30 RX ORDER — HALOPERIDOL 5 MG/ML
1 INJECTION INTRAMUSCULAR
Status: DISCONTINUED | OUTPATIENT
Start: 2024-04-30 | End: 2024-04-30 | Stop reason: HOSPADM

## 2024-04-30 RX ORDER — HYDRALAZINE HYDROCHLORIDE 20 MG/ML
2.5-5 INJECTION INTRAMUSCULAR; INTRAVENOUS EVERY 10 MIN PRN
Status: DISCONTINUED | OUTPATIENT
Start: 2024-04-30 | End: 2024-04-30 | Stop reason: HOSPADM

## 2024-04-30 RX ORDER — SODIUM CHLORIDE, SODIUM LACTATE, POTASSIUM CHLORIDE, CALCIUM CHLORIDE 600; 310; 30; 20 MG/100ML; MG/100ML; MG/100ML; MG/100ML
INJECTION, SOLUTION INTRAVENOUS CONTINUOUS
Status: DISCONTINUED | OUTPATIENT
Start: 2024-04-30 | End: 2024-04-30 | Stop reason: HOSPADM

## 2024-04-30 RX ORDER — FENTANYL CITRATE 50 UG/ML
INJECTION, SOLUTION INTRAMUSCULAR; INTRAVENOUS PRN
Status: DISCONTINUED | OUTPATIENT
Start: 2024-04-30 | End: 2024-04-30

## 2024-04-30 RX ORDER — FENTANYL CITRATE 50 UG/ML
50 INJECTION, SOLUTION INTRAMUSCULAR; INTRAVENOUS EVERY 5 MIN PRN
Status: DISCONTINUED | OUTPATIENT
Start: 2024-04-30 | End: 2024-04-30 | Stop reason: HOSPADM

## 2024-04-30 RX ORDER — NALOXONE HYDROCHLORIDE 0.4 MG/ML
0.4 INJECTION, SOLUTION INTRAMUSCULAR; INTRAVENOUS; SUBCUTANEOUS
Status: DISCONTINUED | OUTPATIENT
Start: 2024-04-30 | End: 2024-05-01 | Stop reason: HOSPADM

## 2024-04-30 RX ORDER — CEFAZOLIN SODIUM/WATER 2 G/20 ML
2 SYRINGE (ML) INTRAVENOUS
Status: COMPLETED | OUTPATIENT
Start: 2024-04-30 | End: 2024-04-30

## 2024-04-30 RX ORDER — SODIUM CHLORIDE 9 MG/ML
INJECTION, SOLUTION INTRAVENOUS CONTINUOUS
Status: DISCONTINUED | OUTPATIENT
Start: 2024-04-30 | End: 2024-05-01

## 2024-04-30 RX ORDER — CITALOPRAM HYDROBROMIDE 20 MG/1
20 TABLET ORAL EVERY MORNING
Status: DISCONTINUED | OUTPATIENT
Start: 2024-05-01 | End: 2024-05-01 | Stop reason: HOSPADM

## 2024-04-30 RX ORDER — HYDROMORPHONE HCL IN WATER/PF 6 MG/30 ML
0.2 PATIENT CONTROLLED ANALGESIA SYRINGE INTRAVENOUS
Status: DISCONTINUED | OUTPATIENT
Start: 2024-04-30 | End: 2024-05-01 | Stop reason: HOSPADM

## 2024-04-30 RX ORDER — PROPOFOL 10 MG/ML
INJECTION, EMULSION INTRAVENOUS PRN
Status: DISCONTINUED | OUTPATIENT
Start: 2024-04-30 | End: 2024-04-30

## 2024-04-30 RX ORDER — AMOXICILLIN 250 MG
1 CAPSULE ORAL 2 TIMES DAILY
Status: DISCONTINUED | OUTPATIENT
Start: 2024-04-30 | End: 2024-05-01 | Stop reason: HOSPADM

## 2024-04-30 RX ORDER — HYDROMORPHONE HCL IN WATER/PF 6 MG/30 ML
0.2 PATIENT CONTROLLED ANALGESIA SYRINGE INTRAVENOUS EVERY 5 MIN PRN
Status: DISCONTINUED | OUTPATIENT
Start: 2024-04-30 | End: 2024-04-30 | Stop reason: HOSPADM

## 2024-04-30 RX ORDER — ONDANSETRON 2 MG/ML
INJECTION INTRAMUSCULAR; INTRAVENOUS PRN
Status: DISCONTINUED | OUTPATIENT
Start: 2024-04-30 | End: 2024-04-30

## 2024-04-30 RX ORDER — SODIUM CHLORIDE, SODIUM GLUCONATE, SODIUM ACETATE, POTASSIUM CHLORIDE AND MAGNESIUM CHLORIDE 526; 502; 368; 37; 30 MG/100ML; MG/100ML; MG/100ML; MG/100ML; MG/100ML
INJECTION, SOLUTION INTRAVENOUS CONTINUOUS PRN
Status: DISCONTINUED | OUTPATIENT
Start: 2024-04-30 | End: 2024-04-30

## 2024-04-30 RX ORDER — CEFAZOLIN SODIUM/WATER 2 G/20 ML
2 SYRINGE (ML) INTRAVENOUS SEE ADMIN INSTRUCTIONS
Status: DISCONTINUED | OUTPATIENT
Start: 2024-04-30 | End: 2024-04-30 | Stop reason: HOSPADM

## 2024-04-30 RX ORDER — LABETALOL HYDROCHLORIDE 5 MG/ML
10 INJECTION, SOLUTION INTRAVENOUS
Status: DISCONTINUED | OUTPATIENT
Start: 2024-04-30 | End: 2024-04-30 | Stop reason: HOSPADM

## 2024-04-30 RX ORDER — ACETAMINOPHEN 325 MG/1
975 TABLET ORAL ONCE
Status: DISCONTINUED | OUTPATIENT
Start: 2024-04-30 | End: 2024-04-30 | Stop reason: HOSPADM

## 2024-04-30 RX ORDER — OXYCODONE HYDROCHLORIDE 10 MG/1
10 TABLET ORAL EVERY 4 HOURS PRN
Status: DISCONTINUED | OUTPATIENT
Start: 2024-04-30 | End: 2024-05-01 | Stop reason: HOSPADM

## 2024-04-30 RX ORDER — LIDOCAINE HYDROCHLORIDE 20 MG/ML
INJECTION, SOLUTION INFILTRATION; PERINEURAL PRN
Status: DISCONTINUED | OUTPATIENT
Start: 2024-04-30 | End: 2024-04-30

## 2024-04-30 RX ORDER — OXYCODONE HYDROCHLORIDE 5 MG/1
5 TABLET ORAL EVERY 4 HOURS PRN
Status: DISCONTINUED | OUTPATIENT
Start: 2024-04-30 | End: 2024-05-01 | Stop reason: HOSPADM

## 2024-04-30 RX ORDER — BUPIVACAINE HYDROCHLORIDE 2.5 MG/ML
INJECTION, SOLUTION INFILTRATION; PERINEURAL PRN
Status: DISCONTINUED | OUTPATIENT
Start: 2024-04-30 | End: 2024-04-30 | Stop reason: HOSPADM

## 2024-04-30 RX ORDER — MAGNESIUM SULFATE HEPTAHYDRATE 40 MG/ML
2 INJECTION, SOLUTION INTRAVENOUS ONCE
Status: COMPLETED | OUTPATIENT
Start: 2024-04-30 | End: 2024-04-30

## 2024-04-30 RX ORDER — DEXAMETHASONE SODIUM PHOSPHATE 4 MG/ML
INJECTION, SOLUTION INTRA-ARTICULAR; INTRALESIONAL; INTRAMUSCULAR; INTRAVENOUS; SOFT TISSUE PRN
Status: DISCONTINUED | OUTPATIENT
Start: 2024-04-30 | End: 2024-04-30

## 2024-04-30 RX ADMIN — DOCUSATE SODIUM 50 MG AND SENNOSIDES 8.6 MG 1 TABLET: 8.6; 5 TABLET, FILM COATED ORAL at 21:21

## 2024-04-30 RX ADMIN — Medication 30 MG: at 15:02

## 2024-04-30 RX ADMIN — PHENYLEPHRINE HYDROCHLORIDE 100 MCG: 10 INJECTION INTRAVENOUS at 15:03

## 2024-04-30 RX ADMIN — PHENYLEPHRINE HYDROCHLORIDE 200 MCG: 10 INJECTION INTRAVENOUS at 15:07

## 2024-04-30 RX ADMIN — SODIUM CHLORIDE: 9 INJECTION, SOLUTION INTRAVENOUS at 18:44

## 2024-04-30 RX ADMIN — HYDROMORPHONE HYDROCHLORIDE 0.2 MG: 0.2 INJECTION, SOLUTION INTRAMUSCULAR; INTRAVENOUS; SUBCUTANEOUS at 18:31

## 2024-04-30 RX ADMIN — PHENYLEPHRINE HYDROCHLORIDE 100 MCG: 10 INJECTION INTRAVENOUS at 16:44

## 2024-04-30 RX ADMIN — FENTANYL CITRATE 25 MCG: 50 INJECTION, SOLUTION INTRAMUSCULAR; INTRAVENOUS at 17:56

## 2024-04-30 RX ADMIN — PHENYLEPHRINE HYDROCHLORIDE 200 MCG: 10 INJECTION INTRAVENOUS at 15:05

## 2024-04-30 RX ADMIN — LABETALOL HYDROCHLORIDE 5 MG: 5 INJECTION INTRAVENOUS at 15:11

## 2024-04-30 RX ADMIN — HYDROMORPHONE HYDROCHLORIDE 0.5 MG: 1 INJECTION, SOLUTION INTRAMUSCULAR; INTRAVENOUS; SUBCUTANEOUS at 16:52

## 2024-04-30 RX ADMIN — FENTANYL CITRATE 25 MCG: 50 INJECTION, SOLUTION INTRAMUSCULAR; INTRAVENOUS at 17:47

## 2024-04-30 RX ADMIN — FENTANYL CITRATE 50 MCG: 50 INJECTION INTRAMUSCULAR; INTRAVENOUS at 14:57

## 2024-04-30 RX ADMIN — PROPOFOL 200 MG: 10 INJECTION, EMULSION INTRAVENOUS at 14:35

## 2024-04-30 RX ADMIN — FENTANYL CITRATE 100 MCG: 50 INJECTION INTRAMUSCULAR; INTRAVENOUS at 14:34

## 2024-04-30 RX ADMIN — FENTANYL CITRATE 100 MCG: 50 INJECTION INTRAMUSCULAR; INTRAVENOUS at 14:45

## 2024-04-30 RX ADMIN — ONDANSETRON 4 MG: 2 INJECTION INTRAMUSCULAR; INTRAVENOUS at 17:14

## 2024-04-30 RX ADMIN — Medication 50 MG: at 14:36

## 2024-04-30 RX ADMIN — ONDANSETRON 4 MG: 2 INJECTION INTRAMUSCULAR; INTRAVENOUS at 14:40

## 2024-04-30 RX ADMIN — MAGNESIUM SULFATE HEPTAHYDRATE 2 G: 2 INJECTION, SOLUTION INTRAVENOUS at 18:59

## 2024-04-30 RX ADMIN — Medication 20 MG: at 16:49

## 2024-04-30 RX ADMIN — DEXAMETHASONE SODIUM PHOSPHATE 10 MG: 4 INJECTION, SOLUTION INTRA-ARTICULAR; INTRALESIONAL; INTRAMUSCULAR; INTRAVENOUS; SOFT TISSUE at 14:40

## 2024-04-30 RX ADMIN — SUGAMMADEX 200 MG: 100 INJECTION, SOLUTION INTRAVENOUS at 17:19

## 2024-04-30 RX ADMIN — MIDAZOLAM 2 MG: 1 INJECTION INTRAMUSCULAR; INTRAVENOUS at 14:19

## 2024-04-30 RX ADMIN — SODIUM CHLORIDE, SODIUM GLUCONATE, SODIUM ACETATE, POTASSIUM CHLORIDE AND MAGNESIUM CHLORIDE: 526; 502; 368; 37; 30 INJECTION, SOLUTION INTRAVENOUS at 17:10

## 2024-04-30 RX ADMIN — ACETAMINOPHEN 975 MG: 325 TABLET, FILM COATED ORAL at 13:42

## 2024-04-30 RX ADMIN — Medication 2 G: at 14:45

## 2024-04-30 RX ADMIN — LIDOCAINE HYDROCHLORIDE 100 MG: 20 INJECTION, SOLUTION INFILTRATION; PERINEURAL at 14:34

## 2024-04-30 RX ADMIN — HYDROMORPHONE HYDROCHLORIDE 0.2 MG: 0.2 INJECTION, SOLUTION INTRAMUSCULAR; INTRAVENOUS; SUBCUTANEOUS at 19:34

## 2024-04-30 RX ADMIN — Medication 20 MG: at 16:15

## 2024-04-30 RX ADMIN — SODIUM CHLORIDE, SODIUM GLUCONATE, SODIUM ACETATE, POTASSIUM CHLORIDE AND MAGNESIUM CHLORIDE: 526; 502; 368; 37; 30 INJECTION, SOLUTION INTRAVENOUS at 14:22

## 2024-04-30 RX ADMIN — HYDROMORPHONE HYDROCHLORIDE 0.2 MG: 0.2 INJECTION, SOLUTION INTRAMUSCULAR; INTRAVENOUS; SUBCUTANEOUS at 21:33

## 2024-04-30 RX ADMIN — ACETAMINOPHEN 975 MG: 325 TABLET, FILM COATED ORAL at 21:22

## 2024-04-30 ASSESSMENT — ACTIVITIES OF DAILY LIVING (ADL)
ADLS_ACUITY_SCORE: 20
ADLS_ACUITY_SCORE: 20
ADLS_ACUITY_SCORE: 18
ADLS_ACUITY_SCORE: 20
ADLS_ACUITY_SCORE: 20
ADLS_ACUITY_SCORE: 21
ADLS_ACUITY_SCORE: 20

## 2024-04-30 ASSESSMENT — LIFESTYLE VARIABLES: TOBACCO_USE: 0

## 2024-04-30 ASSESSMENT — ENCOUNTER SYMPTOMS: SEIZURES: 0

## 2024-04-30 NOTE — ANESTHESIA PREPROCEDURE EVALUATION
Pre-Operative H & P     CC:  Preoperative exam to assess for increased cardiopulmonary risk while undergoing surgery and anesthesia.    Date of Encounter: 4/15/2024  Primary Care Physician:  John Puri     Reason for visit:   No diagnosis found.      FAITH Galvez is a 53 year old male who presents for pre-operative H & P in preparation for  Procedure Information       Case: [id number] Date/Time: 04/30/24 1430    Procedure: Robot assisted Laparoscopic Partial possible Radical NEPHRECTOMY (Right: Abdomen)    Anesthesia type: General    Diagnosis: Right renal mass [N28.89]    Pre-op diagnosis: Right renal mass [N28.89]    Location: [facility]  / [facility]    Providers: Bossman Glaser MD            The patient presents to the PAC virtually today in preparation for the above scheduled procedure with comorbid conditions including HTN, rheumatoid arthritis, obesity and anxiety.     The patient was seen by Dr. Glaser virtually in the Urology Oncology Clinic for further evaluation and treatment of renal mass.  The patient recently underwent imaging for symptoms of RUQ pain which showed a complex cystic renal lesion.   The cyst is about 3 cm and is located on the posterior lateral surface of the right kidney.  Dr. Glaser counseled the patient of the findings and treatment options.  The patient has now been scheduled for the procedure as listed above.      History is obtained from the patient and chart review    Hx of abnormal bleeding or anti-platelet use: denies    Past Medical History  Past Medical History:   Diagnosis Date     Hypertension        Past Surgical History  Past Surgical History:   Procedure Laterality Date     COLONOSCOPY N/A 11/17/2022    Procedure: COLONOSCOPY;  Surgeon: John Blank MD;  Location:  GI       Prior to Admission Medications  Current Outpatient Medications   Medication Sig Dispense Refill     citalopram (CELEXA) 20 MG tablet Take 1 tablet (20 mg) by mouth daily (Patient  taking differently: Take 20 mg by mouth every morning) 90 tablet 3     folic acid (FOLVITE) 1 MG tablet Take 1 tablet (1 mg) by mouth daily 90 tablet 0     losartan-hydrochlorothiazide (HYZAAR) 50-12.5 MG tablet Take 1 tablet by mouth daily (Patient taking differently: Take 1 tablet by mouth every morning) 90 tablet 3     methotrexate 2.5 MG tablet Take 8 tablets (20 mg) by mouth once a week WEDNESDAY'S  8 Tablets 84 tablet 0       Allergies  No Known Allergies    Social History  Social History     Socioeconomic History     Marital status:      Spouse name: Not on file     Number of children: Not on file     Years of education: Not on file     Highest education level: Not on file   Occupational History     Not on file   Tobacco Use     Smoking status: Never     Passive exposure: Never     Smokeless tobacco: Never   Vaping Use     Vaping status: Never Used   Substance and Sexual Activity     Alcohol use: Not Currently     Comment: last drink 2014     Drug use: No     Sexual activity: Yes   Other Topics Concern     Parent/sibling w/ CABG, MI or angioplasty before 65F 55M? Not Asked   Social History Narrative     Not on file     Social Determinants of Health     Financial Resource Strain: Low Risk  (11/24/2023)    Received from [facility]    Overall Financial Resource Strain (CARDIA)      Difficulty of Paying Living Expenses: Not hard at all   Food Insecurity: No Food Insecurity (11/24/2023)    Received from [facility]    Hunger Vital Sign      Worried About Running Out of Food in the Last Year: Never true      Ran Out of Food in the Last Year: Never true   Transportation Needs: No Transportation Needs (11/24/2023)    Received from [facility]    PRAPARE - Transportation      Lack of Transportation (Medical): No      Lack of Transportation (Non-Medical): No   Physical Activity: Insufficiently Active (11/24/2023)    Received from [facility]    Exercise Vital  Sign      Days of Exercise per Week: 4 days      Minutes of Exercise per Session: 30 min   Stress: No Stress Concern Present (9/21/2022)    [employer] [place] [employer] - Occupational Stress Questionnaire      Feeling of Stress : Not at all   Social Connections: Socially Integrated (9/21/2022)    Social Connection and Isolation Panel [NHANES]      Frequency of Communication with Friends and Family: Twice a week      Frequency of Social Gatherings with Friends and Family: Twice a week      Attends Zoroastrian Services: More than 4 times per year      Active Member of Clubs or Organizations: Yes      Attends Club or Organization Meetings: Not on file      Marital Status:    Interpersonal Safety: Unknown (11/6/2023)    Interpersonal Safety      Do you feel physically and emotionally safe where you currently live?: Patient refused      Within the past 12 months, have you been hit, slapped, kicked or otherwise physically hurt by someone?: Patient refused      Within the past 12 months, have you been humiliated or emotionally abused in other ways by your partner or ex-partner?: Patient refused   Housing Stability: Low Risk  (11/24/2023)    Received from [facility], [facility]    Housing Stability      What is your living situation today?: I have a steady place to live       Family History  Family History   Problem Relation Age of Onset     Alcohol/Drug Father      Cancer Father         pancreatic     Colon Cancer No family hx of        Review of Systems  The complete review of systems is negative other than noted in the HPI or here.   Anesthesia Evaluation   Pt has not had prior anesthetic         ROS/MED HX  ENT/Pulmonary:     (+)     MARLENI risk factors,  hypertension,                              (-) tobacco use and asthma   Neurologic:    (-) no seizures, no CVA and no TIA   Cardiovascular: Comment: Denies cardiac symptoms including chest pain, SOB, palpitations, syncope, DUEÑAS, orthopnea, or PND.         (+) Dyslipidemia hypertension- -   -  - -                                   (-) taking anticoagulants/antiplatelets   METS/Exercise Tolerance: >4 METS Comment: Exercises daily using [place].    Hematologic:    (-) history of blood clots, anemia and history of blood transfusion   Musculoskeletal: Comment: Rheumatoid arthritis  (+)  arthritis (Rheumatoid arthritis>>presenting in hand, knees and ankle.),             GI/Hepatic:  - neg GI/hepatic ROS  (-) GERD   Renal/Genitourinary:  - neg Renal ROS     Endo: Comment: Record indicates Pre diabetes, but patient denies.  A1C 5.4    (+)               Obesity,    (-) thyroid disease and chronic steroid usage   Psychiatric/Substance Use:     (+) psychiatric history anxiety and depression    (-) alcohol abuse history and chronic opioid use history   Infectious Disease:  - neg infectious disease ROS     Malignancy:   (+) Malignancy (REnal lesion), History of Other.    Other:  - neg other ROS          Virtual visit -  No vitals were obtained    Physical Exam  Constitutional: Awake, alert, cooperative, no apparent distress, and appears stated age.  Eyes: Pupils equal  HENT: Normocephalic  Respiratory: non labored breathing   Neurologic: Awake, alert, oriented to name, place and time.   Neuropsychiatric: Calm, cooperative. Normal affect.      Prior Labs/Diagnostic Studies   All labs and imaging personally reviewed    Latest Reference Range & Units 02/09/24 20:38   Sodium 135 - 145 mmol/L 136   Potassium 3.4 - 5.3 mmol/L 4.0   Chloride 98 - 107 mmol/L 97 (L)   Carbon Dioxide (CO2) 22 - 29 mmol/L 26   Urea Nitrogen 6.0 - 20.0 mg/dL 17.5   Creatinine 0.67 - 1.17 mg/dL 0.95   GFR Estimate >60 mL/min/1.73m2 >90   Calcium 8.6 - 10.0 mg/dL 9.6   Anion Gap 7 - 15 mmol/L 13   Albumin 3.5 - 5.2 g/dL 4.9   Protein Total 6.4 - 8.3 g/dL 7.9   Alkaline Phosphatase 40 - 150 U/L 59   ALT 0 - 70 U/L 50   AST 0 - 45 U/L 27   Bilirubin Total <=1.2 mg/dL 0.5   Glucose 70 - 99 mg/dL 93   Lipase 13  - 60 U/L 37   WBC 4.0 - 11.0 10e3/uL 7.4   Hemoglobin 13.3 - 17.7 g/dL 15.5   Hematocrit 40.0 - 53.0 % 45.5   Platelet Count 150 - 450 10e3/uL 246   RBC Count 4.40 - 5.90 10e6/uL 5.11   MCV 78 - 100 fL 89   MCH 26.5 - 33.0 pg 30.3   MCHC 31.5 - 36.5 g/dL 34.1   RDW 10.0 - 15.0 % 13.2   % Neutrophils % 61   % Lymphocytes % 28   % Monocytes % 8   % Eosinophils % 2   % Basophils % 1   Absolute Basophils 0.0 - 0.2 10e3/uL 0.0   Absolute Eosinophils 0.0 - 0.7 10e3/uL 0.2   Absolute Immature Granulocytes <=0.4 10e3/uL 0.0   Absolute Lymphocytes 0.8 - 5.3 10e3/uL 2.1   Absolute Monocytes 0.0 - 1.3 10e3/uL 0.6   % Immature Granulocytes % 0   Absolute Neutrophils 1.6 - 8.3 10e3/uL 4.5   Absolute NRBCs 10e3/uL 0.0   NRBCs per 100 WBC <1 /100 0   (L): Data is abnormally low    Comprehensive Metabolic Panel  Specimen: Blood - Blood, Venous  Component  Ref Range & Units 4 mo ago Comments   Potassium, S  3.6 - 5.2 mmol/L 4.3    Sodium, S  135 - 145 mmol/L 138    Chloride, S  98 - 107 mmol/L 99    Bicarbonate, S  22 - 29 mmol/L 26    Anion Gap  7 - 15 13    BUN (Blood Urea Nitrogen), S  8 - 24 mg/dL 19    Creatinine  0.74 - 1.35 mg/dL 1.13    Estimated GFR (eGFR)  >=60 mL/min/BSA 78 Estimated GFR calculated using the 2021  CKD_EPI creatinine equation.   Calcium, Total, S  8.6 - 10.0 mg/dL 9.7    Glucose, S  70 - 140 mg/dL 92    Protein, Total, S  6.3 - 7.9 g/dL 7.0    Albumin, S  3.5 - 5.0 g/dL 4.8    Aspartate Aminotransferase (AST), S  8 - 48 U/L 37    Alkaline Phosphatase, S  40 - 129 U/L 91    Alanine Aminotransferase (ALT), S  7 - 55 U/L 98 High     Bilirubin, Total, S  0.0 - 1.2 mg/dL 0.4    Resulting Agency [facility]    Specimen Collected: 11/27/23  1:57 PM    Performed by: [facility] -      PROCEDURES  EKG 2021  Sinus  Bradycardia 56 bpm  WITHIN NORMAL LIMITS    Stress Echocardiogram 2012  Conclusions:  This is a normal stress echocardiogram.  This is a negative electrocardiographic stress test for  "ischemia.  Patient did not experience chest discomfort.  The test results were reviewed with the patient   The patient's records and results personally reviewed by this provider.     Outside records reviewed from: Care Everywhere      Assessment    Geovanni Galvez is a 53 year old male seen as a PAC referral for risk assessment and optimization for anesthesia.    Plan/Recommendations  Pt will be optimized for the proposed procedure.  See below for details on the assessment, risk, and preoperative recommendations    NEUROLOGY  - No history of TIA, CVA or seizure    -Post Op delirium risk factors:  No risk identified    ENT  Airway:  On video, full well groomed beard.  Will need to be assessed DOS 2/2 virtual visit.  Mallampati: Unable to assess  TM: Unable to assess    CARDIAC  - No history of CAD and Afib    - HTN   ~ managed with losartan-hydrochlorothiazide combination    > hold DOS    - METS (Metabolic Equivalents)  Patient performs 4 or more METS exercise without symptoms             Total Score: 0      - RCRI-Very low risk: Class 1 0.4% complication rate             Total Score: 0        PULMONARY  - Obstructive Sleep Apnea  MARLENI risk with no formal diagnosis  MARLENI Medium Risk             Total Score: 4    MARLENI: Snores loudly    MARLENI: Hypertension    MARLENI: Over 50 ys old    MARLENI: Male      - Denies asthma or inhaler use  - Tobacco History    History   Smoking Status     Never   Smokeless Tobacco     Never       GI  - Denies h/o GERD    - PONV Medium Risk  Total Score: 2           1 AN PONV: Patient is not a current smoker    1 AN PONV: Intended Post Op Opioids        /RENAL  - complex right cystic renal lesion concerning for renal cell carcinoma    ~ above procedure scheduled   ~ preoperative labs ordered by Dr. Glaser and scheduled    - Baseline Creatinine  see above     ENDOCRINE    - BMI: Estimated body mass index is 31.57 kg/m  as calculated from the following:    Height as of 4/15/24: 1.778 m (5' 10\").    " Weight as of 4/15/24: 99.8 kg (220 lb).  Obesity (BMI >30)    - record indicates h/o prediabetic>>A1C withing normal  value.   Hemoglobin A1C   Date Value Ref Range Status   11/06/2023 5.4 0.0 - 5.6 % Final     Comment:     Normal <5.7%   Prediabetes 5.7-6.4%    Diabetes 6.5% or higher     Note: Adopted from ADA consensus guidelines.   02/06/2018 5.3 4.3 - 6.0 % Final       HEME  VTE Medium Risk 1.8%             Total Score: 7    VTE: Male    VTE: Current cancer      - No history of abnormal bleeding or antiplatelet use.    Hemoglobin   Date Value Ref Range Status   04/15/2024 15.1 13.3 - 17.7 g/dL Final   02/21/2012 17.1 13.3 - 17.7 g/dL Final   ]      RHEUM  - Seropositive Rheumatoid arthritis    ~ b/l CMC arthrosis, chronic knee and feet pain and morning stiffness   ~ Follows with Dr. Vero Santiago in Rheumatology   ~ Methotrexate and folic acid  ~ Consideration for careful lifting and postioning techniques     PSYCH  - ISH   ~ Celexa     Different anesthesia methods/types have been discussed with the patient, but they are aware that the final plan will be decided by the assigned anesthesia provider on the date of service.      The patient is optimized for their procedure. AVS with information on surgery time/arrival time, meds and NPO status given by nursing staff. No further diagnostic testing indicated.    Please refer to the physical examination documented by the anesthesiologist in the anesthesia record on the day of surgery.    Video-Visit Details    Type of service:  Video Visit    Provider received verbal consent for a Video Visit from the patient? Yes     Originating Location (pt. Location): Home    Distant Location (provider location):  Off-site  Mode of Communication:  Video Conference via [employer]  On the day of service:     Prep time: 16 minutes  Visit time: 10 minutes  Documentation time: 20 minutes  ------------------------------------------  Total time: 46 minutes      Renetta Pak, APRN  CNP  Preoperative Assessment Center  [facility]  Phone: [phone]  Fax: [phone]    Physical Exam    Airway        Mallampati: I   TM distance: > 3 FB   Neck ROM: full   Mouth opening: > 3 cm    Respiratory Devices and Support         Dental       (+) Minor Abnormalities - some fillings, tiny chips      Cardiovascular             Pulmonary               Anesthesia Plan    ASA Status:  2    NPO Status:  NPO Appropriate    Anesthesia Type: General.     - Airway: ETT   Induction: Intravenous.   Maintenance: Balanced.   Techniques and Equipment:     - Lines/Monitors: 2nd IV, BIS     - Blood: T&S     Consents    Anesthesia Plan(s) and associated risks, benefits, and realistic alternatives discussed. Questions answered and patient/representative(s) expressed understanding.     - Discussed:     - Discussed with:  Patient      - Extended Intubation/Ventilatory Support Discussed: No.      - Patient is DNR/DNI Status: No     Use of blood products discussed: Yes.     - Discussed with: Patient.     - Consented: consented to blood products     Postoperative Care    Pain management: IV analgesics, Oral pain medications, Multi-modal analgesia.   PONV prophylaxis: Ondansetron (or other 5HT-3), Dexamethasone or Solumedrol     Comments:

## 2024-04-30 NOTE — ANESTHESIA POSTPROCEDURE EVALUATION
Patient: Geovanni Galvez    Procedure: Procedure(s):  Robot assisted Laparoscopic Partial  NEPHRECTOMY       Anesthesia Type:  General    Note:  Disposition: Outpatient   Postop Pain Control: Uneventful            Sign Out: Well controlled pain   PONV: No   Neuro/Psych: Uneventful            Sign Out: Acceptable/Baseline neuro status   Airway/Respiratory: Uneventful            Sign Out: Acceptable/Baseline resp. status   CV/Hemodynamics: Uneventful            Sign Out: Acceptable CV status; No obvious hypovolemia; No obvious fluid overload   Other NRE: NONE   DID A NON-ROUTINE EVENT OCCUR? No           Last vitals:  Vitals Value Taken Time   /97 04/30/24 1815   Temp 37.1  C (98.8  F) 04/30/24 1735   Pulse 79 04/30/24 1823   Resp 13 04/30/24 1823   SpO2 95 % 04/30/24 1823   Vitals shown include unfiled device data.    Electronically Signed By: Grady Marks MD, MD  April 30, 2024  6:23 PM

## 2024-04-30 NOTE — ANESTHESIA CARE TRANSFER NOTE
Patient: Geovanni Galvez    Procedure: Procedure(s):  Robot assisted Laparoscopic Partial possible Radical NEPHRECTOMY       Diagnosis: Right renal mass [N28.89]  Diagnosis Additional Information: No value filed.    Anesthesia Type:   General     Note:    Oropharynx: spontaneously breathing and oropharynx clear of all foreign objects  Level of Consciousness: awake  Oxygen Supplementation: nasal cannula  Level of Supplemental Oxygen (L/min / FiO2): 3  Independent Airway: airway patency satisfactory and stable  Dentition: dentition unchanged  Vital Signs Stable: post-procedure vital signs reviewed and stable  Report to RN Given: handoff report given  Patient transferred to: PACU    Handoff Report: Identifed the Patient, Identified the Reponsible Provider, Reviewed the pertinent medical history, Discussed the surgical course, Reviewed Intra-OP anesthesia mangement and issues during anesthesia, Set expectations for post-procedure period and Allowed opportunity for questions and acknowledgement of understanding      Vitals:  Vitals Value Taken Time   /84 04/30/24 1736   Temp     Pulse 84 04/30/24 1741   Resp     SpO2 100 % 04/30/24 1741   Vitals shown include unfiled device data.    Electronically Signed By: ANKIT Rizo CRNA  April 30, 2024  5:41 PM

## 2024-04-30 NOTE — ANESTHESIA PROCEDURE NOTES
Airway       Patient location during procedure: OR       Procedure Start/Stop Times: 4/30/2024 2:38 PM  Staff -        Anesthesiologist:  Lilly Dodd MD       CRNA: Dorina Esqueda APRN CRNA       Performed By: CRNA  Consent for Airway        Urgency: elective  Indications and Patient Condition       Indications for airway management: yudelka-procedural       Induction type:intravenous       Mask difficulty assessment: 1 - vent by mask    Final Airway Details       Final airway type: endotracheal airway       Successful airway: ETT - single and Oral  Endotracheal Airway Details        ETT size (mm): 7.5       Cuffed: yes       Successful intubation technique: direct laryngoscopy       DL Blade Type: Pryor 2       Grade View of Cords: 3 (clear and open, grade 3 view with cricoid pressure)       Adjucts: stylet       Position: Right       Measured from: lips       Secured at (cm): 24       Bite block used: None    Post intubation assessment        Placement verified by: capnometry, equal breath sounds and chest rise        Number of attempts at approach: 1       Number of other approaches attempted: 0       Secured with: tape       Ease of procedure: easy       Dentition: Intact and Unchanged    Medication(s) Administered   Medication Administration Time: 4/30/2024 2:38 PM

## 2024-04-30 NOTE — OP NOTE
DATE OF SURGERY: 04/30/24  PREOPERATIVE DIAGNOSIS:  Right renal mass.   POSTOPERATIVE DIAGNOSIS: Right renal mass.   PROCEDURE PERFORMED:   1) Right robotic-assisted laparoscopic partial nephrectomy;   2) Laparoscopic ultrasound of renal mass with intraoperative interpretation of imaging   STAFF SURGEON: Bossman Glaser MD, present and scrubbed for all critical portions of the procedure, including the entire radiographic portion.  RESIDENT SURGEON: Delisa Sevilla, PGY-5 Urology Resident  ANESTHESIA: General.   ESTIMATED BLOOD LOSS: 100 mL.   DRAINS AND TUBES: Salas catheter  COMPLICATIONS: None.   DISPOSITION: PACU.   SPECIMENS OBTAINED:   Right renal mass  SIGNIFICANT FINDINGS: Tumor was resected with visually negative margins.    HISTORY OF PRESENT ILLNESS: 53M with a history of a right-sided mid pole renal mass. After a discussion of all risks, benefits, and alternatives, the patient elected to undergo definitive management with a partial nephrectomy.  OPERATION PERFORMED:   Informed consent was obtained and the patient was brought to the operating room where general anesthesia was induced. He was given appropriate preoperative antibiotics and positioned in the full flank position where all pressure points were carefully padded and secured. He was then prepped and draped in the usual sterile fashion. We then performed a timeout, verifying the correct patient's site and procedure to be performed.    Incision was made superior and lateral to the umbilicus a the lateral edge of the rectus. After confirmatory drop test, the Veress needle was used for insufflation. We placed a 12 mm assistant port and four total 8 mm robotic ports. The robot was docked.  A liver retractor was placed through the 5 mm port, using a locking Allis clamp. The colon was reflected medially to expose the anterior surface of Gerota's fascia. The medial aspect of the kidney was exposed and the ureter and gonadal vein were identified. Kidney was  lifted off the psoas muscle and dissection was carried posteriorly until the renal artery and vein were identified. The hilar vessels were carefully skeletonized. Once this was exposed, Gerota's was opened and the kidney was completely mobilized. The mass location was noted lateral and midpole. The ultrasound probe was then used to verify location of mass and outline the borders for dissection.  Artery was then clamped and excised the tumor using sharp dissection. Once the tumor was free, the base of the resection bed using was oversewn using 3-0 V-lock suture. At this point we unclamped the hilum; total clamp time was 19 minutes. A second weck placed for security. Theses were placed over Surgicel and Floseal. Hemostasis appeared excellent. Gerota's fascia was re approximated over the kidney and resection bed.  Specimen was placed in a 10 mm EndoCatch bag.  Ports were removed. The specimen was removed and sent to pathology from the assistant port with no need for extension. Skin was re approximated using 4-0 Monocryl. The wounds were dressed with skin glue. The patient was then awakened and taken to the PACU in stable condition.  Delisa Sevilla MD   PGY-5 Urology

## 2024-05-01 ENCOUNTER — TELEPHONE (OUTPATIENT)
Dept: PEDIATRICS | Facility: CLINIC | Age: 54
End: 2024-05-01
Payer: COMMERCIAL

## 2024-05-01 VITALS
HEIGHT: 71 IN | DIASTOLIC BLOOD PRESSURE: 77 MMHG | HEART RATE: 81 BPM | SYSTOLIC BLOOD PRESSURE: 131 MMHG | RESPIRATION RATE: 18 BRPM | WEIGHT: 226.19 LBS | OXYGEN SATURATION: 95 % | TEMPERATURE: 98 F | BODY MASS INDEX: 31.67 KG/M2

## 2024-05-01 LAB
ANION GAP SERPL CALCULATED.3IONS-SCNC: 12 MMOL/L (ref 7–15)
BUN SERPL-MCNC: 14.2 MG/DL (ref 6–20)
CALCIUM SERPL-MCNC: 8.3 MG/DL (ref 8.6–10)
CHLORIDE SERPL-SCNC: 102 MMOL/L (ref 98–107)
CREAT SERPL-MCNC: 1.29 MG/DL (ref 0.67–1.17)
DEPRECATED HCO3 PLAS-SCNC: 22 MMOL/L (ref 22–29)
EGFRCR SERPLBLD CKD-EPI 2021: 67 ML/MIN/1.73M2
ERYTHROCYTE [DISTWIDTH] IN BLOOD BY AUTOMATED COUNT: 13.1 % (ref 10–15)
GLUCOSE SERPL-MCNC: 185 MG/DL (ref 70–99)
HCT VFR BLD AUTO: 39.4 % (ref 40–53)
HGB BLD-MCNC: 13.4 G/DL (ref 13.3–17.7)
MCH RBC QN AUTO: 30.9 PG (ref 26.5–33)
MCHC RBC AUTO-ENTMCNC: 34 G/DL (ref 31.5–36.5)
MCV RBC AUTO: 91 FL (ref 78–100)
PLATELET # BLD AUTO: 227 10E3/UL (ref 150–450)
POTASSIUM SERPL-SCNC: 4.3 MMOL/L (ref 3.4–5.3)
RBC # BLD AUTO: 4.34 10E6/UL (ref 4.4–5.9)
SODIUM SERPL-SCNC: 136 MMOL/L (ref 135–145)
WBC # BLD AUTO: 12.7 10E3/UL (ref 4–11)

## 2024-05-01 PROCEDURE — 82374 ASSAY BLOOD CARBON DIOXIDE: CPT | Performed by: STUDENT IN AN ORGANIZED HEALTH CARE EDUCATION/TRAINING PROGRAM

## 2024-05-01 PROCEDURE — 250N000013 HC RX MED GY IP 250 OP 250 PS 637: Performed by: STUDENT IN AN ORGANIZED HEALTH CARE EDUCATION/TRAINING PROGRAM

## 2024-05-01 PROCEDURE — 85027 COMPLETE CBC AUTOMATED: CPT | Performed by: STUDENT IN AN ORGANIZED HEALTH CARE EDUCATION/TRAINING PROGRAM

## 2024-05-01 PROCEDURE — 36415 COLL VENOUS BLD VENIPUNCTURE: CPT | Performed by: STUDENT IN AN ORGANIZED HEALTH CARE EDUCATION/TRAINING PROGRAM

## 2024-05-01 PROCEDURE — 82565 ASSAY OF CREATININE: CPT | Performed by: STUDENT IN AN ORGANIZED HEALTH CARE EDUCATION/TRAINING PROGRAM

## 2024-05-01 RX ORDER — ACETAMINOPHEN 325 MG/1
650 TABLET ORAL EVERY 4 HOURS PRN
Qty: 100 TABLET | Refills: 0 | Status: SHIPPED | OUTPATIENT
Start: 2024-05-01 | End: 2024-08-13

## 2024-05-01 RX ORDER — OXYCODONE HYDROCHLORIDE 5 MG/1
5 TABLET ORAL EVERY 4 HOURS PRN
Qty: 14 TABLET | Refills: 0 | Status: SHIPPED | OUTPATIENT
Start: 2024-05-01 | End: 2024-08-13

## 2024-05-01 RX ORDER — AMOXICILLIN 250 MG
1-2 CAPSULE ORAL 2 TIMES DAILY
Qty: 45 TABLET | Refills: 1 | Status: SHIPPED | OUTPATIENT
Start: 2024-05-01 | End: 2024-08-13

## 2024-05-01 RX ADMIN — CITALOPRAM HYDROBROMIDE 20 MG: 20 TABLET ORAL at 07:38

## 2024-05-01 RX ADMIN — OXYCODONE HYDROCHLORIDE 10 MG: 10 TABLET ORAL at 05:17

## 2024-05-01 RX ADMIN — OXYCODONE HYDROCHLORIDE 10 MG: 10 TABLET ORAL at 09:39

## 2024-05-01 RX ADMIN — OXYCODONE HYDROCHLORIDE 5 MG: 5 TABLET ORAL at 00:39

## 2024-05-01 RX ADMIN — DOCUSATE SODIUM 50 MG AND SENNOSIDES 8.6 MG 1 TABLET: 8.6; 5 TABLET, FILM COATED ORAL at 07:38

## 2024-05-01 RX ADMIN — ACETAMINOPHEN 975 MG: 325 TABLET, FILM COATED ORAL at 07:38

## 2024-05-01 ASSESSMENT — ACTIVITIES OF DAILY LIVING (ADL)
ADLS_ACUITY_SCORE: 24
ADLS_ACUITY_SCORE: 21
ADLS_ACUITY_SCORE: 24
ADLS_ACUITY_SCORE: 24

## 2024-05-01 NOTE — PLAN OF CARE
"Goal Outcome Evaluation:    2030-2300  /69   Pulse 85   Temp 99.4  F (37.4  C) (Oral)   Resp 15   Ht 1.803 m (5' 11\")   Wt 102.6 kg (226 lb 3.1 oz)   SpO2 94%   BMI 31.55 kg/m      Admitted/transferred from: PACU   2 RN full   skin assessment completed by Ashtyn Thompson, NATALIE and Alisha ABREU RN.  Skin assessment finding: issues found x 6 lap sites    Interventions/actions: other No intervention needed      Will continue to monitor.    Pt. A&O, VSS, on Capno  C/o of abd pain, managed with PRN Dilaudid  Tolerating reg, with fair appetite. Wife brought food from outside for pt to eat  No BM, lux in place  PIV infusing NS @ 75 ml  X6 lap sites, glued, luciano  No acute change, continue w/poc          "

## 2024-05-01 NOTE — PHARMACY-ADMISSION MEDICATION HISTORY
Pharmacist Admission Medication History    Admission medication history is complete. The information provided in this note is only as accurate as the sources available at the time of the update.    Information Source(s):  Pre-op RN assessment, Dispense history (Surescripts)      Medication History Completed By: Esther Amador RP 4/30/2024 7:25 PM    PTA Med List   Medication Sig Last Dose    citalopram (CELEXA) 20 MG tablet Take 1 tablet (20 mg) by mouth daily (Patient taking differently: Take 20 mg by mouth every morning) 4/30/2024    folic acid (FOLVITE) 1 MG tablet Take 1 tablet (1 mg) by mouth daily 4/29/2024    losartan-hydrochlorothiazide (HYZAAR) 50-12.5 MG tablet Take 1 tablet by mouth daily (Patient taking differently: Take 1 tablet by mouth every morning) 4/29/2024    methotrexate 2.5 MG tablet Take 8 tablets (20 mg) by mouth once a week WEDNESDAY'S  8 Tablets Past Week

## 2024-05-01 NOTE — PLAN OF CARE
Discharge Goals that MUST be met PRIOR to Discharge IF PATIENT IS REGISTERED AS OUTPATIENT in a Bed:     Patient vital signs are at baseline MET  Patient able to ambulate as they were prior to admission or with assist devices provided by therapies during their stay. MET, ambulated in [place] x1 SBA  Patient MUST void prior to discharge NOT MET, lux catheter still in place  Patient able to tolerate oral intake to maintain hydration status MET, great PO intake and voiding adequately overnight  Patient has adequate pain control using Oral analgesics  MET, also using topical ice packs and abdominal splinting  Hypercapnia, hypoventilation or hypoxia resolved for at least 2 hours without supplemental oxygen MET  Deficits in sensation, mobility or coordination have resolved if spinal or regional anesthesia was used MET  Patient has returned to baseline mental status MET      Will notify Provider IF patient FAILS to meet Discharge Goal criteria.

## 2024-05-01 NOTE — PROGRESS NOTES
"Urology  Progress Note    24 hour events/Subjective:     - No acute events overnight   - Pain well controlled on current regimen   - Tolerating regular diet ; no nausea or vomiting   - Passing flatus, no bowel movement   - Ambulating      Exam  /66 (BP Location: Left arm)   Pulse 83   Temp 99  F (37.2  C) (Oral)   Resp 18   Ht 1.803 m (5' 11\")   Wt 102.6 kg (226 lb 3.1 oz)   SpO2 95%   BMI 31.55 kg/m    No acute distress  Unlabored breathing on room air  Abdomen soft, appropriately tender, nondistended. Incisions cdi   Lux with cyu urine, removed on am rounds       Intake/Output Summary (Last 24 hours) at 5/1/2024 0531  Last data filed at 5/1/2024 0518  Gross per 24 hour   Intake 2745 ml   Output 2215 ml   Net 530 ml       /1775    Labs  Recent Labs   Lab Test 04/15/24  1257 02/09/24  2038 01/31/24  1419   WBC 7.4 7.4 5.8   HGB 15.1 15.5 14.6   CR 1.12 0.95 1.13        AM labs pending      Assessment/Plan  53 year old y/o male POD#1 s/p right partial Nx.     Note - plan changes for today are in bold below.    Neuro: pain control: PRN oxycodone 5-10 mg q3h , PRN dilaudid 0.2 - 0.4 mg q2h, and scheduled tylenol   CV: HDS   Pulm: incentive spirometry while awake  FEN/GI: reg diet, MIVF @ 75/hr  Endo: relatively euglycemic   : lux in place, will do TOV today  Heme/ID: monitor for s/s of infection; monitor Hb and transfuse as indicated   Activity: Up as tolerated  Ambulate at least TID   Ppx: SCDs, ambulation  Home RX on HOLD: methotrexate, losartan HCTZ  Dispo: anticipate discharge to home today pending tov    Seen and examined with the chief resident. Will discuss with Bossman Glaser MD.    Geovanni Myers MD, PGY-2  Urology Resident      PTA medications:   Prior to Admission medications    Medication Sig Start Date End Date Taking? Authorizing Provider   citalopram (CELEXA) 20 MG tablet Take 1 tablet (20 mg) by mouth daily  Patient taking differently: Take 20 mg by mouth every morning " "11/28/23  Yes John Puri MD   folic acid (FOLVITE) 1 MG tablet Take 1 tablet (1 mg) by mouth daily 4/18/24  Yes Vero Santiago MD   losartan-hydrochlorothiazide (HYZAAR) 50-12.5 MG tablet Take 1 tablet by mouth daily  Patient taking differently: Take 1 tablet by mouth every morning 11/24/23  Yes John Puri MD   methotrexate 2.5 MG tablet Take 8 tablets (20 mg) by mouth once a week WEDNESDAY'S  8 Tablets 4/18/24  Yes Vero Santiago MD          Contacting the urology team: Please see [employer] and page on-call clinician with any questions or concerns regarding this patient. Note writer may be unavailable.     To access [employer] from intranet: under \"Applications\" --> \"Business Applications\" select \"[employer] Smartweb\" and search \"Urology Adult & Pediatric/[facility].\" Please note that any question about a urology inpatient, [place] or [place], should go to job code [id number].     "

## 2024-05-01 NOTE — TELEPHONE ENCOUNTER
Reason for Call:  Other appointment    Detailed comments: need post op appt with Dr Puri within a week had cyst removal    Phone Number Patient can be reached at: Cell number on file:    Telephone Information:   Mobile [phone]       Best Time:anytime    Can we leave a detailed message on this number? YES    Call taken on 5/1/2024 at 5:14 PM by KAYLA SO

## 2024-05-01 NOTE — PLAN OF CARE
"Goal Outcome Evaluation:      Plan of Care Reviewed With: patient     Blood pressure 131/77, pulse 81, temperature 98  F (36.7  C), temperature source Oral, resp. rate 18, height 1.803 m (5' 11\"), weight 102.6 kg (226 lb 3.1 oz), SpO2 95%.    DISCHARGE                         No discharge date for patient encounter.  ----------------------------------------------------------------------------  Discharged to: Home  Via: private transportation  Accompanied by: Family  Discharge Instructions: diet, activity, medications, follow up appointments, when to call the MD, aftercare instructions.  Prescriptions: To be filled by [facility] discharge pharmacy; medication list reviewed & sent with pt  Follow Up Appointments: arranged; information given  Belongings: All sent with pt  IV: d/c'd  Telemetry: n/a  Pt exhibits understanding of above discharge instructions; all questions answered.    Discharge Paperwork: Signed, copied, and sent home with patient.            "

## 2024-05-01 NOTE — DISCHARGE SUMMARY
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, negative margins  # Postoperative pain    Past Medical History:   Diagnosis Date    Hypertension           Procedures:     Procedure(s): 24 -   PROCEDURE PERFORMED:   1) Right robotic-assisted laparoscopic partial nephrectomy;   2) Laparoscopic ultrasound of renal mass with intraoperative interpretation of imaging   STAFF SURGEON: Bossman Glaser MD            Medications Prior to Admission:     Medications Prior to Admission   Medication Sig Dispense Refill Last Dose    citalopram (CELEXA) 20 MG tablet Take 1 tablet (20 mg) by mouth daily (Patient taking differently: Take 20 mg by mouth every morning) 90 tablet 3 2024    folic acid (FOLVITE) 1 MG tablet Take 1 tablet (1 mg) by mouth daily 90 tablet 0 2024    losartan-hydrochlorothiazide (HYZAAR) 50-12.5 MG tablet Take 1 tablet by mouth daily (Patient taking differently: Take 1 tablet by mouth every morning) 90 tablet 3 2024    methotrexate 2.5 MG tablet Take 8 tablets (20 mg) by mouth once a week WEDNESDAY'S  8 Tablets 84 tablet 0 Past Week             Discharge Medications:     Current Discharge Medication List        START taking these medications    Details   acetaminophen (TYLENOL) 325 MG tablet Take 2 tablets (650 mg) by mouth every 4 hours as needed for mild pain  Qty: 100 tablet, Refills: 0    Associated Diagnoses: Acute post-operative pain      oxyCODONE (ROXICODONE) 5 MG  tablet Take 1 tablet (5 mg) by mouth every 4 hours as needed for moderate to severe pain  Qty: 14 tablet, Refills: 0    Associated Diagnoses: Acute post-operative pain      senna-docusate (SENOKOT-S/PERICOLACE) 8.6-50 MG tablet Take 1-2 tablets by mouth 2 times daily  Qty: 45 tablet, Refills: 1    Associated Diagnoses: Acute post-operative pain           CONTINUE these medications which have NOT CHANGED    Details   citalopram (CELEXA) 20 MG tablet Take 1 tablet (20 mg) by mouth daily  Qty: 90 tablet, Refills: 3    Associated Diagnoses: ISH (generalized anxiety disorder)      folic acid (FOLVITE) 1 MG tablet Take 1 tablet (1 mg) by mouth daily  Qty: 90 tablet, Refills: 0    Associated Diagnoses: Seropositive rheumatoid arthritis (H)      losartan-hydrochlorothiazide (HYZAAR) 50-12.5 MG tablet Take 1 tablet by mouth daily  Qty: 90 tablet, Refills: 3    Associated Diagnoses: Essential hypertension with goal blood pressure less than 140/90      methotrexate 2.5 MG tablet Take 8 tablets (20 mg) by mouth once a week WEDNESDAY'S  8 Tablets  Qty: 84 tablet, Refills: 0    Associated Diagnoses: Seropositive rheumatoid arthritis (H)                   Consultations:   Consultation during this admission received:   None          Brief History of Illness:   Reason for admission requiring a surgical or invasive procedure:   Right renal mass [N28.89]   The patient underwent the following procedure(s):   See above   There were no immediate complications during this procedure.    Please refer to the full operative summary for details.           Hospital Course:   The patient's hospital course was unremarkable.  Geovanni Galvez recovered as anticipated and experienced no post-operative complications.      On POD #1 the patient underwent a successful voiding trial.  At this point he was also ambulating without assitance, tolerating the discharge diet, had pain controlled with PO medications to go home with, and was requiring no  "IV medications or fluids. He was discharged to home with appropriate contact information, follow-up and instructions as seen below in the discharge paperwork.         Discharge Labs:     Lab Results   Component Value Date    PSA 0.60 11/06/2023    PSA 0.85 09/23/2022     Recent Labs   Lab 05/01/24  0556   WBC 12.7*   HGB 13.4          Recent Labs   Lab 05/01/24  0556      POTASSIUM 4.3   CHLORIDE 102   CO2 22   BUN 14.2   CR 1.29*   *   KAREN 8.3*     No lab results found in last 7 days.    Invalid input(s): \"URINEBLOOD\"  No results found for this or any previous visit.         Discharge Instructions and Follow-Up:   Diet:  - Regular    Activity:   - No strenuous exercise for 4 weeks.   - No lifting, pushing, pulling more than 15 pounds for 4 weeks.   - Do not strain with bowel movements.   - Do not drive until you can press the brake pedal quickly and fully without pain.   - Do not operate a motor vehicle while taking narcotic pain medications.     Medications:   1) PAIN: To reduce your Opiate use, multiple non-opiate medications can be taken together.  First take Tylenol (acetaminophen/APAP). This has been prescribed for you.  Always follow prescribing guidelines.  Do not take more than 3,200 - 4,000mg of Tylenol (acetaminophen/ APAP) from all sources in any 24 hour period since this can cause liver damage.  Do not take more than 2000 - 2400mg of ibuprofen in any 24 hour period since this can cause kidney damage.     If you still have pain after using non-opiates, add Oxycodone, an Opiate medication that has been prescribed for pain.  Never drive, operate machinery or drink alcoholic beverages while you are taking Opiate pain medications. Opiates will cause sleepiness and constipation and can become addictive, therefore it is best to stop or reduce them as soon as you can.  Any left over Opiates should be disposed of with an Authorized  for unneeded medications.  Contact your [facility]'s or " [facility] [employer] household trash and recycling service to learn about medication disposal options and guidelines for your area.  If you decide to store this medication at home it should be kept in a locked cabinet to prevent access to children or visitors.     2) CONSTIPATION: Surgery, pain medications and bladder spasm medications can all make you constipated.  Pericolace (senna/docusate sodium) can be taken twice daily for prevention and treatment of constipation.  Other over the counter solutions such as prune juice, miralax, fiber products, senna, and dulcolax can also be used if you prefer.  Please reduce or stop these if you develop loose stools. If you are taking these but still have not had a bowel movement in 3 days, start over-the-counter Milk of Magnesia taken twice daily until you have a good result.  Call the office with any concerns.     Incisions:   - You may shower and get incisions wet starting 48 hrs after surgery.  - Do not scrub incisions or submerge wounds in a bath, pool, hot tub, etc. for 2 weeks.   - Your incisions were closed with dissolvable suture that will not need to be removed.  Commonly, purple dermabond glue is applied over the top of the sutures.  Avoid using lotions or ointments on your incisions.    - Leave incisions open to air.  Cover with gauze only if needed for comfort or to protect clothing from drainage.     Follow-Up:  - Schedule an appointment to be seen by your primary care provider within 7 days of discharge for a postoperative checkup  - Follow up with Dr. Glaser's team in the next 2-3 weeks to review pathology and for a postop appointment.  The Urology team will be calling you to arrange an appointment   - Call or return sooner than your regularly scheduled visit if you develop any of the following:  Fever (greater than 101.3F) or flu-like symptoms, uncontrolled pain, uncontrolled nausea or vomiting, as well as increased redness, swelling, or drainage from your  "wound or any difficulties passing urine.   - Drink 2-3L of water a day to keep your urine clear to light pink in color. Some light blood in your urine can occur but should clear with increased water consumption as instructed.    - Call if blood in urine persists, becomes darker, you see clots or have difficulty urinating    Phone numbers:   - Monday through Friday 8am to 4:30pm: Call [phone] with questions, requests for medication refills, or to schedule or confirm an appointment.  - Nights, weekends, or holidays: call the after hours emergency pager - [phone] and tell the  \"I would like to page the Urology Resident on call.\" Typically, the on-call provider should return your call within 30 minutes.  Please page the on-call provider again if you haven't been contacted as expected.  Rarely, the on-call provider will be unable to promptly return a call due to a hospital emergency.  If you have paged twice and are still not contacted, ask the hospital  to page the \"urology CHIEF-RESIDENT on call\".   - Please note that due to prescribing laws, resident physicians are unable to prescribe narcotics after-hours. If you feel as though you will need a refill of a narcotic pain medication, you will need to call the clinic during business hours OR seek emergency care.  - For emergencies, call 911         Discharge Disposition:     Discharged to home      Attestation: I have reviewed today's vital signs, notes, medications, labs and imaging.    Mag Joya PA-C  Urology Physician Assistant  Personal Pager: [phone]    Please call Job Code:   [phone] to reach the Urology resident or PA on call - Weekdays  [phone] to reach the Urology resident or PA on call - Weeknights and weekends    May 1, 2024         "

## 2024-05-06 ENCOUNTER — OFFICE VISIT (OUTPATIENT)
Dept: PEDIATRICS | Facility: CLINIC | Age: 54
End: 2024-05-06
Payer: COMMERCIAL

## 2024-05-06 VITALS
BODY MASS INDEX: 32.03 KG/M2 | RESPIRATION RATE: 16 BRPM | OXYGEN SATURATION: 99 % | TEMPERATURE: 97.5 F | WEIGHT: 228.8 LBS | HEIGHT: 71 IN | SYSTOLIC BLOOD PRESSURE: 134 MMHG | HEART RATE: 82 BPM | DIASTOLIC BLOOD PRESSURE: 80 MMHG

## 2024-05-06 DIAGNOSIS — I10 ESSENTIAL HYPERTENSION WITH GOAL BLOOD PRESSURE LESS THAN 140/90: ICD-10-CM

## 2024-05-06 DIAGNOSIS — N28.89 RENAL MASS: Primary | ICD-10-CM

## 2024-05-06 PROBLEM — Z98.890 POST-OPERATIVE STATE: Status: RESOLVED | Noted: 2024-04-30 | Resolved: 2024-05-06

## 2024-05-06 LAB
PATH REPORT.COMMENTS IMP SPEC: ABNORMAL
PATH REPORT.COMMENTS IMP SPEC: YES
PATH REPORT.FINAL DX SPEC: ABNORMAL
PATH REPORT.GROSS SPEC: ABNORMAL
PATH REPORT.MICROSCOPIC SPEC OTHER STN: ABNORMAL
PATH REPORT.RELEVANT HX SPEC: ABNORMAL
PHOTO IMAGE: ABNORMAL

## 2024-05-06 PROCEDURE — 99213 OFFICE O/P EST LOW 20 MIN: CPT | Mod: 24 | Performed by: INTERNAL MEDICINE

## 2024-05-06 ASSESSMENT — PAIN SCALES - GENERAL: PAINLEVEL: MODERATE PAIN (4)

## 2024-05-06 NOTE — PROGRESS NOTES
Assessment & Plan     (N28.89) Renal mass  (primary encounter diagnosis)  Comment:   Plan: final pathology pending.  Stable post-op.  Wounds intact    (I10) Essential hypertension with goal blood pressure less than 140/90  Comment:   Plan: well controlled                  Subjective   Uri is a 53 year old, presenting for the following health issues:  Hospital F/U        5/6/2024    12:50 PM   Additional Questions   Roomed by meghan   Accompanied by todd         5/6/2024    12:50 PM   Patient Reported Additional Medications   Patient reports taking the following new medications na     [facility]         Hospital Follow-up Visit:    Hospital/Nursing Home/IP Rehab Facility: [facility]  Date of Admission: 4/30/2024  Date of Discharge: 5/1/2024  Reason(s) for Admission: robotic assisted laparoscopic partial nephrectomy   Was the patient in the ICU or did the patient experience delirium during hospitalization?  No  Do you have any other stressors you would like to discuss with your provider? No    Problems taking medications regularly:  None  Medication changes since discharge: None  Problems adhering to non-medication therapy:  None    Summary of hospitalization:  [facility] discharge summary reviewed     Returns for followup of recent partial right nephrectomy for renal mass possible renal cell ca.  Stable post-op.  Pain well controlled  Diagnostic Tests/Treatments reviewed.  Follow up needed: pathology results  Other Healthcare Providers Involved in Patient s Care:         urology  Update since discharge: improved.         Plan of care communicated with patient           Patient Active Problem List   Diagnosis    Prediabetes    Obesity    Hyperlipidemia with target LDL less than 130    Essential hypertension with goal blood pressure less than 140/90    ISH (generalized anxiety disorder)    Bilateral chronic knee pain    Rheumatoid arthritis involving multiple  "sites, unspecified whether rheumatoid factor present (H)     Current Outpatient Medications   Medication Sig Dispense Refill    acetaminophen (TYLENOL) 325 MG tablet Take 2 tablets (650 mg) by mouth every 4 hours as needed for mild pain 100 tablet 0    citalopram (CELEXA) 20 MG tablet Take 1 tablet (20 mg) by mouth daily (Patient taking differently: Take 20 mg by mouth every morning) 90 tablet 3    folic acid (FOLVITE) 1 MG tablet Take 1 tablet (1 mg) by mouth daily 90 tablet 0    losartan-hydrochlorothiazide (HYZAAR) 50-12.5 MG tablet Take 1 tablet by mouth daily (Patient taking differently: Take 1 tablet by mouth every morning) 90 tablet 3    methotrexate 2.5 MG tablet Take 8 tablets (20 mg) by mouth once a week WEDNESDAY'S  8 Tablets 84 tablet 0    oxyCODONE (ROXICODONE) 5 MG tablet Take 1 tablet (5 mg) by mouth every 4 hours as needed for moderate to severe pain 14 tablet 0    senna-docusate (SENOKOT-S/PERICOLACE) 8.6-50 MG tablet Take 1-2 tablets by mouth 2 times daily 45 tablet 1            Objective    /80   Pulse 82   Temp 97.5  F (36.4  C) (Temporal)   Resp 16   Ht 1.803 m (5' 11\")   Wt 103.8 kg (228 lb 12.8 oz)   SpO2 99%   BMI 31.91 kg/m    Body mass index is 31.91 kg/m .  Physical Exam   GENERAL: alert and no distress  ABDOMEN: soft, nontender, no hepatosplenomegaly, no masses and bowel sounds normal  MS: no gross musculoskeletal defects noted, no edema  SKIN: laparoscopy wounds c/d/i  PSYCH: mentation appears normal, affect normal/bright            Signed Electronically by: John Puri MD    "

## 2024-05-08 NOTE — RESULT ENCOUNTER NOTE
I have personally reviewed the pathology results which support a diagnosis of clear-cell renal cell carcinoma.  Sites involved in this diagnosis include: Right kidney      Bossman Glaser MD

## 2024-05-13 PROBLEM — C64.9 CLEAR CELL RENAL CELL CARCINOMA (H): Status: ACTIVE | Noted: 2024-05-13

## 2024-05-24 ENCOUNTER — VIRTUAL VISIT (OUTPATIENT)
Dept: ONCOLOGY | Facility: CLINIC | Age: 54
End: 2024-05-24
Attending: UROLOGY
Payer: COMMERCIAL

## 2024-05-24 VITALS — BODY MASS INDEX: 31.5 KG/M2 | WEIGHT: 225 LBS | HEIGHT: 71 IN

## 2024-05-24 DIAGNOSIS — C64.1 MALIGNANT NEOPLASM OF KIDNEY EXCLUDING RENAL PELVIS, RIGHT (H): Primary | ICD-10-CM

## 2024-05-24 PROCEDURE — 99214 OFFICE O/P EST MOD 30 MIN: CPT | Mod: 24 | Performed by: UROLOGY

## 2024-05-24 ASSESSMENT — PAIN SCALES - GENERAL: PAINLEVEL: NO PAIN (0)

## 2024-05-24 NOTE — LETTER
"    5/24/2024         RE: Geovanni Galvez  [address] [place] [address]        Dear Colleague,    Thank you for referring your patient, Geovanni Galvez, to the [facility]. Please see a copy of my visit note below.    Virtual Visit Details    Type of service:  Video Visit     Originating Location (pt. Location): Home    Distant Location (provider location):  On-site  Platform used for Video Visit: [facility]    Urology Clinic     HPI  Geovanni Galvez is a 53 year old male with history of kidney cancer status post robotic right partial nephrectomy, here for follow-up.      The patient denies any major recovery issues after surgery.  No changes to health, hospitalizations or new diagnoses in the interim    PHYSICAL EXAM  Vitals:  No vitals were obtained today due to virtual visit.    Physical Exam   EYES: Eyes grossly normal to inspection.  No discharge or erythema, or obvious scleral/conjunctival abnormalities.  SKIN: Visible skin clear. No significant rash, abnormal pigmentation or lesions.  NEURO: Cranial nerves grossly intact.  Mentation and speech appropriate for age.  GENERAL: Healthy, alert and no distress  RESP: No audible wheeze, cough, or visible cyanosis.  No visible retractions or increased work of breathing.    PSYCH: Mentation appears normal, affect normal/bright, judgement and insight intact, normal speech and appearance well-groomed.      Labs  Lab Results   Component Value Date    WBC 12.7 05/01/2024    WBC 9.5 02/21/2012     Lab Results   Component Value Date    RBC 4.34 05/01/2024    RBC 5.43 02/21/2012     Lab Results   Component Value Date    HGB 13.4 05/01/2024    HGB 17.1 02/21/2012     Lab Results   Component Value Date    HCT 39.4 05/01/2024    HCT 48.6 02/21/2012     No components found for: \"MCT\"  Lab Results   Component Value Date    MCV 91 05/01/2024    MCV 90 02/21/2012     Lab Results   Component Value Date    MCH 30.9 05/01/2024    " MCH 31.5 02/21/2012     Lab Results   Component Value Date    MCHC 34.0 05/01/2024    MCHC 35.2 02/21/2012     Lab Results   Component Value Date    RDW 13.1 05/01/2024    RDW 13.4 02/21/2012     Lab Results   Component Value Date     05/01/2024     02/21/2012        Last Comprehensive Metabolic Panel:  Sodium   Date Value Ref Range Status   05/01/2024 136 135 - 145 mmol/L Final     Comment:     Reference intervals for this test were updated on 09/26/2023 to more accurately reflect our healthy population. There may be differences in the flagging of prior results with similar values performed with this method. Interpretation of those prior results can be made in the context of the updated reference intervals.    05/14/2019 139 133 - 144 mmol/L Final     Potassium   Date Value Ref Range Status   05/01/2024 4.3 3.4 - 5.3 mmol/L Final   09/23/2022 4.6 3.4 - 5.3 mmol/L Final   05/14/2019 4.6 3.4 - 5.3 mmol/L Final     Chloride   Date Value Ref Range Status   05/01/2024 102 98 - 107 mmol/L Final   09/23/2022 105 94 - 109 mmol/L Final   05/14/2019 104 94 - 109 mmol/L Final     Carbon Dioxide   Date Value Ref Range Status   05/14/2019 28 20 - 32 mmol/L Final     Carbon Dioxide (CO2)   Date Value Ref Range Status   05/01/2024 22 22 - 29 mmol/L Final   09/23/2022 29 20 - 32 mmol/L Final     Anion Gap   Date Value Ref Range Status   05/01/2024 12 7 - 15 mmol/L Final   09/23/2022 5 3 - 14 mmol/L Final   05/14/2019 7 3 - 14 mmol/L Final     Glucose   Date Value Ref Range Status   05/01/2024 185 (H) 70 - 99 mg/dL Final   09/23/2022 118 (H) 70 - 99 mg/dL Final   05/14/2019 101 (H) 70 - 99 mg/dL Final     Comment:     Fasting specimen     Urea Nitrogen   Date Value Ref Range Status   05/01/2024 14.2 6.0 - 20.0 mg/dL Final   09/23/2022 17 7 - 30 mg/dL Final   05/14/2019 17 7 - 30 mg/dL Final     Creatinine   Date Value Ref Range Status   05/01/2024 1.29 (H) 0.67 - 1.17 mg/dL Final   05/14/2019 1.04 0.66 - 1.25 mg/dL  Final     GFR Estimate   Date Value Ref Range Status   05/01/2024 67 >60 mL/min/1.73m2 Final   05/14/2019 85 >60 mL/min/[1.73_m2] Final     Comment:     Non  GFR Calc  Starting 12/18/2018, serum creatinine based estimated GFR (eGFR) will be   calculated using the Chronic Kidney Disease Epidemiology Collaboration   (CKD-EPI) equation.       Calcium   Date Value Ref Range Status   05/01/2024 8.3 (L) 8.6 - 10.0 mg/dL Final   05/14/2019 8.9 8.5 - 10.1 mg/dL Final     Bilirubin Total   Date Value Ref Range Status   04/15/2024 0.4 <=1.2 mg/dL Final   05/14/2019 0.9 0.2 - 1.3 mg/dL Final     Alkaline Phosphatase   Date Value Ref Range Status   04/15/2024 58 40 - 150 U/L Final     Comment:     Reference intervals for this test were updated on 11/14/2023 to more accurately reflect our healthy population. There may be differences in the flagging of prior results with similar values performed with this method. Interpretation of those prior results can be made in the context of the updated reference intervals.   05/14/2019 73 40 - 150 U/L Final     ALT   Date Value Ref Range Status   04/15/2024 38 0 - 70 U/L Final     Comment:     Reference intervals for this test were updated on 6/12/2023 to more accurately reflect our healthy population. There may be differences in the flagging of prior results with similar values performed with this method. Interpretation of those prior results can be made in the context of the updated reference intervals.     05/14/2019 53 0 - 70 U/L Final     AST   Date Value Ref Range Status   04/15/2024 24 0 - 45 U/L Final     Comment:     Reference intervals for this test were updated on 6/12/2023 to more accurately reflect our healthy population. There may be differences in the flagging of prior results with similar values performed with this method. Interpretation of those prior results can be made in the context of the updated reference intervals.   05/14/2019 27 0 - 45 U/L Final  "      Prostate Specific Antigen Screen   Date Value Ref Range Status   11/06/2023 0.60 0.00 - 3.50 ng/mL Final   09/23/2022 0.85 0.00 - 4.00 ug/L Final     Surgical pathology  Final Diagnosis   Kidney, right renal mass, partial nephrectomy:  -Renal cell carcinoma, clear-cell type, histologic grade: 2/4   -Resection margins, negative for carcinoma   Electronically signed by Sophie Bucio MD on 5/6/2024 at  4:18 PM   Comment  [facility]   The carcinoma cells are diffusely positive for CA 9, focally positive for CK7.     Clinical Information  [facility]   Right renal mass   Gross Description  [facility]   A(1). Kidney, Right, Right renal mass:  The specimen is received in formalin with proper patient identification, labeled \"right renal mass\".  The specimen consists of a 1.5 g, 2.1 x 1.5 x 1.1 cm disrupted portion of red-tan soft tissue.  A definitive capsular margin is not identified.  The external surface is inked black.  The specimen is sectioned to reveal a 2.1 x 0.9 x 0.8 cm yellow, moderately well-circumscribed mass which abuts the black inked margin.  Minimal uninvolved red-brown kidney parenchyma is identified.  The specimen is submitted entirely in cassette A1-A2.       Imaging       ASSESSMENT AND PLAN  53 year old male for followup of pT1a cc RCC status post robotic right partial nephrectomy on 4/30/2024 doing well      Plan   Follow-up in 6 months with CT renal mass and BMP prior    30 total minutes spent on the date of the encounter including direct interaction with the patient, performing chart review, documentation and further activities as noted above    Bossman Glaser MD   Department of Urology   [facility]                   Again, thank you for allowing me to participate in the care of your patient.        Sincerely,        Bossman Glaser MD  "

## 2024-05-24 NOTE — PROGRESS NOTES
"Virtual Visit Details    Type of service:  Video Visit     Originating Location (pt. Location): Home    Distant Location (provider location):  On-site  Platform used for Video Visit: [facility]    Urology Clinic     HPI  Geovanni Galvez is a 53 year old male with history of kidney cancer status post robotic right partial nephrectomy, here for follow-up.      The patient denies any major recovery issues after surgery.  No changes to health, hospitalizations or new diagnoses in the interim    PHYSICAL EXAM  Vitals:  No vitals were obtained today due to virtual visit.    Physical Exam   EYES: Eyes grossly normal to inspection.  No discharge or erythema, or obvious scleral/conjunctival abnormalities.  SKIN: Visible skin clear. No significant rash, abnormal pigmentation or lesions.  NEURO: Cranial nerves grossly intact.  Mentation and speech appropriate for age.  GENERAL: Healthy, alert and no distress  RESP: No audible wheeze, cough, or visible cyanosis.  No visible retractions or increased work of breathing.    PSYCH: Mentation appears normal, affect normal/bright, judgement and insight intact, normal speech and appearance well-groomed.      Labs  Lab Results   Component Value Date    WBC 12.7 05/01/2024    WBC 9.5 02/21/2012     Lab Results   Component Value Date    RBC 4.34 05/01/2024    RBC 5.43 02/21/2012     Lab Results   Component Value Date    HGB 13.4 05/01/2024    HGB 17.1 02/21/2012     Lab Results   Component Value Date    HCT 39.4 05/01/2024    HCT 48.6 02/21/2012     No components found for: \"MCT\"  Lab Results   Component Value Date    MCV 91 05/01/2024    MCV 90 02/21/2012     Lab Results   Component Value Date    MCH 30.9 05/01/2024    MCH 31.5 02/21/2012     Lab Results   Component Value Date    MCHC 34.0 05/01/2024    MCHC 35.2 02/21/2012     Lab Results   Component Value Date    RDW 13.1 05/01/2024    RDW 13.4 02/21/2012     Lab Results   Component Value Date     05/01/2024     " 02/21/2012        Last Comprehensive Metabolic Panel:  Sodium   Date Value Ref Range Status   05/01/2024 136 135 - 145 mmol/L Final     Comment:     Reference intervals for this test were updated on 09/26/2023 to more accurately reflect our healthy population. There may be differences in the flagging of prior results with similar values performed with this method. Interpretation of those prior results can be made in the context of the updated reference intervals.    05/14/2019 139 133 - 144 mmol/L Final     Potassium   Date Value Ref Range Status   05/01/2024 4.3 3.4 - 5.3 mmol/L Final   09/23/2022 4.6 3.4 - 5.3 mmol/L Final   05/14/2019 4.6 3.4 - 5.3 mmol/L Final     Chloride   Date Value Ref Range Status   05/01/2024 102 98 - 107 mmol/L Final   09/23/2022 105 94 - 109 mmol/L Final   05/14/2019 104 94 - 109 mmol/L Final     Carbon Dioxide   Date Value Ref Range Status   05/14/2019 28 20 - 32 mmol/L Final     Carbon Dioxide (CO2)   Date Value Ref Range Status   05/01/2024 22 22 - 29 mmol/L Final   09/23/2022 29 20 - 32 mmol/L Final     Anion Gap   Date Value Ref Range Status   05/01/2024 12 7 - 15 mmol/L Final   09/23/2022 5 3 - 14 mmol/L Final   05/14/2019 7 3 - 14 mmol/L Final     Glucose   Date Value Ref Range Status   05/01/2024 185 (H) 70 - 99 mg/dL Final   09/23/2022 118 (H) 70 - 99 mg/dL Final   05/14/2019 101 (H) 70 - 99 mg/dL Final     Comment:     Fasting specimen     Urea Nitrogen   Date Value Ref Range Status   05/01/2024 14.2 6.0 - 20.0 mg/dL Final   09/23/2022 17 7 - 30 mg/dL Final   05/14/2019 17 7 - 30 mg/dL Final     Creatinine   Date Value Ref Range Status   05/01/2024 1.29 (H) 0.67 - 1.17 mg/dL Final   05/14/2019 1.04 0.66 - 1.25 mg/dL Final     GFR Estimate   Date Value Ref Range Status   05/01/2024 67 >60 mL/min/1.73m2 Final   05/14/2019 85 >60 mL/min/[1.73_m2] Final     Comment:     Non  GFR Calc  Starting 12/18/2018, serum creatinine based estimated GFR (eGFR) will be    calculated using the Chronic Kidney Disease Epidemiology Collaboration   (CKD-EPI) equation.       Calcium   Date Value Ref Range Status   05/01/2024 8.3 (L) 8.6 - 10.0 mg/dL Final   05/14/2019 8.9 8.5 - 10.1 mg/dL Final     Bilirubin Total   Date Value Ref Range Status   04/15/2024 0.4 <=1.2 mg/dL Final   05/14/2019 0.9 0.2 - 1.3 mg/dL Final     Alkaline Phosphatase   Date Value Ref Range Status   04/15/2024 58 40 - 150 U/L Final     Comment:     Reference intervals for this test were updated on 11/14/2023 to more accurately reflect our healthy population. There may be differences in the flagging of prior results with similar values performed with this method. Interpretation of those prior results can be made in the context of the updated reference intervals.   05/14/2019 73 40 - 150 U/L Final     ALT   Date Value Ref Range Status   04/15/2024 38 0 - 70 U/L Final     Comment:     Reference intervals for this test were updated on 6/12/2023 to more accurately reflect our healthy population. There may be differences in the flagging of prior results with similar values performed with this method. Interpretation of those prior results can be made in the context of the updated reference intervals.     05/14/2019 53 0 - 70 U/L Final     AST   Date Value Ref Range Status   04/15/2024 24 0 - 45 U/L Final     Comment:     Reference intervals for this test were updated on 6/12/2023 to more accurately reflect our healthy population. There may be differences in the flagging of prior results with similar values performed with this method. Interpretation of those prior results can be made in the context of the updated reference intervals.   05/14/2019 27 0 - 45 U/L Final       Prostate Specific Antigen Screen   Date Value Ref Range Status   11/06/2023 0.60 0.00 - 3.50 ng/mL Final   09/23/2022 0.85 0.00 - 4.00 ug/L Final     Surgical pathology  Final Diagnosis   Kidney, right renal mass, partial nephrectomy:  -Renal cell  "carcinoma, clear-cell type, histologic grade: 2/4   -Resection margins, negative for carcinoma   Electronically signed by Sophie Bucio MD on 5/6/2024 at  4:18 PM   Comment  [facility]   The carcinoma cells are diffusely positive for CA 9, focally positive for CK7.     Clinical Information  [facility]   Right renal mass   Gross Description  [facility]   A(1). Kidney, Right, Right renal mass:  The specimen is received in formalin with proper patient identification, labeled \"right renal mass\".  The specimen consists of a 1.5 g, 2.1 x 1.5 x 1.1 cm disrupted portion of red-tan soft tissue.  A definitive capsular margin is not identified.  The external surface is inked black.  The specimen is sectioned to reveal a 2.1 x 0.9 x 0.8 cm yellow, moderately well-circumscribed mass which abuts the black inked margin.  Minimal uninvolved red-brown kidney parenchyma is identified.  The specimen is submitted entirely in cassette A1-A2.       Imaging       ASSESSMENT AND PLAN  53 year old male for followup of pT1a cc RCC status post robotic right partial nephrectomy on 4/30/2024 doing well      Plan   Follow-up in 6 months with CT renal mass and BMP prior    30 total minutes spent on the date of the encounter including direct interaction with the patient, performing chart review, documentation and further activities as noted above    Bossman Glaser MD   Department of Urology   [facility]                 "

## 2024-05-24 NOTE — NURSING NOTE
Is the patient currently in the state of [place]? YES    Visit mode:VIDEO    If the visit is dropped, the patient can be reconnected by: VIDEO VISIT: Text to cell phone:   Telephone Information:   Mobile [phone]       Will anyone else be joining the visit? NO  (If patient encounters technical issues they should call [phone]    How would you like to obtain your AVS? MyChart    Are changes needed to the allergy or medication list? Pt stated no changes to allergies and Pt stated no med changes    Are refills needed on medications prescribed by this physician? NO    Reason for visit: FAUSTINA Jalloh LPN

## 2024-05-24 NOTE — LETTER
"    5/24/2024         RE: Geovanni Galvez  [address] [place] [address]        Dear Colleague,    Thank you for referring your patient, Geovanni Galvez, to the [facility]. Please see a copy of my visit note below.    Virtual Visit Details    Type of service:  Video Visit     Originating Location (pt. Location): Home    Distant Location (provider location):  On-site  Platform used for Video Visit: [facility]    Urology Clinic     HPI  Geovanni Galvez is a 53 year old male with history of kidney cancer status post robotic right partial nephrectomy, here for follow-up.      The patient denies any major recovery issues after surgery.  No changes to health, hospitalizations or new diagnoses in the interim    PHYSICAL EXAM  Vitals:  No vitals were obtained today due to virtual visit.    Physical Exam   EYES: Eyes grossly normal to inspection.  No discharge or erythema, or obvious scleral/conjunctival abnormalities.  SKIN: Visible skin clear. No significant rash, abnormal pigmentation or lesions.  NEURO: Cranial nerves grossly intact.  Mentation and speech appropriate for age.  GENERAL: Healthy, alert and no distress  RESP: No audible wheeze, cough, or visible cyanosis.  No visible retractions or increased work of breathing.    PSYCH: Mentation appears normal, affect normal/bright, judgement and insight intact, normal speech and appearance well-groomed.      Labs  Lab Results   Component Value Date    WBC 12.7 05/01/2024    WBC 9.5 02/21/2012     Lab Results   Component Value Date    RBC 4.34 05/01/2024    RBC 5.43 02/21/2012     Lab Results   Component Value Date    HGB 13.4 05/01/2024    HGB 17.1 02/21/2012     Lab Results   Component Value Date    HCT 39.4 05/01/2024    HCT 48.6 02/21/2012     No components found for: \"MCT\"  Lab Results   Component Value Date    MCV 91 05/01/2024    MCV 90 02/21/2012     Lab Results   Component Value Date    MCH 30.9 05/01/2024    " MCH 31.5 02/21/2012     Lab Results   Component Value Date    MCHC 34.0 05/01/2024    MCHC 35.2 02/21/2012     Lab Results   Component Value Date    RDW 13.1 05/01/2024    RDW 13.4 02/21/2012     Lab Results   Component Value Date     05/01/2024     02/21/2012        Last Comprehensive Metabolic Panel:  Sodium   Date Value Ref Range Status   05/01/2024 136 135 - 145 mmol/L Final     Comment:     Reference intervals for this test were updated on 09/26/2023 to more accurately reflect our healthy population. There may be differences in the flagging of prior results with similar values performed with this method. Interpretation of those prior results can be made in the context of the updated reference intervals.    05/14/2019 139 133 - 144 mmol/L Final     Potassium   Date Value Ref Range Status   05/01/2024 4.3 3.4 - 5.3 mmol/L Final   09/23/2022 4.6 3.4 - 5.3 mmol/L Final   05/14/2019 4.6 3.4 - 5.3 mmol/L Final     Chloride   Date Value Ref Range Status   05/01/2024 102 98 - 107 mmol/L Final   09/23/2022 105 94 - 109 mmol/L Final   05/14/2019 104 94 - 109 mmol/L Final     Carbon Dioxide   Date Value Ref Range Status   05/14/2019 28 20 - 32 mmol/L Final     Carbon Dioxide (CO2)   Date Value Ref Range Status   05/01/2024 22 22 - 29 mmol/L Final   09/23/2022 29 20 - 32 mmol/L Final     Anion Gap   Date Value Ref Range Status   05/01/2024 12 7 - 15 mmol/L Final   09/23/2022 5 3 - 14 mmol/L Final   05/14/2019 7 3 - 14 mmol/L Final     Glucose   Date Value Ref Range Status   05/01/2024 185 (H) 70 - 99 mg/dL Final   09/23/2022 118 (H) 70 - 99 mg/dL Final   05/14/2019 101 (H) 70 - 99 mg/dL Final     Comment:     Fasting specimen     Urea Nitrogen   Date Value Ref Range Status   05/01/2024 14.2 6.0 - 20.0 mg/dL Final   09/23/2022 17 7 - 30 mg/dL Final   05/14/2019 17 7 - 30 mg/dL Final     Creatinine   Date Value Ref Range Status   05/01/2024 1.29 (H) 0.67 - 1.17 mg/dL Final   05/14/2019 1.04 0.66 - 1.25 mg/dL  Final     GFR Estimate   Date Value Ref Range Status   05/01/2024 67 >60 mL/min/1.73m2 Final   05/14/2019 85 >60 mL/min/[1.73_m2] Final     Comment:     Non  GFR Calc  Starting 12/18/2018, serum creatinine based estimated GFR (eGFR) will be   calculated using the Chronic Kidney Disease Epidemiology Collaboration   (CKD-EPI) equation.       Calcium   Date Value Ref Range Status   05/01/2024 8.3 (L) 8.6 - 10.0 mg/dL Final   05/14/2019 8.9 8.5 - 10.1 mg/dL Final     Bilirubin Total   Date Value Ref Range Status   04/15/2024 0.4 <=1.2 mg/dL Final   05/14/2019 0.9 0.2 - 1.3 mg/dL Final     Alkaline Phosphatase   Date Value Ref Range Status   04/15/2024 58 40 - 150 U/L Final     Comment:     Reference intervals for this test were updated on 11/14/2023 to more accurately reflect our healthy population. There may be differences in the flagging of prior results with similar values performed with this method. Interpretation of those prior results can be made in the context of the updated reference intervals.   05/14/2019 73 40 - 150 U/L Final     ALT   Date Value Ref Range Status   04/15/2024 38 0 - 70 U/L Final     Comment:     Reference intervals for this test were updated on 6/12/2023 to more accurately reflect our healthy population. There may be differences in the flagging of prior results with similar values performed with this method. Interpretation of those prior results can be made in the context of the updated reference intervals.     05/14/2019 53 0 - 70 U/L Final     AST   Date Value Ref Range Status   04/15/2024 24 0 - 45 U/L Final     Comment:     Reference intervals for this test were updated on 6/12/2023 to more accurately reflect our healthy population. There may be differences in the flagging of prior results with similar values performed with this method. Interpretation of those prior results can be made in the context of the updated reference intervals.   05/14/2019 27 0 - 45 U/L Final  "      Prostate Specific Antigen Screen   Date Value Ref Range Status   11/06/2023 0.60 0.00 - 3.50 ng/mL Final   09/23/2022 0.85 0.00 - 4.00 ug/L Final     Surgical pathology  Final Diagnosis   Kidney, right renal mass, partial nephrectomy:  -Renal cell carcinoma, clear-cell type, histologic grade: 2/4   -Resection margins, negative for carcinoma   Electronically signed by Sophie Bucio MD on 5/6/2024 at  4:18 PM   Comment  [facility]   The carcinoma cells are diffusely positive for CA 9, focally positive for CK7.     Clinical Information  [facility]   Right renal mass   Gross Description  [facility]   A(1). Kidney, Right, Right renal mass:  The specimen is received in formalin with proper patient identification, labeled \"right renal mass\".  The specimen consists of a 1.5 g, 2.1 x 1.5 x 1.1 cm disrupted portion of red-tan soft tissue.  A definitive capsular margin is not identified.  The external surface is inked black.  The specimen is sectioned to reveal a 2.1 x 0.9 x 0.8 cm yellow, moderately well-circumscribed mass which abuts the black inked margin.  Minimal uninvolved red-brown kidney parenchyma is identified.  The specimen is submitted entirely in cassette A1-A2.       Imaging       ASSESSMENT AND PLAN  53 year old male for followup of pT1a cc RCC status post robotic right partial nephrectomy on 4/30/2024 doing well      Plan   Follow-up in 6 months with CT renal mass and BMP prior    30 total minutes spent on the date of the encounter including direct interaction with the patient, performing chart review, documentation and further activities as noted above    Bossman Glaser MD   Department of Urology   [facility]                   Again, thank you for allowing me to participate in the care of your patient.        Sincerely,        Bossman Glaser MD  "

## 2024-06-05 ENCOUNTER — LAB (OUTPATIENT)
Dept: LAB | Facility: CLINIC | Age: 54
End: 2024-06-05
Payer: COMMERCIAL

## 2024-06-05 ENCOUNTER — TELEPHONE (OUTPATIENT)
Dept: RHEUMATOLOGY | Facility: CLINIC | Age: 54
End: 2024-06-05

## 2024-06-05 ENCOUNTER — VIRTUAL VISIT (OUTPATIENT)
Dept: RHEUMATOLOGY | Facility: CLINIC | Age: 54
End: 2024-06-05
Payer: COMMERCIAL

## 2024-06-05 VITALS — BODY MASS INDEX: 31.5 KG/M2 | WEIGHT: 225 LBS | HEIGHT: 71 IN

## 2024-06-05 DIAGNOSIS — M05.9 SEROPOSITIVE RHEUMATOID ARTHRITIS (H): ICD-10-CM

## 2024-06-05 DIAGNOSIS — C64.1 MALIGNANT NEOPLASM OF KIDNEY EXCLUDING RENAL PELVIS, RIGHT (H): ICD-10-CM

## 2024-06-05 DIAGNOSIS — R76.8 RHEUMATOID FACTOR POSITIVE: ICD-10-CM

## 2024-06-05 DIAGNOSIS — R76.8 POSITIVE ANA (ANTINUCLEAR ANTIBODY): ICD-10-CM

## 2024-06-05 DIAGNOSIS — Z79.899 LONG-TERM USE OF HIGH-RISK MEDICATION: ICD-10-CM

## 2024-06-05 DIAGNOSIS — M05.9 SEROPOSITIVE RHEUMATOID ARTHRITIS (H): Primary | ICD-10-CM

## 2024-06-05 DIAGNOSIS — C64.1 CLEAR CELL CARCINOMA OF RIGHT KIDNEY (H): ICD-10-CM

## 2024-06-05 DIAGNOSIS — R76.8 CYCLIC CITRULLINATED PEPTIDE (CCP) ANTIBODY POSITIVE: Primary | ICD-10-CM

## 2024-06-05 PROCEDURE — 80048 BASIC METABOLIC PNL TOTAL CA: CPT

## 2024-06-05 PROCEDURE — 99214 OFFICE O/P EST MOD 30 MIN: CPT | Mod: 24 | Performed by: INTERNAL MEDICINE

## 2024-06-05 PROCEDURE — G2211 COMPLEX E/M VISIT ADD ON: HCPCS | Performed by: INTERNAL MEDICINE

## 2024-06-05 PROCEDURE — 86140 C-REACTIVE PROTEIN: CPT

## 2024-06-05 PROCEDURE — 36415 COLL VENOUS BLD VENIPUNCTURE: CPT

## 2024-06-05 RX ORDER — METHOTREXATE 2.5 MG/1
20 TABLET ORAL WEEKLY
Qty: 84 TABLET | Refills: 0 | Status: SHIPPED | OUTPATIENT
Start: 2024-06-05 | End: 2024-09-09

## 2024-06-05 ASSESSMENT — PAIN SCALES - GENERAL: PAINLEVEL: NO PAIN (0)

## 2024-06-05 NOTE — PROGRESS NOTES
"Virtual Visit Details    Type of service:  Video Visit     Originating Location (pt. Location): {video visit patient location:322052::\"Home\"}  {PROVIDER LOCATION On-site should be selected for visits conducted from your clinic location or adjoining [facility] hospital, academic office, or other nearby [facility] building. Off-site should be selected for all other provider locations, including home:[id number]}  Distant Location (provider location):  {virtual location provider:[id number]}  Platform used for Video Visit: {Virtual Visit Platforms:[id number]::\""[employer]\"}  "

## 2024-06-05 NOTE — TELEPHONE ENCOUNTER
Dear :     I saw our mutual patient today.  I just wanted to check with you if you are okay with me continuing methotrexate given his newly diagnosed renal cancer.  Also would it be okay if I consider using a TNF inhibitor such as adalimumab in future if his rheumatoid arthritis read to progress?  Thank you so much.  I look forward to hearing your thoughts on his case.

## 2024-06-05 NOTE — NURSING NOTE
Is the patient currently in the state of [place]? YES    Visit mode:VIDEO    If the visit is dropped, the patient can be reconnected by: VIDEO VISIT: Text to cell phone:   Telephone Information:   Mobile [phone]       Will anyone else be joining the visit? NO  (If patient encounters technical issues they should call [phone])    How would you like to obtain your AVS? MyChart    Are changes needed to the allergy or medication list? No    Are refills needed on medications prescribed by this physician? YES    Reason for visit: FAUSTINA RODRIGUEZ

## 2024-06-05 NOTE — PROGRESS NOTES
TELE HEALTH NOTE- RHEUMATOLOGY    Verbal consent obtained from patient for telehealth services provided below.  Communication with patient was conducted via [employer].  Location of Patient: Home  Location of Provider: [facility]  I spent over 50% of the visit devoted to coordination of care and counseling with the patient, as documented under PLANS below.  Date of Service: 6/5/2024  Geovanni RIVERA Lenny       CC: Follow up for rheumatoid arthritis    HPI:    Patient is a 53 year old White male presenting for Telemedicine today as a follow-up patient. Currently he is on methotrexate 20 mg/week along with folic acid 2 mg daily.  He was diagnosed with RCC and is status post robotic right partial nephrectomy on 4/30. He follows up with  in urology. He says his hands feel 99% good. He is able to make a full fist. He said his fingers feel fat some days of the week. 5/7 are good days in a week.     HISTORY:    Past Medical History:   Diagnosis Date    Hypertension        Past Surgical History:   Procedure Laterality Date    COLONOSCOPY N/A 11/17/2022    Procedure: COLONOSCOPY;  Surgeon: John Blank MD;  Location:  GI    DAVINCI NEPHRECTOMY PARTIAL Right 4/30/2024    Procedure: Robot assisted Laparoscopic Partial  NEPHRECTOMY. ULTRASOUND OF RENAL MASS;  Surgeon: Bossman Glaser MD;  Location: [facility] OR       Family History   Problem Relation Age of Onset    Alcohol/Drug Father     Cancer Father         pancreatic    Colon Cancer No family hx of        History   Smoking Status    Never   Smokeless Tobacco    Never       Social History    Substance and Sexual Activity      Alcohol use: Not Currently        Comment: last drink 2014      History   Drug Use No       Social History     Social History Narrative    Not on file       Current Outpatient Medications   Medication Sig Dispense Refill    citalopram (CELEXA) 20 MG tablet Take 1 tablet (20 mg) by mouth daily (Patient taking differently: Take 20 mg by mouth  every morning) 90 tablet 3    folic acid (FOLVITE) 1 MG tablet Take 1 tablet (1 mg) by mouth daily 90 tablet 0    losartan-hydrochlorothiazide (HYZAAR) 50-12.5 MG tablet Take 1 tablet by mouth daily (Patient taking differently: Take 1 tablet by mouth every morning) 90 tablet 3    methotrexate 2.5 MG tablet Take 8 tablets (20 mg) by mouth once a week WEDNESDAY'S  8 Tablets 84 tablet 0    acetaminophen (TYLENOL) 325 MG tablet Take 2 tablets (650 mg) by mouth every 4 hours as needed for mild pain 100 tablet 0    oxyCODONE (ROXICODONE) 5 MG tablet Take 1 tablet (5 mg) by mouth every 4 hours as needed for moderate to severe pain 14 tablet 0    senna-docusate (SENOKOT-S/PERICOLACE) 8.6-50 MG tablet Take 1-2 tablets by mouth 2 times daily 45 tablet 1     Labs     April - May 2024  CBC - Wbc elevated at 12.7, Hb and plt normal   BMP - elevated creatinine 1.29, calcium low at 8.3  LFT - normal   Cr cl - 95.8    TELE HEALTH EXAM:    The patient sounded Alert and oriented  General: Alert, No apparent distress   Psych: Affect euthymic  Skin: No rashes noted  Neck: No visible swelling  Respiratory: Breathing appears normal  Fist forming 100%    1. Seropositive rheumatoid arthritis (H)    2. Cyclic citrullinated peptide (CCP) antibody positive    3. Rheumatoid factor positive    4. Positive SAIRA (antinuclear antibody)    5. Long-term use of high-risk medication    6. Clear cell RCC    (M25.50) Polyarthralgia  (primary encounter diagnosis)  (R76.8) Rheumatoid factor positive  (R76.8) Cyclic citrullinated peptide (CCP) antibody positive  Comment: RF-90, CCP-12. X ray hands - b/l [facility] arthrosis.  Initial symptoms included pain and swelling of hands.X ray without changes of inflammatory arthropathy.     Started on SSZ in Nov 2023.  Discontinued later due to GI upset.Will avoid plaquenil as he may likely need more than that to control disease activity and patient also prefers to avoid HCQ after side effects were explained. Currently on  methotrexate 20 mg/week and folic acid 1 mg daily.  He is able to make 100% fist bilaterally and it does appear that his RA is currently in remission.    Plan:   Continue methotrexate 20 mg/week and folic acid 1 mg daily. Refills provided.   Will check BMP. If creatinine continues to be elevated, may consider switching to Humira.   Will message  as well.     (R76.8) Positive SAIRA (antinuclear antibody)  Comment: noted, 1:40, speckled. No clinical features of CTD.   Plan: check additional labs as below.    Long term use of high risk medication   Comment : methotrexate  Plan : The adverse reactions of MTX was discussed which includes cytopenia, immunosuppression, infection, oversedation, pneumonitis, dizziness, nausea, stomach upset, risk of falls, seizures, and derangements in LFTs. Recommended against use of alcohol while on MTX. pt counseled on the adverse effects of Methotrexate including teratogenicity and need for reliable contraception, Alopecia, infection, liver dysfunction and myelosuppression, including the importance of taking daily Folic acid. Patient verbalized understanding and agrees to proceed.    RCC   Comment : s/p right partial nephrectomy in 2024  Plan : per urology       Rtc in 3-4 months.     After visit summary (AVS ) documentation will be available through [facility] for this encounter.     My diagnostic impression and treatment plans were discussed at length with the patient.  All side effects as well as drug-drug interactions and risks discussed at length.    Vero Santiago MD

## 2024-06-06 ENCOUNTER — TELEPHONE (OUTPATIENT)
Dept: RHEUMATOLOGY | Facility: CLINIC | Age: 54
End: 2024-06-06
Payer: COMMERCIAL

## 2024-06-06 LAB
ANION GAP SERPL CALCULATED.3IONS-SCNC: 14 MMOL/L (ref 7–15)
BUN SERPL-MCNC: 15.8 MG/DL (ref 6–20)
CALCIUM SERPL-MCNC: 9.5 MG/DL (ref 8.6–10)
CHLORIDE SERPL-SCNC: 98 MMOL/L (ref 98–107)
CREAT SERPL-MCNC: 1.33 MG/DL (ref 0.67–1.17)
CRP SERPL-MCNC: 4.27 MG/L
DEPRECATED HCO3 PLAS-SCNC: 26 MMOL/L (ref 22–29)
EGFRCR SERPLBLD CKD-EPI 2021: 64 ML/MIN/1.73M2
GLUCOSE SERPL-MCNC: 73 MG/DL (ref 70–99)
POTASSIUM SERPL-SCNC: 4.2 MMOL/L (ref 3.4–5.3)
SODIUM SERPL-SCNC: 138 MMOL/L (ref 135–145)

## 2024-06-06 NOTE — TELEPHONE ENCOUNTER
LVM for PT to call [phone] to schedule f/u appt with Dr Santiago. Please offer virtual at [place] with provider. Sept

## 2024-06-10 ENCOUNTER — MYC MEDICAL ADVICE (OUTPATIENT)
Dept: RHEUMATOLOGY | Facility: CLINIC | Age: 54
End: 2024-06-10
Payer: COMMERCIAL

## 2024-06-14 DIAGNOSIS — Z79.899 LONG-TERM USE OF HIGH-RISK MEDICATION: Primary | ICD-10-CM

## 2024-06-17 ENCOUNTER — TELEPHONE (OUTPATIENT)
Dept: RHEUMATOLOGY | Facility: CLINIC | Age: 54
End: 2024-06-17
Payer: COMMERCIAL

## 2024-06-17 NOTE — TELEPHONE ENCOUNTER
MTM appointment no showed/canceled, we made one more attempt to reschedule. Patient called and said he's currently not taking humira yet and to hold off on scheduling an appt. He will call back to schedule once needed    Routing back to referring provider and MTM Pharmacist Team        Alba Vitale  [facility]

## 2024-06-19 ENCOUNTER — ANCILLARY PROCEDURE (OUTPATIENT)
Dept: CT IMAGING | Facility: CLINIC | Age: 54
End: 2024-06-19
Attending: UROLOGY
Payer: COMMERCIAL

## 2024-06-19 DIAGNOSIS — C64.1 MALIGNANT NEOPLASM OF KIDNEY EXCLUDING RENAL PELVIS, RIGHT (H): ICD-10-CM

## 2024-06-19 PROCEDURE — 250N000009 HC RX 250: Performed by: UROLOGY

## 2024-06-19 PROCEDURE — 74178 CT ABD&PLV WO CNTR FLWD CNTR: CPT

## 2024-06-19 PROCEDURE — 250N000011 HC RX IP 250 OP 636: Performed by: UROLOGY

## 2024-06-19 RX ORDER — IOPAMIDOL 755 MG/ML
90 INJECTION, SOLUTION INTRAVASCULAR ONCE
Status: COMPLETED | OUTPATIENT
Start: 2024-06-19 | End: 2024-06-19

## 2024-06-19 RX ADMIN — SODIUM CHLORIDE 50 ML: 9 INJECTION, SOLUTION INTRAVENOUS at 14:47

## 2024-06-19 RX ADMIN — IOPAMIDOL 90 ML: 755 INJECTION, SOLUTION INTRAVENOUS at 14:47

## 2024-06-21 ENCOUNTER — VIRTUAL VISIT (OUTPATIENT)
Dept: ONCOLOGY | Facility: CLINIC | Age: 54
End: 2024-06-21
Attending: UROLOGY
Payer: COMMERCIAL

## 2024-06-21 ENCOUNTER — MYC MEDICAL ADVICE (OUTPATIENT)
Dept: PEDIATRICS | Facility: CLINIC | Age: 54
End: 2024-06-21

## 2024-06-21 VITALS — HEIGHT: 71 IN | WEIGHT: 225 LBS | BODY MASS INDEX: 31.5 KG/M2

## 2024-06-21 DIAGNOSIS — N28.0 RENAL INFARCT (H): Primary | ICD-10-CM

## 2024-06-21 DIAGNOSIS — C64.1 MALIGNANT NEOPLASM OF KIDNEY EXCLUDING RENAL PELVIS, RIGHT (H): ICD-10-CM

## 2024-06-21 PROCEDURE — 99024 POSTOP FOLLOW-UP VISIT: CPT | Mod: 95 | Performed by: UROLOGY

## 2024-06-21 ASSESSMENT — PAIN SCALES - GENERAL: PAINLEVEL: NO PAIN (0)

## 2024-06-21 NOTE — NURSING NOTE
Is the patient currently in the state of [place]? YES    Visit mode:VIDEO    If the visit is dropped, the patient can be reconnected by: VIDEO VISIT: Text to cell phone:   Telephone Information:   Mobile [phone]       Will anyone else be joining the visit? NO  (If patient encounters technical issues they should call [phone])    How would you like to obtain your AVS? MyChart    Are changes needed to the allergy or medication list? Pt stated no changes to allergies and Pt stated no med changes    Are refills needed on medications prescribed by this physician? NO    Reason for visit: FAUSTINA RODRIGUEZ

## 2024-06-21 NOTE — LETTER
6/21/2024      Geovanni Galvez  [address] [place] [address]      Dear Colleague,    Thank you for referring your patient, Geovanni Galvez, to the [facility]. Please see a copy of my visit note below.    Virtual Visit Details    Type of service:  Video Visit     Originating Location (pt. Location): Home    Distant Location (provider location):  On-site  Platform used for Video Visit: [facility]      Urology Clinic     HPI  Geovanni Galvez is a 53 year old male with history of kidney cancer status post right robotic partial nephrectomy on 4/30/2024, here for follow-up.      There has been consistent rise in his creatinine postoperatively therefore cross-sectional imaging was obtained which unfortunately showed hypoperfusion/infarction of the lower pole of the right kidney.  He has been having intermittent right upper abdominal discomfort for the past few weeks.    PHYSICAL EXAM  Vitals:  No vitals were obtained today due to virtual visit.    Physical Exam   EYES: Eyes grossly normal to inspection.  No discharge or erythema, or obvious scleral/conjunctival abnormalities.  SKIN: Visible skin clear. No significant rash, abnormal pigmentation or lesions.  NEURO: Cranial nerves grossly intact.  Mentation and speech appropriate for age.  GENERAL: Healthy, alert and no distress  RESP: No audible wheeze, cough, or visible cyanosis.  No visible retractions or increased work of breathing.    PSYCH: Mentation appears normal, affect normal/bright, judgement and insight intact, normal speech and appearance well-groomed.    Labs  Lab Results   Component Value Date    WBC 12.7 05/01/2024    WBC 9.5 02/21/2012     Lab Results   Component Value Date    RBC 4.34 05/01/2024    RBC 5.43 02/21/2012     Lab Results   Component Value Date    HGB 13.4 05/01/2024    HGB 17.1 02/21/2012     Lab Results   Component Value Date    HCT 39.4 05/01/2024    HCT 48.6 02/21/2012     No components  "found for: \"MCT\"  Lab Results   Component Value Date    MCV 91 05/01/2024    MCV 90 02/21/2012     Lab Results   Component Value Date    MCH 30.9 05/01/2024    MCH 31.5 02/21/2012     Lab Results   Component Value Date    MCHC 34.0 05/01/2024    MCHC 35.2 02/21/2012     Lab Results   Component Value Date    RDW 13.1 05/01/2024    RDW 13.4 02/21/2012     Lab Results   Component Value Date     05/01/2024     02/21/2012        Last Comprehensive Metabolic Panel:  Sodium   Date Value Ref Range Status   06/05/2024 138 135 - 145 mmol/L Final     Comment:     Reference intervals for this test were updated on 09/26/2023 to more accurately reflect our healthy population. There may be differences in the flagging of prior results with similar values performed with this method. Interpretation of those prior results can be made in the context of the updated reference intervals.    05/14/2019 139 133 - 144 mmol/L Final     Potassium   Date Value Ref Range Status   06/05/2024 4.2 3.4 - 5.3 mmol/L Final   09/23/2022 4.6 3.4 - 5.3 mmol/L Final   05/14/2019 4.6 3.4 - 5.3 mmol/L Final     Chloride   Date Value Ref Range Status   06/05/2024 98 98 - 107 mmol/L Final   09/23/2022 105 94 - 109 mmol/L Final   05/14/2019 104 94 - 109 mmol/L Final     Carbon Dioxide   Date Value Ref Range Status   05/14/2019 28 20 - 32 mmol/L Final     Carbon Dioxide (CO2)   Date Value Ref Range Status   06/05/2024 26 22 - 29 mmol/L Final   09/23/2022 29 20 - 32 mmol/L Final     Anion Gap   Date Value Ref Range Status   06/05/2024 14 7 - 15 mmol/L Final   09/23/2022 5 3 - 14 mmol/L Final   05/14/2019 7 3 - 14 mmol/L Final     Glucose   Date Value Ref Range Status   06/05/2024 73 70 - 99 mg/dL Final   09/23/2022 118 (H) 70 - 99 mg/dL Final   05/14/2019 101 (H) 70 - 99 mg/dL Final     Comment:     Fasting specimen     Urea Nitrogen   Date Value Ref Range Status   06/05/2024 15.8 6.0 - 20.0 mg/dL Final   09/23/2022 17 7 - 30 mg/dL Final "   05/14/2019 17 7 - 30 mg/dL Final     Creatinine   Date Value Ref Range Status   06/05/2024 1.33 (H) 0.67 - 1.17 mg/dL Final   05/14/2019 1.04 0.66 - 1.25 mg/dL Final     GFR Estimate   Date Value Ref Range Status   06/05/2024 64 >60 mL/min/1.73m2 Final   05/14/2019 85 >60 mL/min/[1.73_m2] Final     Comment:     Non  GFR Calc  Starting 12/18/2018, serum creatinine based estimated GFR (eGFR) will be   calculated using the Chronic Kidney Disease Epidemiology Collaboration   (CKD-EPI) equation.       Calcium   Date Value Ref Range Status   06/05/2024 9.5 8.6 - 10.0 mg/dL Final   05/14/2019 8.9 8.5 - 10.1 mg/dL Final     Bilirubin Total   Date Value Ref Range Status   04/15/2024 0.4 <=1.2 mg/dL Final   05/14/2019 0.9 0.2 - 1.3 mg/dL Final     Alkaline Phosphatase   Date Value Ref Range Status   04/15/2024 58 40 - 150 U/L Final     Comment:     Reference intervals for this test were updated on 11/14/2023 to more accurately reflect our healthy population. There may be differences in the flagging of prior results with similar values performed with this method. Interpretation of those prior results can be made in the context of the updated reference intervals.   05/14/2019 73 40 - 150 U/L Final     ALT   Date Value Ref Range Status   04/15/2024 38 0 - 70 U/L Final     Comment:     Reference intervals for this test were updated on 6/12/2023 to more accurately reflect our healthy population. There may be differences in the flagging of prior results with similar values performed with this method. Interpretation of those prior results can be made in the context of the updated reference intervals.     05/14/2019 53 0 - 70 U/L Final     AST   Date Value Ref Range Status   04/15/2024 24 0 - 45 U/L Final     Comment:     Reference intervals for this test were updated on 6/12/2023 to more accurately reflect our healthy population. There may be differences in the flagging of prior results with similar values  performed with this method. Interpretation of those prior results can be made in the context of the updated reference intervals.   05/14/2019 27 0 - 45 U/L Final       Prostate Specific Antigen Screen   Date Value Ref Range Status   11/06/2023 0.60 0.00 - 3.50 ng/mL Final   09/23/2022 0.85 0.00 - 4.00 ug/L Final          Imaging   FINDINGS:     LOWER CHEST: Normal.     HEPATOBILIARY: Normal.     PANCREAS: Normal.     SPLEEN: Normal.     ADRENAL GLANDS: Normal.     RIGHT KIDNEY/URETER: Interval partial nephrectomy with small postoperative collection along the surgical bed. No suspicious nodular enhancement to suggest local recurrence. There are sites of evolving fat necrosis within the perirenal fat. New   hypoenhancement and parenchymal thinning of the lower pole, suggesting subacute to chronic infarction. No suspicious parenchymal or urothelial lesion in the upper collecting system. No stone or hydronephrosis.     LEFT KIDNEY/URETER: No suspicious parenchymal or urothelial lesion in the upper collecting system. No stone or hydronephrosis.     BOWEL: Normal.     LYMPH NODES: Normal.     VASCULATURE: Normal.     MUSCULOSKELETAL: No aggressive osseous lesion.                                                                      IMPRESSION:  1.  Interval partial right nephrectomy with small postoperative collection. No evidence of local recurrence or metastatic abdominal disease.  2.  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       ASSESSMENT AND PLAN  53 year old male with history of CC RCC status post robotic left partial nephrectomy on 4/30/2024 with a slowly rising creatinine with radiologic evidence of right renal infarct involving the lower pole of the right kidney.  I explained to them that this is a rare incidence and is probably due to a small blockage in the branch of the artery feeding the lower pole of the right kidney.  Since he is mostly asymptomatic, I would  recommend observation at this point.  She understands that his GFR could become slightly worse but I do not anticipate any further deterioration of his renal function as the infarct area seems pretty well demarcated.    Plan   Follow-up in November with CT renal mass and BMP prior 30    30 total minutes spent on the date of the encounter including direct interaction with the patient, performing chart review, documentation and further activities as noted above    Bossman Glaser MD   Department of Urology   [facility]                   Again, thank you for allowing me to participate in the care of your patient.        Sincerely,        Bossman Glaser MD

## 2024-06-21 NOTE — PROGRESS NOTES
"Virtual Visit Details    Type of service:  Video Visit     Originating Location (pt. Location): Home    Distant Location (provider location):  On-site  Platform used for Video Visit: [facility]      Urology Clinic     HPI  Geovanni Galvez is a 53 year old male with history of kidney cancer status post right robotic partial nephrectomy on 4/30/2024, here for follow-up.      There has been consistent rise in his creatinine postoperatively therefore cross-sectional imaging was obtained which unfortunately showed hypoperfusion/infarction of the lower pole of the right kidney.  He has been having intermittent right upper abdominal discomfort for the past few weeks.    PHYSICAL EXAM  Vitals:  No vitals were obtained today due to virtual visit.    Physical Exam   EYES: Eyes grossly normal to inspection.  No discharge or erythema, or obvious scleral/conjunctival abnormalities.  SKIN: Visible skin clear. No significant rash, abnormal pigmentation or lesions.  NEURO: Cranial nerves grossly intact.  Mentation and speech appropriate for age.  GENERAL: Healthy, alert and no distress  RESP: No audible wheeze, cough, or visible cyanosis.  No visible retractions or increased work of breathing.    PSYCH: Mentation appears normal, affect normal/bright, judgement and insight intact, normal speech and appearance well-groomed.    Labs  Lab Results   Component Value Date    WBC 12.7 05/01/2024    WBC 9.5 02/21/2012     Lab Results   Component Value Date    RBC 4.34 05/01/2024    RBC 5.43 02/21/2012     Lab Results   Component Value Date    HGB 13.4 05/01/2024    HGB 17.1 02/21/2012     Lab Results   Component Value Date    HCT 39.4 05/01/2024    HCT 48.6 02/21/2012     No components found for: \"MCT\"  Lab Results   Component Value Date    MCV 91 05/01/2024    MCV 90 02/21/2012     Lab Results   Component Value Date    MCH 30.9 05/01/2024    MCH 31.5 02/21/2012     Lab Results   Component Value Date    MCHC 34.0 05/01/2024    MCHC 35.2 " 02/21/2012     Lab Results   Component Value Date    RDW 13.1 05/01/2024    RDW 13.4 02/21/2012     Lab Results   Component Value Date     05/01/2024     02/21/2012        Last Comprehensive Metabolic Panel:  Sodium   Date Value Ref Range Status   06/05/2024 138 135 - 145 mmol/L Final     Comment:     Reference intervals for this test were updated on 09/26/2023 to more accurately reflect our healthy population. There may be differences in the flagging of prior results with similar values performed with this method. Interpretation of those prior results can be made in the context of the updated reference intervals.    05/14/2019 139 133 - 144 mmol/L Final     Potassium   Date Value Ref Range Status   06/05/2024 4.2 3.4 - 5.3 mmol/L Final   09/23/2022 4.6 3.4 - 5.3 mmol/L Final   05/14/2019 4.6 3.4 - 5.3 mmol/L Final     Chloride   Date Value Ref Range Status   06/05/2024 98 98 - 107 mmol/L Final   09/23/2022 105 94 - 109 mmol/L Final   05/14/2019 104 94 - 109 mmol/L Final     Carbon Dioxide   Date Value Ref Range Status   05/14/2019 28 20 - 32 mmol/L Final     Carbon Dioxide (CO2)   Date Value Ref Range Status   06/05/2024 26 22 - 29 mmol/L Final   09/23/2022 29 20 - 32 mmol/L Final     Anion Gap   Date Value Ref Range Status   06/05/2024 14 7 - 15 mmol/L Final   09/23/2022 5 3 - 14 mmol/L Final   05/14/2019 7 3 - 14 mmol/L Final     Glucose   Date Value Ref Range Status   06/05/2024 73 70 - 99 mg/dL Final   09/23/2022 118 (H) 70 - 99 mg/dL Final   05/14/2019 101 (H) 70 - 99 mg/dL Final     Comment:     Fasting specimen     Urea Nitrogen   Date Value Ref Range Status   06/05/2024 15.8 6.0 - 20.0 mg/dL Final   09/23/2022 17 7 - 30 mg/dL Final   05/14/2019 17 7 - 30 mg/dL Final     Creatinine   Date Value Ref Range Status   06/05/2024 1.33 (H) 0.67 - 1.17 mg/dL Final   05/14/2019 1.04 0.66 - 1.25 mg/dL Final     GFR Estimate   Date Value Ref Range Status   06/05/2024 64 >60 mL/min/1.73m2 Final    05/14/2019 85 >60 mL/min/[1.73_m2] Final     Comment:     Non  GFR Calc  Starting 12/18/2018, serum creatinine based estimated GFR (eGFR) will be   calculated using the Chronic Kidney Disease Epidemiology Collaboration   (CKD-EPI) equation.       Calcium   Date Value Ref Range Status   06/05/2024 9.5 8.6 - 10.0 mg/dL Final   05/14/2019 8.9 8.5 - 10.1 mg/dL Final     Bilirubin Total   Date Value Ref Range Status   04/15/2024 0.4 <=1.2 mg/dL Final   05/14/2019 0.9 0.2 - 1.3 mg/dL Final     Alkaline Phosphatase   Date Value Ref Range Status   04/15/2024 58 40 - 150 U/L Final     Comment:     Reference intervals for this test were updated on 11/14/2023 to more accurately reflect our healthy population. There may be differences in the flagging of prior results with similar values performed with this method. Interpretation of those prior results can be made in the context of the updated reference intervals.   05/14/2019 73 40 - 150 U/L Final     ALT   Date Value Ref Range Status   04/15/2024 38 0 - 70 U/L Final     Comment:     Reference intervals for this test were updated on 6/12/2023 to more accurately reflect our healthy population. There may be differences in the flagging of prior results with similar values performed with this method. Interpretation of those prior results can be made in the context of the updated reference intervals.     05/14/2019 53 0 - 70 U/L Final     AST   Date Value Ref Range Status   04/15/2024 24 0 - 45 U/L Final     Comment:     Reference intervals for this test were updated on 6/12/2023 to more accurately reflect our healthy population. There may be differences in the flagging of prior results with similar values performed with this method. Interpretation of those prior results can be made in the context of the updated reference intervals.   05/14/2019 27 0 - 45 U/L Final       Prostate Specific Antigen Screen   Date Value Ref Range Status   11/06/2023 0.60 0.00 - 3.50  ng/mL Final   09/23/2022 0.85 0.00 - 4.00 ug/L Final          Imaging   FINDINGS:     LOWER CHEST: Normal.     HEPATOBILIARY: Normal.     PANCREAS: Normal.     SPLEEN: Normal.     ADRENAL GLANDS: Normal.     RIGHT KIDNEY/URETER: Interval partial nephrectomy with small postoperative collection along the surgical bed. No suspicious nodular enhancement to suggest local recurrence. There are sites of evolving fat necrosis within the perirenal fat. New   hypoenhancement and parenchymal thinning of the lower pole, suggesting subacute to chronic infarction. No suspicious parenchymal or urothelial lesion in the upper collecting system. No stone or hydronephrosis.     LEFT KIDNEY/URETER: No suspicious parenchymal or urothelial lesion in the upper collecting system. No stone or hydronephrosis.     BOWEL: Normal.     LYMPH NODES: Normal.     VASCULATURE: Normal.     MUSCULOSKELETAL: No aggressive osseous lesion.                                                                      IMPRESSION:  1.  Interval partial right nephrectomy with small postoperative collection. No evidence of local recurrence or metastatic abdominal disease.  2.  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       ASSESSMENT AND PLAN  53 year old male with history of CC RCC status post robotic left partial nephrectomy on 4/30/2024 with a slowly rising creatinine with radiologic evidence of right renal infarct involving the lower pole of the right kidney.  I explained to them that this is a rare incidence and is probably due to a small blockage in the branch of the artery feeding the lower pole of the right kidney.  Since he is mostly asymptomatic, I would recommend observation at this point.  She understands that his GFR could become slightly worse but I do not anticipate any further deterioration of his renal function as the infarct area seems pretty well demarcated.    Plan   Follow-up in November with CT renal  mass and BMP prior 30    30 total minutes spent on the date of the encounter including direct interaction with the patient, performing chart review, documentation and further activities as noted above    Bossman Glaser MD   Department of Urology   [facility]                  Private car

## 2024-06-21 NOTE — LETTER
6/21/2024      Geovanni Galvez  [address] [place] [address]      Dear Colleague,    Thank you for referring your patient, Geovanni Galvez, to the [facility] [place]. Please see a copy of my visit note below.    Virtual Visit Details    Type of service:  Video Visit     Originating Location (pt. Location): Home    Distant Location (provider location):  On-site  Platform used for Video Visit: [facility]      Urology Clinic     HPI  Geovanni Galvez is a 53 year old male with history of kidney cancer status post right robotic partial nephrectomy on 4/30/2024, here for follow-up.      There has been consistent rise in his creatinine postoperatively therefore cross-sectional imaging was obtained which unfortunately showed hypoperfusion/infarction of the lower pole of the right kidney.  He has been having intermittent right upper abdominal discomfort for the past few weeks.    PHYSICAL EXAM  Vitals:  No vitals were obtained today due to virtual visit.    Physical Exam   EYES: Eyes grossly normal to inspection.  No discharge or erythema, or obvious scleral/conjunctival abnormalities.  SKIN: Visible skin clear. No significant rash, abnormal pigmentation or lesions.  NEURO: Cranial nerves grossly intact.  Mentation and speech appropriate for age.  GENERAL: Healthy, alert and no distress  RESP: No audible wheeze, cough, or visible cyanosis.  No visible retractions or increased work of breathing.    PSYCH: Mentation appears normal, affect normal/bright, judgement and insight intact, normal speech and appearance well-groomed.    Labs  Lab Results   Component Value Date    WBC 12.7 05/01/2024    WBC 9.5 02/21/2012     Lab Results   Component Value Date    RBC 4.34 05/01/2024    RBC 5.43 02/21/2012     Lab Results   Component Value Date    HGB 13.4 05/01/2024    HGB 17.1 02/21/2012     Lab Results   Component Value Date    HCT 39.4 05/01/2024    HCT 48.6 02/21/2012     No components  "found for: \"MCT\"  Lab Results   Component Value Date    MCV 91 05/01/2024    MCV 90 02/21/2012     Lab Results   Component Value Date    MCH 30.9 05/01/2024    MCH 31.5 02/21/2012     Lab Results   Component Value Date    MCHC 34.0 05/01/2024    MCHC 35.2 02/21/2012     Lab Results   Component Value Date    RDW 13.1 05/01/2024    RDW 13.4 02/21/2012     Lab Results   Component Value Date     05/01/2024     02/21/2012        Last Comprehensive Metabolic Panel:  Sodium   Date Value Ref Range Status   06/05/2024 138 135 - 145 mmol/L Final     Comment:     Reference intervals for this test were updated on 09/26/2023 to more accurately reflect our healthy population. There may be differences in the flagging of prior results with similar values performed with this method. Interpretation of those prior results can be made in the context of the updated reference intervals.    05/14/2019 139 133 - 144 mmol/L Final     Potassium   Date Value Ref Range Status   06/05/2024 4.2 3.4 - 5.3 mmol/L Final   09/23/2022 4.6 3.4 - 5.3 mmol/L Final   05/14/2019 4.6 3.4 - 5.3 mmol/L Final     Chloride   Date Value Ref Range Status   06/05/2024 98 98 - 107 mmol/L Final   09/23/2022 105 94 - 109 mmol/L Final   05/14/2019 104 94 - 109 mmol/L Final     Carbon Dioxide   Date Value Ref Range Status   05/14/2019 28 20 - 32 mmol/L Final     Carbon Dioxide (CO2)   Date Value Ref Range Status   06/05/2024 26 22 - 29 mmol/L Final   09/23/2022 29 20 - 32 mmol/L Final     Anion Gap   Date Value Ref Range Status   06/05/2024 14 7 - 15 mmol/L Final   09/23/2022 5 3 - 14 mmol/L Final   05/14/2019 7 3 - 14 mmol/L Final     Glucose   Date Value Ref Range Status   06/05/2024 73 70 - 99 mg/dL Final   09/23/2022 118 (H) 70 - 99 mg/dL Final   05/14/2019 101 (H) 70 - 99 mg/dL Final     Comment:     Fasting specimen     Urea Nitrogen   Date Value Ref Range Status   06/05/2024 15.8 6.0 - 20.0 mg/dL Final   09/23/2022 17 7 - 30 mg/dL Final "   05/14/2019 17 7 - 30 mg/dL Final     Creatinine   Date Value Ref Range Status   06/05/2024 1.33 (H) 0.67 - 1.17 mg/dL Final   05/14/2019 1.04 0.66 - 1.25 mg/dL Final     GFR Estimate   Date Value Ref Range Status   06/05/2024 64 >60 mL/min/1.73m2 Final   05/14/2019 85 >60 mL/min/[1.73_m2] Final     Comment:     Non  GFR Calc  Starting 12/18/2018, serum creatinine based estimated GFR (eGFR) will be   calculated using the Chronic Kidney Disease Epidemiology Collaboration   (CKD-EPI) equation.       Calcium   Date Value Ref Range Status   06/05/2024 9.5 8.6 - 10.0 mg/dL Final   05/14/2019 8.9 8.5 - 10.1 mg/dL Final     Bilirubin Total   Date Value Ref Range Status   04/15/2024 0.4 <=1.2 mg/dL Final   05/14/2019 0.9 0.2 - 1.3 mg/dL Final     Alkaline Phosphatase   Date Value Ref Range Status   04/15/2024 58 40 - 150 U/L Final     Comment:     Reference intervals for this test were updated on 11/14/2023 to more accurately reflect our healthy population. There may be differences in the flagging of prior results with similar values performed with this method. Interpretation of those prior results can be made in the context of the updated reference intervals.   05/14/2019 73 40 - 150 U/L Final     ALT   Date Value Ref Range Status   04/15/2024 38 0 - 70 U/L Final     Comment:     Reference intervals for this test were updated on 6/12/2023 to more accurately reflect our healthy population. There may be differences in the flagging of prior results with similar values performed with this method. Interpretation of those prior results can be made in the context of the updated reference intervals.     05/14/2019 53 0 - 70 U/L Final     AST   Date Value Ref Range Status   04/15/2024 24 0 - 45 U/L Final     Comment:     Reference intervals for this test were updated on 6/12/2023 to more accurately reflect our healthy population. There may be differences in the flagging of prior results with similar values  performed with this method. Interpretation of those prior results can be made in the context of the updated reference intervals.   05/14/2019 27 0 - 45 U/L Final       Prostate Specific Antigen Screen   Date Value Ref Range Status   11/06/2023 0.60 0.00 - 3.50 ng/mL Final   09/23/2022 0.85 0.00 - 4.00 ug/L Final          Imaging   FINDINGS:     LOWER CHEST: Normal.     HEPATOBILIARY: Normal.     PANCREAS: Normal.     SPLEEN: Normal.     ADRENAL GLANDS: Normal.     RIGHT KIDNEY/URETER: Interval partial nephrectomy with small postoperative collection along the surgical bed. No suspicious nodular enhancement to suggest local recurrence. There are sites of evolving fat necrosis within the perirenal fat. New   hypoenhancement and parenchymal thinning of the lower pole, suggesting subacute to chronic infarction. No suspicious parenchymal or urothelial lesion in the upper collecting system. No stone or hydronephrosis.     LEFT KIDNEY/URETER: No suspicious parenchymal or urothelial lesion in the upper collecting system. No stone or hydronephrosis.     BOWEL: Normal.     LYMPH NODES: Normal.     VASCULATURE: Normal.     MUSCULOSKELETAL: No aggressive osseous lesion.                                                                      IMPRESSION:  1.  Interval partial right nephrectomy with small postoperative collection. No evidence of local recurrence or metastatic abdominal disease.  2.  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       ASSESSMENT AND PLAN  53 year old male with history of CC RCC status post robotic left partial nephrectomy on 4/30/2024 with a slowly rising creatinine with radiologic evidence of right renal infarct involving the lower pole of the right kidney.  I explained to them that this is a rare incidence and is probably due to a small blockage in the branch of the artery feeding the lower pole of the right kidney.  Since he is mostly asymptomatic, I would  recommend observation at this point.  She understands that his GFR could become slightly worse but I do not anticipate any further deterioration of his renal function as the infarct area seems pretty well demarcated.    Plan   Follow-up in November with CT renal mass and BMP prior 30    30 total minutes spent on the date of the encounter including direct interaction with the patient, performing chart review, documentation and further activities as noted above    Bossman Glaser MD   Department of Urology   [facility]                   Again, thank you for allowing me to participate in the care of your patient.        Sincerely,        Bossman Glaser MD

## 2024-08-03 ENCOUNTER — NURSE TRIAGE (OUTPATIENT)
Dept: NURSING | Facility: CLINIC | Age: 54
End: 2024-08-03
Payer: COMMERCIAL

## 2024-08-03 NOTE — TELEPHONE ENCOUNTER
Nurse Triage SBAR    Situation: refill request    Background:   -Patient calling  -It is okay to call back and leave a detailed message at this number:       Assessment: He is calling about his folic acid prescription. He would like a refill.  He has enough to last until Monday.     Pt will call back Monday morning when clinic is open.      Reason for Disposition   [1] Prescription refill request for NON-ESSENTIAL medicine (i.e., no harm to patient if med not taken) AND [2] triager unable to refill per department policy    Protocols used: Medication Refill and Renewal Call-A-

## 2024-08-04 ENCOUNTER — MYC REFILL (OUTPATIENT)
Dept: RHEUMATOLOGY | Facility: CLINIC | Age: 54
End: 2024-08-04
Payer: COMMERCIAL

## 2024-08-04 DIAGNOSIS — M05.9 SEROPOSITIVE RHEUMATOID ARTHRITIS (H): ICD-10-CM

## 2024-08-06 ENCOUNTER — MYC MEDICAL ADVICE (OUTPATIENT)
Dept: RHEUMATOLOGY | Facility: CLINIC | Age: 54
End: 2024-08-06
Payer: COMMERCIAL

## 2024-08-06 RX ORDER — FOLIC ACID 1 MG/1
1 TABLET ORAL DAILY
Qty: 90 TABLET | Refills: 0 | Status: SHIPPED | OUTPATIENT
Start: 2024-08-06

## 2024-08-06 NOTE — TELEPHONE ENCOUNTER
folic acid (FOLVITE)   Last Written Prescription Date:  4/18/24  Last Fill Quantity: 90,  # refills: 0   Last office visit: 1/31/2024 ; last virtual visit: 6/5/2024 with prescribing provider:  Jack   Future Office Visit:  10/9/24    Requested Prescriptions   Pending Prescriptions Disp Refills    folic acid (FOLVITE) 1 MG tablet 90 tablet 0     Sig: Take 1 tablet (1 mg) by mouth daily       Vitamin Supplements Protocol Failed - 8/6/2024 10:55 AM        Failed - Medication indicated for associated diagnosis     The medication is prescribed for one or more of the following conditions:     Vitamin deficiency            Passed - The patient does not have an order for high-dose vitamin D        Passed - Medication is active on med list        Passed - The patient is 2 years of age or older        Passed - Recent (12 mo) or future (90 days) visit within the authorizing provider's specialty     The patient must have completed an in-person or virtual visit within the past 12 months or has a future visit scheduled within the next 90 days with the authorizing provider s specialty.  Urgent care and e-visits do not quality as an office visit for this protocol.       Viviana Pinon RN

## 2024-08-12 ENCOUNTER — VIRTUAL VISIT (OUTPATIENT)
Dept: RHEUMATOLOGY | Facility: CLINIC | Age: 54
End: 2024-08-12
Attending: INTERNAL MEDICINE
Payer: COMMERCIAL

## 2024-08-12 DIAGNOSIS — M05.9 SEROPOSITIVE RHEUMATOID ARTHRITIS (H): Primary | ICD-10-CM

## 2024-08-12 NOTE — PROGRESS NOTES
Medication Therapy Management (MTM) Encounter    ASSESSMENT:                            Medication Adherence/Access: No issues identified.    Rheumatoid arthritis: Patient's symptoms are well controlled on methotrexate. 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 Will review lab results at end of August and discuss continuing methotrexate vs. switching to Humira with provider.    PLAN:                            Will review lab results at end of August and touch base with provider re: staying on methotrexate vs. switching to Humira.    Follow-up: Pending lab results end of August.    SUBJECTIVE/OBJECTIVE:                          Uri Galvez is a 53 year old male called for an initial visit. He was referred to me from Vero Santiago MD.      Reason for visit: Humira education.    Allergies/ADRs: Reviewed in chart  Past Medical History: Reviewed in chart  Tobacco: He reports that he has never smoked. He has never been exposed to tobacco smoke. He has never used smokeless tobacco.  Alcohol: not assessed today    Medication Adherence/Access: No issues identified.    Rheumatoid arthritis:   Methotrexate 20 mg weekly   Folic acid 1 mg daily    Patient is currently well-controlled on methotrexate. He was diagnosed with RCC this past winter/spring. Provider had initially referred him to switch from methotrexate to Humira due to creatinine elevations, although he notes creatinine has since normalized post partial nephrectomy. He will have another BMP done at the end of August and wants to make a decision about switching from methotrexate to Humira at that time. He says he has discussed this with provider already. He was open to receiving Humira education today.    Creatinine completed at [facility] 8/5/24 1.23 (GFR 71 mL/min)    Component      Latest Ref Rng 11/21/2023  3:55 PM  5/1/2024  5:56 AM   WBC      4.0 - 11.0 10e3/uL  12.7 (H)    RBC Count      4.40 - 5.90 10e6/uL  4.34 (L)    Hemoglobin      13.3 - 17.7 g/dL  13.4    Hematocrit      40.0 - 53.0 %  39.4 (L)    MCV      78 - 100 fL  91    MCH      26.5 - 33.0 pg  30.9    MCHC      31.5 - 36.5 g/dL  34.0    RDW      10.0 - 15.0 %  13.1    Platelet Count      150 - 450 10e3/uL  227    Quantiferon-TB Gold Plus Result      Negative  Negative     TB1 Ag minus Nil Value      IU/mL 0.09     TB2 Ag minus Nil Value      IU/mL 0.01     Mitogen minus Nil Result      IU/mL 9.88     Nil Result      IU/mL 0.12     Hepatitis B Surface Antibody Instrument Value      <8.00 m[IU]/mL 12.43     Hepatitis B Surface Antibody Reactive     Hepatitis B Core Christy      Nonreactive  Nonreactive     Hep B Surface Agn      Nonreactive  Nonreactive     Quantiferon Nil Tube      IU/mL 0.12     Quantiferon TB1 Tube      IU/mL 0.21     Quantiferon TB2 Tube 0.13     QUANTIFERON MITOGEN      IU/mL 10.00       Component      Latest Ref Rng 6/5/2024  2:25 PM   Sodium      135 - 145 mmol/L 138    Potassium      3.4 - 5.3 mmol/L 4.2    Chloride      98 - 107 mmol/L 98    Carbon Dioxide (CO2)      22 - 29 mmol/L 26    Anion Gap      7 - 15 mmol/L 14    Urea Nitrogen      6.0 - 20.0 mg/dL 15.8    Creatinine      0.67 - 1.17 mg/dL 1.33 (H)    GFR Estimate      >60 mL/min/1.73m2 64    Calcium      8.6 - 10.0 mg/dL 9.5    Glucose      70 - 99 mg/dL 73    CRP Inflammation      <5.00 mg/L 4.27       Today's Vitals: There were no vitals taken for this visit.  ----------------    I spent 30 minutes with this patient today. All changes were made via collaborative practice agreement with Vero Santiago. A copy of the visit note was provided to the patient's provider(s).    A summary of these recommendations was sent via [employer].    Diana Rodríguez, PharmD  Medication Therapy Management Pharmacist  [facility]     Telemedicine Visit Details  Type of service:   Telephone visit  Start Time:  1300  End Time:  1330     Medication Therapy Recommendations  No medication therapy recommendations to display

## 2024-08-12 NOTE — Clinical Note
8/12/2024       RE: Geovanni Galvez  [address] [place] [address]     Dear Colleague,    Thank you for referring your patient, Geovanni Galvez, to the [facility] RHEUMATOLOGY CLINIC [place] at [facility]. Please see a copy of my visit note below.    Patient will have BMP done again at the end of August and he wants to make a decision about switching from methotrexate to Humira at that time. Last creatinine was done at [place] on 8/5 and had normalized.    Medication Therapy Management (MTM) Encounter    ASSESSMENT:                            Medication Adherence/Access: No issues identified.    Rheumatoid arthritis: Patient's symptoms are well controlled on methotrexate. 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 Will review lab results at end of August and discuss continuing methotrexate vs. switching to Humira with provider.    PLAN:                            Will review lab results at end of August and touch base with provider re: staying on methotrexate vs. switching to Humira.    Follow-up: Pending lab results end of August.    SUBJECTIVE/OBJECTIVE:                          Uri Galvez is a 53 year old male called for an initial visit. He was referred to me from Vero Santiago MD.      Reason for visit: Humira education.    Allergies/ADRs: Reviewed in chart  Past Medical History: Reviewed in chart  Tobacco: He reports that he has never smoked. He has never been exposed to tobacco smoke. He has never used smokeless tobacco.  Alcohol: not assessed today    Medication Adherence/Access: No issues identified.    Rheumatoid arthritis:   Methotrexate 20 mg weekly   Folic acid 1 mg daily    Patient is currently well-controlled on methotrexate. He was diagnosed with RCC this past winter/spring. Provider  had initially referred him to switch from methotrexate to Humira due to creatinine elevations, although he notes creatinine has since normalized post partial nephrectomy. He will have another BMP done at the end of August and wants to make a decision about switching from methotrexate to Humira at that time. He says he has discussed this with provider already. He was open to receiving Humira education today.    Creatinine completed at [facility] 8/5/24 1.23 (GFR 71 mL/min)    Component      Latest Ref Rng 11/21/2023  3:55 PM 5/1/2024  5:56 AM   WBC      4.0 - 11.0 10e3/uL  12.7 (H)    RBC Count      4.40 - 5.90 10e6/uL  4.34 (L)    Hemoglobin      13.3 - 17.7 g/dL  13.4    Hematocrit      40.0 - 53.0 %  39.4 (L)    MCV      78 - 100 fL  91    MCH      26.5 - 33.0 pg  30.9    MCHC      31.5 - 36.5 g/dL  34.0    RDW      10.0 - 15.0 %  13.1    Platelet Count      150 - 450 10e3/uL  227    Quantiferon-TB Gold Plus Result      Negative  Negative     TB1 Ag minus Nil Value      IU/mL 0.09     TB2 Ag minus Nil Value      IU/mL 0.01     Mitogen minus Nil Result      IU/mL 9.88     Nil Result      IU/mL 0.12     Hepatitis B Surface Antibody Instrument Value      <8.00 m[IU]/mL 12.43     Hepatitis B Surface Antibody Reactive     Hepatitis B Core Christy      Nonreactive  Nonreactive     Hep B Surface Agn      Nonreactive  Nonreactive     Quantiferon Nil Tube      IU/mL 0.12     Quantiferon TB1 Tube      IU/mL 0.21     Quantiferon TB2 Tube 0.13     QUANTIFERON MITOGEN      IU/mL 10.00       Component      Latest Ref Rng 6/5/2024  2:25 PM   Sodium      135 - 145 mmol/L 138    Potassium      3.4 - 5.3 mmol/L 4.2    Chloride      98 - 107 mmol/L 98    Carbon Dioxide (CO2)      22 - 29 mmol/L 26    Anion Gap      7 - 15 mmol/L 14    Urea Nitrogen      6.0 - 20.0 mg/dL 15.8    Creatinine      0.67 - 1.17 mg/dL 1.33 (H)    GFR Estimate      >60 mL/min/1.73m2 64    Calcium      8.6 - 10.0 mg/dL 9.5    Glucose      70 - 99 mg/dL 73     CRP Inflammation      <5.00 mg/L 4.27       Today's Vitals: There were no vitals taken for this visit.  ----------------    I spent 30 minutes with this patient today. All changes were made via collaborative practice agreement with Vero Santiago. A copy of the visit note was provided to the patient's provider(s).    A summary of these recommendations was sent via [employer].    Diana Rodríguez, PharmD  Medication Therapy Management Pharmacist  [facility]     Telemedicine Visit Details  Type of service:  Telephone visit  Start Time:  1300  End Time:  1330     Medication Therapy Recommendations  No medication therapy recommendations to display      Again, thank you for allowing me to participate in the care of your patient.      Sincerely,    Diana Rodríguez, [facility]

## 2024-08-12 NOTE — Clinical Note
Patient said he'd discussed with you (or maybe nephrologist?), last creatinine 1.23 and CrCl 79 mL/min - he said he's having labs rechecked at end of August and wants to stick with methotrexate vs. changing to Humira if continuing to go down. I can check back for those labs and touch base with you then, if you're okay with that? Thanks!

## 2024-08-13 NOTE — PATIENT INSTRUCTIONS
"Recommendations from today's MTM visit:                                                      Will review lab results at end of August and touch base with provider re: staying on methotrexate vs. switching to Humira.    Follow-up: Pending lab results end of August.    It was great speaking with you today.  I value your experience and would be very thankful for your time in providing feedback in our clinic survey. In the next few days, you may receive an email or text message from [facility] with a link to a survey related to your  clinical pharmacist.\"     To schedule another MTM appointment, please call the clinic directly or you may call the MTM scheduling line at [phone] or toll-free at [phone].     My Clinical Pharmacist's contact information:                                                      Please feel free to contact me with any questions or concerns you have.      Diana Rodríguez, PharmD  Medication Therapy Management Pharmacist  [facility]    "

## 2024-08-19 DIAGNOSIS — F41.1 GAD (GENERALIZED ANXIETY DISORDER): ICD-10-CM

## 2024-08-19 RX ORDER — CITALOPRAM HYDROBROMIDE 20 MG/1
20 TABLET ORAL DAILY
Qty: 90 TABLET | Refills: 3 | OUTPATIENT
Start: 2024-08-19

## 2024-09-06 ENCOUNTER — LAB (OUTPATIENT)
Dept: LAB | Facility: CLINIC | Age: 54
End: 2024-09-06
Payer: COMMERCIAL

## 2024-09-06 DIAGNOSIS — Z79.899 LONG-TERM USE OF HIGH-RISK MEDICATION: ICD-10-CM

## 2024-09-06 DIAGNOSIS — N28.0 RENAL INFARCT (H): ICD-10-CM

## 2024-09-06 DIAGNOSIS — C64.1 MALIGNANT NEOPLASM OF KIDNEY EXCLUDING RENAL PELVIS, RIGHT (H): ICD-10-CM

## 2024-09-06 LAB
ALBUMIN SERPL BCG-MCNC: 4.6 G/DL (ref 3.5–5.2)
ALP SERPL-CCNC: 60 U/L (ref 40–150)
ALT SERPL W P-5'-P-CCNC: 29 U/L (ref 0–70)
ANION GAP SERPL CALCULATED.3IONS-SCNC: 11 MMOL/L (ref 7–15)
AST SERPL W P-5'-P-CCNC: 29 U/L (ref 0–45)
BILIRUB SERPL-MCNC: 0.5 MG/DL
BUN SERPL-MCNC: 14.7 MG/DL (ref 6–20)
CALCIUM SERPL-MCNC: 9.4 MG/DL (ref 8.8–10.4)
CHLORIDE SERPL-SCNC: 99 MMOL/L (ref 98–107)
CREAT SERPL-MCNC: 1.19 MG/DL (ref 0.67–1.17)
EGFRCR SERPLBLD CKD-EPI 2021: 73 ML/MIN/1.73M2
ERYTHROCYTE [DISTWIDTH] IN BLOOD BY AUTOMATED COUNT: 12.9 % (ref 10–15)
GLUCOSE SERPL-MCNC: 113 MG/DL (ref 70–99)
HCO3 SERPL-SCNC: 27 MMOL/L (ref 22–29)
HCT VFR BLD AUTO: 44.2 % (ref 40–53)
HGB BLD-MCNC: 15.2 G/DL (ref 13.3–17.7)
MCH RBC QN AUTO: 31.4 PG (ref 26.5–33)
MCHC RBC AUTO-ENTMCNC: 34.4 G/DL (ref 31.5–36.5)
MCV RBC AUTO: 91 FL (ref 78–100)
PLATELET # BLD AUTO: 274 10E3/UL (ref 150–450)
POTASSIUM SERPL-SCNC: 4.5 MMOL/L (ref 3.4–5.3)
PROT SERPL-MCNC: 7.4 G/DL (ref 6.4–8.3)
RBC # BLD AUTO: 4.84 10E6/UL (ref 4.4–5.9)
SODIUM SERPL-SCNC: 137 MMOL/L (ref 135–145)
WBC # BLD AUTO: 6.1 10E3/UL (ref 4–11)

## 2024-09-06 PROCEDURE — 80053 COMPREHEN METABOLIC PANEL: CPT

## 2024-09-06 PROCEDURE — 36415 COLL VENOUS BLD VENIPUNCTURE: CPT

## 2024-09-06 PROCEDURE — 85027 COMPLETE CBC AUTOMATED: CPT

## 2024-09-09 DIAGNOSIS — M05.9 SEROPOSITIVE RHEUMATOID ARTHRITIS (H): ICD-10-CM

## 2024-09-09 RX ORDER — METHOTREXATE 2.5 MG/1
20 TABLET ORAL WEEKLY
Qty: 84 TABLET | Refills: 0 | Status: SHIPPED | OUTPATIENT
Start: 2024-09-09

## 2024-09-09 NOTE — TELEPHONE ENCOUNTER
"methotrexate 2.5 MG tablet      Last Written Prescription Date: 6/5/24  Last Fill Quantity: 84,   # refills: 0  Last Office Visit: 6/5/24  Future Office visit:  10/9/24    CBC RESULTS:   Recent Labs   Lab Test 09/06/24  1034   WBC 6.1   RBC 4.84   HGB 15.2   HCT 44.2   MCV 91   MCH 31.4   MCHC 34.4   RDW 12.9          Creatinine   Date Value Ref Range Status   09/06/2024 1.19 (H) 0.67 - 1.17 mg/dL Final   05/14/2019 1.04 0.66 - 1.25 mg/dL Final       Liver Function Studies -   Recent Labs   Lab Test 09/06/24  1034   PROTTOTAL 7.4   ALBUMIN 4.6   BILITOTAL 0.5   ALKPHOS 60   AST 29   ALT 29       Routing refill request to provider for review/approval because:   -DMARD  -Per 6/5/24 note \"Continue methotrexate 20 mg/week and folic acid 1 mg daily. Refills provided. Will check BMP. If creatinine continues to be elevated, may consider switching to Humira.\"    Viviana Pinon RN      "

## 2024-09-10 ENCOUNTER — MYC MEDICAL ADVICE (OUTPATIENT)
Dept: RHEUMATOLOGY | Facility: CLINIC | Age: 54
End: 2024-09-10
Payer: COMMERCIAL

## 2024-09-10 DIAGNOSIS — M05.9 SEROPOSITIVE RHEUMATOID ARTHRITIS (H): Primary | ICD-10-CM

## 2024-09-13 NOTE — TELEPHONE ENCOUNTER
Component      Latest Ref Rng 11/21/2023  3:55 PM   Quantiferon-TB Gold Plus Result      Negative  Negative    Hepatitis B Surface Antibody Reactive    Hepatitis B Core Christy      Nonreactive  Nonreactive    Hep B Surface Agn      Nonreactive  Nonreactive

## 2024-09-16 ENCOUNTER — VIRTUAL VISIT (OUTPATIENT)
Dept: RHEUMATOLOGY | Facility: CLINIC | Age: 54
End: 2024-09-16
Attending: INTERNAL MEDICINE
Payer: COMMERCIAL

## 2024-09-16 ENCOUNTER — TELEPHONE (OUTPATIENT)
Dept: RHEUMATOLOGY | Facility: CLINIC | Age: 54
End: 2024-09-16
Payer: COMMERCIAL

## 2024-09-16 DIAGNOSIS — M05.9 SEROPOSITIVE RHEUMATOID ARTHRITIS (H): Primary | ICD-10-CM

## 2024-09-16 DIAGNOSIS — Z71.85 VACCINE COUNSELING: ICD-10-CM

## 2024-09-16 NOTE — Clinical Note
9/16/2024       RE: Geovanni Galvez  [address] [place] [address]     Dear Colleague,    Thank you for referring your patient, Geovanni Galvez, to the [facility] RHEUMATOLOGY CLINIC [place] at [facility]. Please see a copy of my visit note below.    Medication Therapy Management (MTM) Encounter    ASSESSMENT:                            Medication Adherence/Access: No issues identified.    Rheumatoid arthritis: Patient's symptoms are well controlled on methotrexate. 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 Will review lab results at end of August and discuss continuing methotrexate vs. switching to Humira with provider.    PLAN:                            Will review lab results at end of August and touch base with provider re: staying on methotrexate vs. switching to Humira.    Follow-up: Pending lab results end of August.    SUBJECTIVE/OBJECTIVE:                          Uri Galvez is a 53 year old male called for an initial visit. He was referred to me from Vero Santiago MD.      Reason for visit: Humira education.    Allergies/ADRs: Reviewed in chart  Past Medical History: Reviewed in chart  Tobacco: He reports that he has never smoked. He has never been exposed to tobacco smoke. He has never used smokeless tobacco.  Alcohol: not assessed today    Medication Adherence/Access: No issues identified.    Rheumatoid arthritis:   Methotrexate 20 mg weekly   Folic acid 1 mg daily    Switching to Humira per provider given creatinine is still slightly elevated.    Patient is currently well-controlled on methotrexate. He was diagnosed with RCC this past winter/spring. Provider had initially referred him to switch from methotrexate to Humira due to creatinine elevations, although he notes creatinine  has since normalized post partial nephrectomy. He will have another BMP done at the end of August and wants to make a decision about switching from methotrexate to Humira at that time. He says he has discussed this with provider already. He was open to receiving Humira education today.    Creatinine completed at [facility] 8/5/24 1.23 (GFR 71 mL/min)    Component      Latest Ref Rng 11/21/2023  3:55 PM 5/1/2024  5:56 AM   WBC      4.0 - 11.0 10e3/uL  12.7 (H)    RBC Count      4.40 - 5.90 10e6/uL  4.34 (L)    Hemoglobin      13.3 - 17.7 g/dL  13.4    Hematocrit      40.0 - 53.0 %  39.4 (L)    MCV      78 - 100 fL  91    MCH      26.5 - 33.0 pg  30.9    MCHC      31.5 - 36.5 g/dL  34.0    RDW      10.0 - 15.0 %  13.1    Platelet Count      150 - 450 10e3/uL  227    Quantiferon-TB Gold Plus Result      Negative  Negative     TB1 Ag minus Nil Value      IU/mL 0.09     TB2 Ag minus Nil Value      IU/mL 0.01     Mitogen minus Nil Result      IU/mL 9.88     Nil Result      IU/mL 0.12     Hepatitis B Surface Antibody Instrument Value      <8.00 m[IU]/mL 12.43     Hepatitis B Surface Antibody Reactive     Hepatitis B Core Christy      Nonreactive  Nonreactive     Hep B Surface Agn      Nonreactive  Nonreactive     Quantiferon Nil Tube      IU/mL 0.12     Quantiferon TB1 Tube      IU/mL 0.21     Quantiferon TB2 Tube 0.13     QUANTIFERON MITOGEN      IU/mL 10.00       Component      Latest Ref Rng 6/5/2024  2:25 PM   Sodium      135 - 145 mmol/L 138    Potassium      3.4 - 5.3 mmol/L 4.2    Chloride      98 - 107 mmol/L 98    Carbon Dioxide (CO2)      22 - 29 mmol/L 26    Anion Gap      7 - 15 mmol/L 14    Urea Nitrogen      6.0 - 20.0 mg/dL 15.8    Creatinine      0.67 - 1.17 mg/dL 1.33 (H)    GFR Estimate      >60 mL/min/1.73m2 64    Calcium      8.6 - 10.0 mg/dL 9.5    Glucose      70 - 99 mg/dL 73    CRP Inflammation      <5.00 mg/L 4.27       Vaccine counseling: ***     Immunization History   Administered Date(s)  Administered    COVID-19 12+ (Pfizer) 11/28/2023    COVID-19 MONOVALENT 12+ (Pfizer) 03/13/2021, 04/03/2021, 01/02/2022    Influenza Vaccine >6 months,quad, PF 11/28/2023    Pneumococcal 20 valent Conjugate (Prevnar 20) 11/28/2023    TDAP (Adacel,Boostrix) 09/21/2022    TDAP Vaccine (Adacel) 03/08/2012    Zoster recombinant adjuvanted (SHINGRIX) 09/19/2023, 03/28/2024      Today's Vitals: There were no vitals taken for this visit.  ----------------    I spent 30 minutes with this patient today. All changes were made via collaborative practice agreement with * No referring provider recorded for this case *. A copy of the visit note was provided to the patient's provider(s).    A summary of these recommendations was sent via [employer].    Diana Rodríguez, PharmD  Medication Therapy Management Pharmacist  [facility]     Telemedicine Visit Details  Type of service:  Telephone visit  Start Time:  1300  End Time:  1330     Medication Therapy Recommendations  No medication therapy recommendations to display    Medication Therapy Management (MTM) Encounter    ASSESSMENT:                            Medication Adherence/Access: No issues identified.    Rheumatoid arthritis: Patient's symptoms are well controlled on methotrexate, however creatinine remains slightly elevated post-nephrectomy and per provider will be switching to Humira to prevent contribution from medication. Will discuss overlapping methotrexate with Humira by several weeks given creatinine is minimally elevated/overall stable and to prevent risk of flare upon medication change. Humira education provided today including administration, monitoring, common and serious side effects (including risk of infection and small risk of malignancy), and time to effectiveness.    Vaccine counseling: Indicated for COVID and flu boosters per ACIP recommendations.    PLAN:                            Writer will confirm if provider would like to overlap  methotrexate with Humira to reduce risk of flare upon medication change.  Once approved, start Humira 40 mg every 14 days. Report any infections, planned surgeries, or disease flares to the rheumatology department.  Consider the following vaccines: COVID and flu boosters.    Follow-up: Pending provider response re: methotrexate overlap, then 3 months for Humira effectiveness and tolerability. Sees rheumatologist 10/9/24.    SUBJECTIVE/OBJECTIVE:                          Uri Galvez is a 53 year old male called for a follow up visit from 8/12/24. He was referred to me from Vero Santiago MD.    Reason for visit: Humira start.    Allergies/ADRs: Reviewed in chart  Past Medical History: Reviewed in chart  Tobacco: He reports that he has never smoked. He has never been exposed to tobacco smoke. He has never used smokeless tobacco.  Alcohol: not assessed today    Medication Adherence/Access: No issues identified.    Rheumatoid arthritis:   Methotrexate 20 mg weekly   Folic acid 1 mg daily    Patient had his creatinine rechecked as planned and is okay with proceeding with switch to Humira per provider recommendation. Note he is well-controlled on methotrexate. Was diagnosed with RCC this past winter/spring and experienced creatinine elevations following nephrectomy. He is wondering when to stop taking the methotrexate.    Component      Latest Ref Rng 9/6/2024  10:34 AM   Sodium      135 - 145 mmol/L 137    Potassium      3.4 - 5.3 mmol/L 4.5    Carbon Dioxide (CO2)      22 - 29 mmol/L 27    Anion Gap      7 - 15 mmol/L 11    Urea Nitrogen      6.0 - 20.0 mg/dL 14.7    Creatinine      0.67 - 1.17 mg/dL 1.19 (H)    GFR Estimate      >60 mL/min/1.73m2 73    Calcium      8.8 - 10.4 mg/dL 9.4    Chloride      98 - 107 mmol/L 99    Glucose      70 - 99 mg/dL 113 (H)    Alkaline Phosphatase      40 - 150 U/L 60    AST      0 - 45 U/L 29    ALT      0 - 70 U/L 29    Protein Total      6.4 - 8.3 g/dL 7.4    Albumin      3.5 - 5.2  g/dL 4.6    Bilirubin Total      <=1.2 mg/dL 0.5    WBC      4.0 - 11.0 10e3/uL 6.1    RBC Count      4.40 - 5.90 10e6/uL 4.84    Hemoglobin      13.3 - 17.7 g/dL 15.2    Hematocrit      40.0 - 53.0 % 44.2    MCV      78 - 100 fL 91    MCH      26.5 - 33.0 pg 31.4    MCHC      31.5 - 36.5 g/dL 34.4    RDW      10.0 - 15.0 % 12.9    Platelet Count      150 - 450 10e3/uL 274       Reviewed baseline pre-biologic screening:  Hep C antibody non-reactive  Hep B surface antibody not completed  Hep B surface antigen non-reactive  Hep B core antibody non-reactive  Quantiferon TB Negative  HIV antigen non-reactive     Vaccine counseling: Patient is generally open to receiving recommended vaccines.     Immunization History   Administered Date(s) Administered     COVID-19 12+ (Pfizer) 11/28/2023     COVID-19 MONOVALENT 12+ (Pfizer) 03/13/2021, 04/03/2021, 01/02/2022     Influenza Vaccine >6 months,quad, PF 11/28/2023     Pneumococcal 20 valent Conjugate (Prevnar 20) 11/28/2023     TDAP (Adacel,Boostrix) 09/21/2022     TDAP Vaccine (Adacel) 03/08/2012     Zoster recombinant adjuvanted (SHINGRIX) 09/19/2023, 03/28/2024      Today's Vitals: There were no vitals taken for this visit.  ----------------    I spent 30 minutes with this patient today. All changes were made via collaborative practice agreement with Vero Santiago MD. A copy of the visit note was provided to the patient's provider(s).    A summary of these recommendations was sent via [employer].    Diana Rodríguez, PharmD  Medication Therapy Management Pharmacist  [facility]     Telemedicine Visit Details  Type of service:  Telephone visit  Start Time: 1030  End Time: 1100     Medication Therapy Recommendations  No medication therapy recommendations to display      Again, thank you for allowing me to participate in the care of your patient.      Sincerely,    Diana Rodríguez, [facility]

## 2024-09-16 NOTE — PROGRESS NOTES
Medication Therapy Management (MTM) Encounter    ASSESSMENT:                            Medication Adherence/Access: No issues identified.    Rheumatoid arthritis: Patient's symptoms are well controlled on methotrexate, however creatinine remains slightly elevated post-nephrectomy and per provider will be switching to Humira to prevent contribution from medication. Will discuss overlapping methotrexate with Humira by several weeks given creatinine is minimally elevated/overall stable and to prevent risk of flare upon medication change. Humira education provided today including administration, monitoring, common and serious side effects (including risk of infection and small risk of malignancy), and time to effectiveness.    Vaccine counseling: Indicated for COVID and flu boosters per ACIP recommendations.    PLAN:                            Writer will confirm if provider would like to overlap methotrexate with Humira to reduce risk of flare upon medication change.  Once approved, start Humira 40 mg every 14 days. Report any infections, planned surgeries, or disease flares to the rheumatology department.  Consider the following vaccines: COVID and flu boosters.    Follow-up: Pending provider response re: methotrexate overlap, then 3 months for Humira effectiveness and tolerability. Sees rheumatologist 10/9/24.    SUBJECTIVE/OBJECTIVE:                          Uri Galvez is a 53 year old male called for a follow up visit from 8/12/24. He was referred to me from Vero Santiago MD.    Reason for visit: Humira start.    Allergies/ADRs: Reviewed in chart  Past Medical History: Reviewed in chart  Tobacco: He reports that he has never smoked. He has never been exposed to tobacco smoke. He has never used smokeless tobacco.  Alcohol: not assessed today    Medication Adherence/Access: No issues identified.    Rheumatoid arthritis:   Methotrexate 20 mg weekly   Folic acid 1 mg daily    Patient had his creatinine rechecked as  planned and is okay with proceeding with switch to Humira per provider recommendation. Note he is well-controlled on methotrexate. Was diagnosed with RCC this past winter/spring and experienced creatinine elevations following nephrectomy. He is wondering when to stop taking the methotrexate.    Component      Latest Ref Rng 9/6/2024  10:34 AM   Sodium      135 - 145 mmol/L 137    Potassium      3.4 - 5.3 mmol/L 4.5    Carbon Dioxide (CO2)      22 - 29 mmol/L 27    Anion Gap      7 - 15 mmol/L 11    Urea Nitrogen      6.0 - 20.0 mg/dL 14.7    Creatinine      0.67 - 1.17 mg/dL 1.19 (H)    GFR Estimate      >60 mL/min/1.73m2 73    Calcium      8.8 - 10.4 mg/dL 9.4    Chloride      98 - 107 mmol/L 99    Glucose      70 - 99 mg/dL 113 (H)    Alkaline Phosphatase      40 - 150 U/L 60    AST      0 - 45 U/L 29    ALT      0 - 70 U/L 29    Protein Total      6.4 - 8.3 g/dL 7.4    Albumin      3.5 - 5.2 g/dL 4.6    Bilirubin Total      <=1.2 mg/dL 0.5    WBC      4.0 - 11.0 10e3/uL 6.1    RBC Count      4.40 - 5.90 10e6/uL 4.84    Hemoglobin      13.3 - 17.7 g/dL 15.2    Hematocrit      40.0 - 53.0 % 44.2    MCV      78 - 100 fL 91    MCH      26.5 - 33.0 pg 31.4    MCHC      31.5 - 36.5 g/dL 34.4    RDW      10.0 - 15.0 % 12.9    Platelet Count      150 - 450 10e3/uL 274       Reviewed baseline pre-biologic screening:  Hep C antibody non-reactive  Hep B surface antibody not completed  Hep B surface antigen non-reactive  Hep B core antibody non-reactive  Quantiferon TB Negative  HIV antigen non-reactive     Vaccine counseling: Patient is generally open to receiving recommended vaccines.     Immunization History   Administered Date(s) Administered    COVID-19 12+ (Pfizer) 11/28/2023    COVID-19 MONOVALENT 12+ (Pfizer) 03/13/2021, 04/03/2021, 01/02/2022    Influenza Vaccine >6 months,quad, PF 11/28/2023    Pneumococcal 20 valent Conjugate (Prevnar 20) 11/28/2023    TDAP (Adacel,Boostrix) 09/21/2022    TDAP Vaccine (Adacel)  03/08/2012    Zoster recombinant adjuvanted (SHINGRIX) 09/19/2023, 03/28/2024      Today's Vitals: There were no vitals taken for this visit.  ----------------    I spent 30 minutes with this patient today. All changes were made via collaborative practice agreement with Vero Santiago MD. A copy of the visit note was provided to the patient's provider(s).    A summary of these recommendations was sent via [employer].    Diana Rodríguez, PharmD  Medication Therapy Management Pharmacist  [facility]     Telemedicine Visit Details  Type of service:  Telephone visit  Start Time: 1030  End Time: 1100     Medication Therapy Recommendations  No medication therapy recommendations to display

## 2024-09-16 NOTE — TELEPHONE ENCOUNTER
PA Initiation    Medication: HUMIRA *CF* PEN 40 MG/0.4ML SC PNKT  Insurance Company: [employer] - Phone [phone] Fax [phone]  Pharmacy Filling the Rx: [facility] - [place], [place] - [facility] JANEL JUAREZ  Filling Pharmacy Phone:    Filling Pharmacy Fax:    Start Date: 9/16/2024    [id number]

## 2024-09-16 NOTE — Clinical Note
Humira education provided, order placed last week. Do you want to overlap methotrexate by several weeks given his creatinine is overall stable and to prevent disease flare, or make a direct switch? Let me know and I'll update him. Thanks!

## 2024-09-17 NOTE — PATIENT INSTRUCTIONS
"Recommendations from today's MTM visit:                                                      Writer will confirm if provider would like to overlap methotrexate with Humira to reduce risk of flare upon medication change.  Once approved, start Humira 40 mg every 14 days. Report any infections, planned surgeries, or disease flares to the rheumatology department.  Consider the following vaccines: COVID and flu boosters.    Follow-up: Pending provider response re: methotrexate overlap, then 3 months for Humira effectiveness and tolerability. Sees rheumatologist 10/9/24.    It was great speaking with you today.  I value your experience and would be very thankful for your time in providing feedback in our clinic survey. In the next few days, you may receive an email or text message from [employer] with a link to a survey related to your  clinical pharmacist.\"     To schedule another MTM appointment, please call the clinic directly or you may call the MTM scheduling line at [phone] or toll-free at [phone].     My Clinical Pharmacist's contact information:                                                      Please feel free to contact me with any questions or concerns you have.      Diana Rodríguez, PharmD  Medication Therapy Management Pharmacist  [facility]    "

## 2024-09-19 NOTE — TELEPHONE ENCOUNTER
Prior Authorization Approval    Medication: HUMIRA *CF* PEN 40 MG/0.4ML SC PNKT  Authorization Effective Date: 9/19/2024  Authorization Expiration Date: 9/18/2025  Approved Dose/Quantity: 2  Reference #: [id number]   Insurance Company: [employer] - Phone [phone] Fax [phone]  Expected CoPay: $    CoPay Card Available:      Financial Assistance Needed: no  Which Pharmacy is filling the prescription: [facility] - [place], [place] - [address] [place] [address]  Pharmacy Notified: yes  Patient Notified: yes

## 2024-10-09 ENCOUNTER — VIRTUAL VISIT (OUTPATIENT)
Dept: RHEUMATOLOGY | Facility: CLINIC | Age: 54
End: 2024-10-09
Payer: COMMERCIAL

## 2024-10-09 VITALS — BODY MASS INDEX: 31.5 KG/M2 | WEIGHT: 225 LBS | HEIGHT: 71 IN

## 2024-10-09 DIAGNOSIS — Z79.899 LONG-TERM USE OF HIGH-RISK MEDICATION: ICD-10-CM

## 2024-10-09 DIAGNOSIS — C64.1 CLEAR CELL CARCINOMA OF RIGHT KIDNEY (H): ICD-10-CM

## 2024-10-09 DIAGNOSIS — R76.8 RHEUMATOID FACTOR POSITIVE: ICD-10-CM

## 2024-10-09 DIAGNOSIS — R76.8 CYCLIC CITRULLINATED PEPTIDE (CCP) ANTIBODY POSITIVE: ICD-10-CM

## 2024-10-09 DIAGNOSIS — R76.8 POSITIVE ANA (ANTINUCLEAR ANTIBODY): ICD-10-CM

## 2024-10-09 DIAGNOSIS — M05.9 SEROPOSITIVE RHEUMATOID ARTHRITIS (H): Primary | ICD-10-CM

## 2024-10-09 PROCEDURE — G2211 COMPLEX E/M VISIT ADD ON: HCPCS | Performed by: INTERNAL MEDICINE

## 2024-10-09 PROCEDURE — 99214 OFFICE O/P EST MOD 30 MIN: CPT | Mod: 95 | Performed by: INTERNAL MEDICINE

## 2024-10-09 ASSESSMENT — PAIN SCALES - GENERAL: PAINLEVEL: NO PAIN (0)

## 2024-10-09 NOTE — NURSING NOTE
Current patient location: [address] [place] [address]    Is the patient currently in the state of [place]? YES    Visit mode:VIDEO    If the visit is dropped, the patient can be reconnected by: VIDEO VISIT: Send to e-mail at: [email or web address]    Will anyone else be joining the visit? NO  (If patient encounters technical issues they should call [phone]    Are changes needed to the allergy or medication list? No    Are refills needed on medications prescribed by this physician? NO    Rooming Documentation:  Not applicable    Reason for visit: RECHECK    No other vitals to report per pt    Claire BRADLEYF

## 2024-10-09 NOTE — PROGRESS NOTES
TELE HEALTH NOTE- RHEUMATOLOGY    Verbal consent obtained from patient for telehealth services provided below.  Communication with patient was conducted via [employer].  Location of Patient: Home  Location of Provider: [facility]  I spent over 50% of the visit devoted to coordination of care and counseling with the patient, as documented under PLANS below.  Geovanni Galvez   Joined the call at 10/9/2024, 1:28:39 pm.  Left the call at 10/9/2024, 1:34:52 pm.  You were on the call for 6 minutes 13 seconds .    CC: Follow up for rheumatoid arthritis    HPI:    Patient is a 53 year old White male presenting for Telemedicine today as a follow-up patient. Currently on Humira since 9/2024 with plans to overlap methotrexate 20 mg per week along with daily folic acid 1 mg for 1 month and later stop due to rise in creatinine. He denied having joint pain or swelling. Occasional swelling of hands. Stiffness lasts all day.     HISTORY:    Past Medical History:   Diagnosis Date    Hypertension        Past Surgical History:   Procedure Laterality Date    COLONOSCOPY N/A 11/17/2022    Procedure: COLONOSCOPY;  Surgeon: John Blank MD;  Location:  GI    DAVINCI NEPHRECTOMY PARTIAL Right 4/30/2024    Procedure: Robot assisted Laparoscopic Partial  NEPHRECTOMY. ULTRASOUND OF RENAL MASS;  Surgeon: Bossman Glaser MD;  Location: [facility] OR       Family History   Problem Relation Age of Onset    Alcohol/Drug Father     Cancer Father         pancreatic    Colon Cancer No family hx of        History   Smoking Status    Never   Smokeless Tobacco    Never       Social History    Substance and Sexual Activity      Alcohol use: Not Currently        Comment: last drink 2014      History   Drug Use No       Social History     Social History Narrative    Not on file       Current Outpatient Medications   Medication Sig Dispense Refill    adalimumab (HUMIRA *CF*) 40 MG/0.4ML pen kit Inject 0.4 mLs (40 mg) subcutaneously every 14 days.  Hold for signs of infection, then seek medical attention. 0.8 mL 3    citalopram (CELEXA) 20 MG tablet Take 1 tablet (20 mg) by mouth daily (Patient taking differently: Take 20 mg by mouth every morning) 90 tablet 3    folic acid (FOLVITE) 1 MG tablet Take 1 tablet (1 mg) by mouth daily 90 tablet 0    losartan-hydrochlorothiazide (HYZAAR) 50-12.5 MG tablet Take 1 tablet by mouth daily (Patient taking differently: Take 1 tablet by mouth every morning) 90 tablet 3    methotrexate 2.5 MG tablet Take 8 tablets (20 mg) by mouth once a week. WEDNESDAY'S  8 Tablets 84 tablet 0     Labs     Sept 30th, 2024  CBC - normal   CMP - creatinine normal, normal LFT ( [facility])    TELE HEALTH EXAM:    The patient sounded Alert and oriented  General: Alert, No apparent distress   Psych: Affect euthymic  Skin: No rashes noted  Neck: No visible swelling  Respiratory: Breathing appears normal  Fist forming 100%    1. Seropositive rheumatoid arthritis (H)    2. Cyclic citrullinated peptide (CCP) antibody positive    3. Rheumatoid factor positive    4. Positive SAIRA (antinuclear antibody)    5. Long-term use of high-risk medication    6. Clear cell RCC    (M25.50) Polyarthralgia  (primary encounter diagnosis)  (R76.8) Rheumatoid factor positive  (R76.8) Cyclic citrullinated peptide (CCP) antibody positive  Comment: RF-90, CCP-12. X ray hands - b/l [facility] arthrosis.  Initial symptoms included pain and swelling of hands.X ray without changes of inflammatory arthropathy.     Started on SSZ in Nov 2023.  Discontinued later due to GI upset.Will avoid plaquenil as he may likely need more than that to control disease activity and patient also prefers to avoid HCQ after side effects were explained. Currently on Humira since 9/2024 with plans to overlap methotrexate for 1 months and later stop due to previous rise in creatinine.     He is able to make 100% fist bilaterally. He does have stiffness that lasts all day with occasional swelling of his  hands.     Plan:   Continue methotrexate 20 mg per week. Continue for another month and then stop.   Continue folic acid 1 mg daily.   Continue Humira 40 mg subcutaneous every two weeks.   Check labs at next visit.       (R76.8) Positive SAIRA (antinuclear antibody)  Comment: noted, 1:40, speckled. No clinical features of CTD.   Plan: check additional labs as below.    Long term use of high risk medication   Comment : methotrexate  Plan : The adverse reactions of MTX was discussed which includes cytopenia, immunosuppression, infection, oversedation, pneumonitis, dizziness, nausea, stomach upset, risk of falls, seizures, and derangements in LFTs. Recommended against use of alcohol while on MTX. pt counseled on the adverse effects of Methotrexate including teratogenicity and need for reliable contraception, Alopecia, infection, liver dysfunction and myelosuppression, including the importance of taking daily Folic acid. Patient verbalized understanding and agrees to proceed.    have discussed with potential adverse effects with use of biologics such as suppression of immune system making pt more prone to infections, to hold the medication if the patient has any signs of any infection and to restart only after infection has cleared, reactivation of infections particularly TB and hepatitis B, C, fungal infections, risk of demyelinating disorders and to stop medication immediately if there's any weakness or paresthesias, risk of malignancy. Patient verbalized understanding and agrees to proceed.    RCC   Comment : s/p right partial nephrectomy in 2024  Plan : per urology     Rtc in 6 months.     After visit summary (AVS ) documentation will be available through [facility] for this encounter.     My diagnostic impression and treatment plans were discussed at length with the patient.  All side effects as well as drug-drug interactions and risks discussed at length.    Vero Santiago MD

## 2024-10-16 DIAGNOSIS — N28.0 RENAL INFARCT (H): Primary | ICD-10-CM

## 2024-10-22 ENCOUNTER — HOSPITAL ENCOUNTER (OUTPATIENT)
Dept: CT IMAGING | Facility: CLINIC | Age: 54
Discharge: HOME | End: 2024-10-22
Attending: UROLOGY | Admitting: UROLOGY
Payer: COMMERCIAL

## 2024-10-22 DIAGNOSIS — C64.1 MALIGNANT NEOPLASM OF KIDNEY EXCLUDING RENAL PELVIS, RIGHT (H): ICD-10-CM

## 2024-10-22 DIAGNOSIS — N28.0 RENAL INFARCT (H): ICD-10-CM

## 2024-10-22 LAB
CREAT BLD-MCNC: 1.4 MG/DL (ref 0.7–1.3)
EGFRCR SERPLBLD CKD-EPI 2021: 60 ML/MIN/1.73M2

## 2024-10-22 PROCEDURE — 82565 ASSAY OF CREATININE: CPT

## 2024-10-22 PROCEDURE — 250N000009 HC RX 250: Performed by: UROLOGY

## 2024-10-22 PROCEDURE — 74178 CT ABD&PLV WO CNTR FLWD CNTR: CPT

## 2024-10-22 PROCEDURE — 250N000011 HC RX IP 250 OP 636: Performed by: UROLOGY

## 2024-10-22 RX ORDER — IOPAMIDOL 755 MG/ML
500 INJECTION, SOLUTION INTRAVASCULAR ONCE
Status: COMPLETED | OUTPATIENT
Start: 2024-10-22 | End: 2024-10-22

## 2024-10-22 RX ADMIN — IOPAMIDOL 100 ML: 755 INJECTION, SOLUTION INTRAVENOUS at 11:17

## 2024-10-22 RX ADMIN — SODIUM CHLORIDE 65 ML: 9 INJECTION, SOLUTION INTRAVENOUS at 11:17

## 2024-10-25 ENCOUNTER — VIRTUAL VISIT (OUTPATIENT)
Dept: ONCOLOGY | Facility: CLINIC | Age: 54
End: 2024-10-25
Attending: UROLOGY
Payer: COMMERCIAL

## 2024-10-25 VITALS — WEIGHT: 225 LBS | BODY MASS INDEX: 31.5 KG/M2 | HEIGHT: 71 IN

## 2024-10-25 ASSESSMENT — PAIN SCALES - GENERAL: PAINLEVEL_OUTOF10: NO PAIN (0)

## 2024-10-25 NOTE — PROGRESS NOTES
"Virtual Visit Details    Type of service:  Video Visit     Originating Location (pt. Location): {video visit patient location:2597[address]::\"Home\"}  {PROVIDER LOCATION On-site should be selected for visits conducted from your clinic location or adjoining [facility] hospital, academic office, or other nearby [facility] building. Off-site should be selected for all other provider locations, including home:942970}  Distant Location (provider location):  {virtual location provider:938966}  Platform used for Video Visit: {Virtual Visit Platforms:531243::\"[employer]\"}  "

## 2024-10-25 NOTE — NURSING NOTE
Current patient location: [address] [place] [address]    Is the patient currently in the state of [place]? YES    Visit mode:VIDEO    If the visit is dropped, the patient can be reconnected by: VIDEO VISIT: Text to cell phone:   Telephone Information:   Mobile [phone]       Will anyone else be joining the visit? NO  (If patient encounters technical issues they should call [phone]    Are changes needed to the allergy or medication list? Pt stated no changes to allergies and Pt stated no med changes    Are refills needed on medications prescribed by this physician? NO    Rooming Documentation:  Unable to complete questionnaire(s) due to time    Reason for visit: FAUSTINA Jalloh LPN

## 2024-11-02 ENCOUNTER — MYC MEDICAL ADVICE (OUTPATIENT)
Dept: PHARMACY | Facility: CLINIC | Age: 54
End: 2024-11-02
Payer: COMMERCIAL

## 2024-11-02 DIAGNOSIS — M05.9 SEROPOSITIVE RHEUMATOID ARTHRITIS (H): Primary | ICD-10-CM

## 2024-11-04 RX ORDER — FOLIC ACID 1 MG/1
1 TABLET ORAL DAILY
Qty: 30 TABLET | Refills: 0 | Status: SHIPPED | OUTPATIENT
Start: 2024-11-04

## 2024-11-08 DIAGNOSIS — I10 ESSENTIAL HYPERTENSION WITH GOAL BLOOD PRESSURE LESS THAN 140/90: ICD-10-CM

## 2024-11-08 RX ORDER — LOSARTAN POTASSIUM AND HYDROCHLOROTHIAZIDE 12.5; 5 MG/1; MG/1
1 TABLET ORAL DAILY
Qty: 90 TABLET | Refills: 0 | Status: SHIPPED | OUTPATIENT
Start: 2024-11-08

## 2024-11-15 DIAGNOSIS — F41.1 GAD (GENERALIZED ANXIETY DISORDER): ICD-10-CM

## 2024-11-15 RX ORDER — CITALOPRAM HYDROBROMIDE 20 MG/1
20 TABLET ORAL DAILY
Qty: 90 TABLET | Refills: 3 | Status: SHIPPED | OUTPATIENT
Start: 2024-11-15

## 2024-11-17 ENCOUNTER — MYC REFILL (OUTPATIENT)
Dept: PEDIATRICS | Facility: CLINIC | Age: 54
End: 2024-11-17
Payer: COMMERCIAL

## 2024-11-17 DIAGNOSIS — F41.1 GAD (GENERALIZED ANXIETY DISORDER): ICD-10-CM

## 2024-11-18 RX ORDER — CITALOPRAM HYDROBROMIDE 20 MG/1
20 TABLET ORAL DAILY
Qty: 90 TABLET | Refills: 3 | OUTPATIENT
Start: 2024-11-18

## 2024-11-27 DIAGNOSIS — M05.9 SEROPOSITIVE RHEUMATOID ARTHRITIS (H): Primary | ICD-10-CM

## 2024-12-03 RX ORDER — FOLIC ACID 1 MG/1
1000 TABLET ORAL DAILY
Qty: 30 TABLET | Refills: 0 | Status: SHIPPED | OUTPATIENT
Start: 2024-12-03

## 2024-12-17 DIAGNOSIS — M05.9 SEROPOSITIVE RHEUMATOID ARTHRITIS (H): ICD-10-CM

## 2024-12-22 NOTE — TELEPHONE ENCOUNTER
FOLIC ACID 1 MG TABLET       Last Written Prescription Date:  12-3-24  Last Fill Quantity: 30,   # refills: 0  Last Office Visit : 10-9-24  Future Office visit:  none    11-2-24 MTM note  Sending folic acid 4-week supply, to discontinue 4 weeks after methotrexate discontinuation.     Routing refill request to provider for review/approval because:  ? Tx ended

## 2024-12-24 DIAGNOSIS — M05.9 SEROPOSITIVE RHEUMATOID ARTHRITIS (H): ICD-10-CM

## 2024-12-26 RX ORDER — FOLIC ACID 1 MG/1
1000 TABLET ORAL DAILY
Qty: 90 TABLET | Refills: 1 | OUTPATIENT
Start: 2024-12-26

## 2024-12-30 RX ORDER — ADALIMUMAB 40MG/0.4ML
KIT SUBCUTANEOUS
Qty: 2 EACH | Refills: 6 | Status: SHIPPED | OUTPATIENT
Start: 2024-12-30

## 2024-12-30 NOTE — TELEPHONE ENCOUNTER
adalimumab (HUMIRA *CF*) 40 MG/0.4ML pen kit       Last Written Prescription Date:  9/13/24  Last Fill Quantity: 0.8ml,   # refills: 3  Last Office Visit : 10/9/24 Jack Marcus  Future Office visit:  None  Refilled per protocol  Tanja GARZA RN  [facility] Flag Triage & Med Refill Team

## 2025-01-07 ENCOUNTER — VIRTUAL VISIT (OUTPATIENT)
Dept: PHARMACY | Facility: CLINIC | Age: 55
End: 2025-01-07
Attending: INTERNAL MEDICINE
Payer: COMMERCIAL

## 2025-01-07 DIAGNOSIS — Z71.85 VACCINE COUNSELING: ICD-10-CM

## 2025-01-07 DIAGNOSIS — M06.9 RHEUMATOID ARTHRITIS INVOLVING MULTIPLE SITES, UNSPECIFIED WHETHER RHEUMATOID FACTOR PRESENT (H): Primary | ICD-10-CM

## 2025-01-07 NOTE — Clinical Note
Patient is overall doing well on Humira, only change since methotrexate switch is some mild breakthrough symptoms just prior to next dose. Asked that he continue to monitor and let us know if this worsens. No follow up currently scheduled with you, looks like you intended to see him again 4/2025. Thanks!

## 2025-01-07 NOTE — PATIENT INSTRUCTIONS
"Recommendations from today's MTM visit:                                                      Will discuss updating ESR and CRP at next lab draw with provider per patient request.  Continue Humira 40 mg every 14 days. Report any infections, planned surgeries, or disease flares to the rheumatology department.  Consider the following vaccines: COVID and flu boosters.    Follow-up: 3 months, patient to reach out sooner if breakthrough symptoms worsen. No follow up currently scheduled with rheumatologist (intended for ~4/2025).    It was great speaking with you today.  I value your experience and would be very thankful for your time in providing feedback in our clinic survey. In the next few days, you may receive an email or text message from [email or web address] with a link to a survey related to your  clinical pharmacist.\"     To schedule another MTM appointment, please call the clinic directly or you may call the MTM scheduling line at [phone].    My Clinical Pharmacist's contact information:                                                      Please feel free to contact me with any questions or concerns you have.      Diana Rodríguez, PharmD  Medication Therapy Management Pharmacist  [facility]   " ,DirectAddress_Unknown,DirectAddress_Unknown,DirectAddress_Unknown

## 2025-01-12 SDOH — HEALTH STABILITY: PHYSICAL HEALTH: ON AVERAGE, HOW MANY MINUTES DO YOU ENGAGE IN EXERCISE AT THIS LEVEL?: 30 MIN

## 2025-01-12 SDOH — HEALTH STABILITY: PHYSICAL HEALTH: ON AVERAGE, HOW MANY DAYS PER WEEK DO YOU ENGAGE IN MODERATE TO STRENUOUS EXERCISE (LIKE A BRISK WALK)?: 6 DAYS

## 2025-01-12 ASSESSMENT — SOCIAL DETERMINANTS OF HEALTH (SDOH): HOW OFTEN DO YOU GET TOGETHER WITH FRIENDS OR RELATIVES?: THREE TIMES A WEEK

## 2025-01-13 ENCOUNTER — OFFICE VISIT (OUTPATIENT)
Dept: PEDIATRICS | Facility: CLINIC | Age: 55
End: 2025-01-13
Payer: COMMERCIAL

## 2025-01-13 VITALS
WEIGHT: 229 LBS | BODY MASS INDEX: 32.78 KG/M2 | DIASTOLIC BLOOD PRESSURE: 84 MMHG | HEIGHT: 70 IN | TEMPERATURE: 98 F | RESPIRATION RATE: 16 BRPM | OXYGEN SATURATION: 99 % | SYSTOLIC BLOOD PRESSURE: 124 MMHG | HEART RATE: 95 BPM

## 2025-01-13 DIAGNOSIS — N18.31 STAGE 3A CHRONIC KIDNEY DISEASE (H): ICD-10-CM

## 2025-01-13 DIAGNOSIS — E78.5 HYPERLIPIDEMIA WITH TARGET LDL LESS THAN 130: ICD-10-CM

## 2025-01-13 DIAGNOSIS — Z00.00 ROUTINE GENERAL MEDICAL EXAMINATION AT A HEALTH CARE FACILITY: Primary | ICD-10-CM

## 2025-01-13 DIAGNOSIS — M06.9 RHEUMATOID ARTHRITIS INVOLVING MULTIPLE SITES, UNSPECIFIED WHETHER RHEUMATOID FACTOR PRESENT (H): ICD-10-CM

## 2025-01-13 DIAGNOSIS — Z12.5 SCREENING FOR PROSTATE CANCER: ICD-10-CM

## 2025-01-13 DIAGNOSIS — I10 ESSENTIAL HYPERTENSION WITH GOAL BLOOD PRESSURE LESS THAN 140/90: ICD-10-CM

## 2025-01-13 DIAGNOSIS — F41.1 GAD (GENERALIZED ANXIETY DISORDER): ICD-10-CM

## 2025-01-13 DIAGNOSIS — C64.1 CLEAR CELL CARCINOMA OF RIGHT KIDNEY (H): ICD-10-CM

## 2025-01-13 LAB
ANION GAP SERPL CALCULATED.3IONS-SCNC: 10 MMOL/L (ref 7–15)
BUN SERPL-MCNC: 17.7 MG/DL (ref 6–20)
CALCIUM SERPL-MCNC: 9.5 MG/DL (ref 8.8–10.4)
CHLORIDE SERPL-SCNC: 103 MMOL/L (ref 98–107)
CREAT SERPL-MCNC: 1.32 MG/DL (ref 0.67–1.17)
CRP SERPL-MCNC: <3 MG/L
EGFRCR SERPLBLD CKD-EPI 2021: 64 ML/MIN/1.73M2
GLUCOSE SERPL-MCNC: 102 MG/DL (ref 70–99)
HCO3 SERPL-SCNC: 26 MMOL/L (ref 22–29)
POTASSIUM SERPL-SCNC: 4.4 MMOL/L (ref 3.4–5.3)
PSA SERPL DL<=0.01 NG/ML-MCNC: 0.69 NG/ML (ref 0–3.5)
SODIUM SERPL-SCNC: 139 MMOL/L (ref 135–145)

## 2025-01-13 PROCEDURE — 86481 TB AG RESPONSE T-CELL SUSP: CPT | Performed by: INTERNAL MEDICINE

## 2025-01-13 PROCEDURE — 90673 RIV3 VACCINE NO PRESERV IM: CPT | Performed by: INTERNAL MEDICINE

## 2025-01-13 PROCEDURE — G0103 PSA SCREENING: HCPCS | Performed by: INTERNAL MEDICINE

## 2025-01-13 PROCEDURE — 90471 IMMUNIZATION ADMIN: CPT | Performed by: INTERNAL MEDICINE

## 2025-01-13 PROCEDURE — 86140 C-REACTIVE PROTEIN: CPT | Performed by: INTERNAL MEDICINE

## 2025-01-13 PROCEDURE — 80048 BASIC METABOLIC PNL TOTAL CA: CPT | Performed by: INTERNAL MEDICINE

## 2025-01-13 PROCEDURE — 99396 PREV VISIT EST AGE 40-64: CPT | Mod: 25 | Performed by: INTERNAL MEDICINE

## 2025-01-13 PROCEDURE — 36415 COLL VENOUS BLD VENIPUNCTURE: CPT | Performed by: INTERNAL MEDICINE

## 2025-01-13 PROCEDURE — 99213 OFFICE O/P EST LOW 20 MIN: CPT | Mod: 25 | Performed by: INTERNAL MEDICINE

## 2025-01-13 RX ORDER — LOSARTAN POTASSIUM AND HYDROCHLOROTHIAZIDE 12.5; 5 MG/1; MG/1
1 TABLET ORAL DAILY
Qty: 90 TABLET | Refills: 3 | Status: SHIPPED | OUTPATIENT
Start: 2025-01-13

## 2025-01-13 ASSESSMENT — PAIN SCALES - GENERAL: PAINLEVEL_OUTOF10: NO PAIN (0)

## 2025-01-13 NOTE — PROGRESS NOTES
"Preventive Care Visit  [facility] KATHLEEN Puri MD, Internal Medicine - Pediatrics  Jan 13, 2025      Assessment & Plan     (Z00.00) Routine general medical examination at a health care facility  (primary encounter diagnosis)    (M06.9) Rheumatoid arthritis involving multiple sites, unspecified whether rheumatoid factor present (H)  Comment:   Plan: arthralgias have improved/ manage by rheumatology/continue humira    (C64.1) Clear cell carcinoma of right kidney (H)  Comment:   Plan: prior nephrectomy, urology follow-up this Spring    (I10) Essential hypertension with goal blood pressure less than 140/90  Comment:  Adequate control.  Continue current medications  Plan: losartan-hydrochlorothiazide (HYZAAR) 50-12.5         MG tablet          (E78.5) Hyperlipidemia with target LDL less than 130  Comment:   Plan: most recent lipid panel reviewed/ recommended heart healthy diet changes    (F41.1) ISH (generalized anxiety disorder)  Comment:   Plan: Adequate control.  Continue current medication.     (N18.31) Stage 3a chronic kidney disease (H)  Comment:   Plan: rpt BMP    (Z12.5) Screening for prostate cancer  Comment:   Plan: PSA, screen                    BMI  Estimated body mass index is 32.62 kg/m  as calculated from the following:    Height as of this encounter: 1.784 m (5' 10.25\").    Weight as of this encounter: 103.9 kg (229 lb).       Counseling  Appropriate preventive services were addressed with this patient via screening, questionnaire, or discussion as appropriate for fall prevention, nutrition, physical activity, Tobacco-use cessation, social engagement, weight loss and cognition.  Checklist reviewing preventive services available has been given to the patient.  Reviewed patient's diet, addressing concerns and/or questions.           Subjective   Uri is a 54 year old, presenting for the following:  Physical        1/13/2025     9:47 AM   Additional Questions   Roomed by meghan"   Accompanied by na         1/13/2025     9:47 AM   Patient Reported Additional Medications   Patient reports taking the following new medications na          HPI          Health Care Directive  Patient does not have a Health Care Directive: Discussed advance care planning with patient; however, patient declined at this time.      1/12/2025   General Health   How would you rate your overall physical health? Excellent   Feel stress (tense, anxious, or unable to sleep) Not at all         1/12/2025   Nutrition   Three or more servings of calcium each day? Yes   Diet: Regular (no restrictions)   How many servings of fruit and vegetables per day? (!) 2-3   How many sweetened beverages each day? 0-1         1/12/2025   Exercise   Days per week of moderate/strenous exercise 6 days   Average minutes spent exercising at this level 30 min         1/12/2025   Social Factors   Frequency of gathering with friends or relatives Three times a week   Worry food won't last until get money to buy more Patient declined   Food not last or not have enough money for food? Patient declined   Do you have housing? (Housing is defined as stable permanent housing and does not include staying ouside in a car, in a tent, in an abandoned building, in an overnight shelter, or couch-surfing.) Patient declined   Are you worried about losing your housing? Patient declined   Lack of transportation? Patient declined   Unable to get utilities (heat,electricity)? Patient declined         1/12/2025   Fall Risk   Fallen 2 or more times in the past year? No   Trouble with walking or balance? No          1/12/2025   Dental   Dentist two times every year? Yes            Today's PHQ-2 Score:       1/12/2025     3:40 PM   PHQ-2 ( 1999 [employer])   Q1: Little interest or pleasure in doing things 0   Q2: Feeling down, depressed or hopeless 0   PHQ-2 Score 0    Q1: Little interest or pleasure in doing things Not at all   Q2: Feeling down, depressed or hopeless Not at  all   PHQ-2 Score 0       Patient-reported           1/12/2025   Substance Use   Alcohol more than 3/day or more than 7/wk No   Do you use any other substances recreationally? No     Social History     Tobacco Use    Smoking status: Never     Passive exposure: Never    Smokeless tobacco: Never   Vaping Use    Vaping status: Never Used   Substance Use Topics    Alcohol use: Not Currently     Comment: last drink 2014    Drug use: No           1/12/2025   STI Screening   New sexual partner(s) since last STI/HIV test? No   ASCVD Risk   The 10-year ASCVD risk score (Anderson KLEIN, et al., 2019) is: 7.6%    Values used to calculate the score:      Age: 54 years      Sex: Male      Is Non- : No      Diabetic: No      Tobacco smoker: No      Systolic Blood Pressure: 124 mmHg      Is BP treated: Yes      HDL Cholesterol: 32 mg/dL      Total Cholesterol: 195 mg/dL           Reviewed and updated as needed this visit by Provider                    Patient Active Problem List   Diagnosis    Prediabetes    Obesity    Hyperlipidemia with target LDL less than 130    Essential hypertension with goal blood pressure less than 140/90    ISH (generalized anxiety disorder)    Bilateral chronic knee pain    Rheumatoid arthritis involving multiple sites, unspecified whether rheumatoid factor present (H)    Clear cell renal cell carcinoma (H)    Stage 3a chronic kidney disease (H)     Past Surgical History:   Procedure Laterality Date    COLONOSCOPY N/A 11/17/2022    Procedure: COLONOSCOPY;  Surgeon: John Blank MD;  Location: [facility]    DAVINCI NEPHRECTOMY PARTIAL Right 4/30/2024    Procedure: Robot assisted Laparoscopic Partial  NEPHRECTOMY. ULTRASOUND OF RENAL MASS;  Surgeon: Bossman Glaser MD;  Location:  OR       Social History     Tobacco Use    Smoking status: Never     Passive exposure: Never    Smokeless tobacco: Never   Substance Use Topics    Alcohol use: Not Currently     Comment: last drink  "2014     Family History   Problem Relation Age of Onset    Alcohol/Drug Father     Cancer Father         pancreatic    Colon Cancer No family hx of          Current Outpatient Medications   Medication Sig Dispense Refill    citalopram (CELEXA) 20 MG tablet Take 1 tablet (20 mg) by mouth daily. 90 tablet 3    HUMIRA, 2 PEN, 40 MG/0.4ML pen kit INJECT 1 PEN UNDER THE SKIN EVERY 14 DAYS 2 each 6    losartan-hydrochlorothiazide (HYZAAR) 50-12.5 MG tablet Take 1 tablet by mouth daily. 90 tablet 3         Review of Systems  Constitutional, neuro, ENT, endocrine, pulmonary, cardiac, gastrointestinal, genitourinary, musculoskeletal, integument and psychiatric systems are negative, except as otherwise noted.     Objective    Exam  /84 (BP Location: Right arm, Patient Position: Sitting, Cuff Size: Adult Large)   Pulse 95   Temp 98  F (36.7  C) (Temporal)   Resp 16   Ht 1.784 m (5' 10.25\")   Wt 103.9 kg (229 lb)   SpO2 99%   BMI 32.62 kg/m     Estimated body mass index is 32.62 kg/m  as calculated from the following:    Height as of this encounter: 1.784 m (5' 10.25\").    Weight as of this encounter: 103.9 kg (229 lb).    Physical Exam  GENERAL: alert and no distress  EYES: Eyes grossly normal to inspection, PERRL and conjunctivae and sclerae normal  HENT: ear canals and TM's normal, nose and mouth without ulcers or lesions  NECK: no adenopathy, no asymmetry, masses, or scars  RESP: lungs clear to auscultation - no rales, rhonchi or wheezes  CV: regular rate and rhythm, normal S1 S2, no S3 or S4, no murmur, click or rub, no peripheral edema  ABDOMEN: soft, nontender, no hepatosplenomegaly, no masses and bowel sounds normal  MS: no gross musculoskeletal defects noted, no edema  SKIN: no suspicious lesions or rashes  NEURO: Normal strength and tone, mentation intact and speech normal  PSYCH: mentation appears normal, affect normal/bright        Signed Electronically by: John Puri MD    "

## 2025-01-13 NOTE — PATIENT INSTRUCTIONS
Patient Education   Preventive Care Advice   This is general advice given by our system to help you stay healthy. However, your care team may have specific advice just for you. Please talk to your care team about your preventive care needs.  Nutrition  Eat 5 or more servings of fruits and vegetables each day.  Try wheat bread, brown rice and whole grain pasta (instead of white bread, rice, and pasta).  Get enough calcium and vitamin D. Check the label on foods and aim for 100% of the RDA (recommended daily allowance).  Lifestyle  Exercise at least 150 minutes each week  (30 minutes a day, 5 days a week).  Do muscle strengthening activities 2 days a week. These help control your weight and prevent disease.  No smoking.  Wear sunscreen to prevent skin cancer.  Have a dental exam and cleaning every 6 months.  Yearly exams  See your health care team every year to talk about:  Any changes in your health.  Any medicines your care team has prescribed.  Preventive care, family planning, and ways to prevent chronic diseases.  Shots (vaccines)   HPV shots (up to age 26), if you've never had them before.  Hepatitis B shots (up to age 59), if you've never had them before.  COVID-19 shot: Get this shot when it's due.  Flu shot: Get a flu shot every year.  Tetanus shot: Get a tetanus shot every 10 years.  Pneumococcal, hepatitis A, and RSV shots: Ask your care team if you need these based on your risk.  Shingles shot (for age 50 and up)  General health tests  Diabetes screening:  Starting at age 35, Get screened for diabetes at least every 3 years.  If you are younger than age 35, ask your care team if you should be screened for diabetes.  Cholesterol test: At age 39, start having a cholesterol test every 5 years, or more often if advised.  Bone density scan (DEXA): At age 50, ask your care team if you should have this scan for osteoporosis (brittle bones).  Hepatitis C: Get tested at least once in your life.  STIs (sexually  transmitted infections)  Before age 24: Ask your care team if you should be screened for STIs.  After age 24: Get screened for STIs if you're at risk. You are at risk for STIs (including HIV) if:  You are sexually active with more than one person.  You don't use condoms every time.  You or a partner was diagnosed with a sexually transmitted infection.  If you are at risk for HIV, ask about PrEP medicine to prevent HIV.  Get tested for HIV at least once in your life, whether you are at risk for HIV or not.  Cancer screening tests  Cervical cancer screening: If you have a cervix, begin getting regular cervical cancer screening tests starting at age 21.  Breast cancer scan (mammogram): If you've ever had breasts, begin having regular mammograms starting at age 40. This is a scan to check for breast cancer.  Colon cancer screening: It is important to start screening for colon cancer at age 45.  Have a colonoscopy test every 10 years (or more often if you're at risk) Or, ask your provider about stool tests like a FIT test every year or Cologuard test every 3 years.  To learn more about your testing options, visit:   .  For help making a decision, visit:   https://bit.ly/pg71079.  Prostate cancer screening test: If you have a prostate, ask your care team if a prostate cancer screening test (PSA) at age 55 is right for you.  Lung cancer screening: If you are a current or former smoker ages 50 to 80, ask your care team if ongoing lung cancer screenings are right for you.  For informational purposes only. Not to replace the advice of your health care provider. Copyright   2023 [facility]. All rights reserved. Clinically reviewed by the [facility]. [employer] [place] 650948 - REV 01/24.  Eating Healthy Foods: Care Instructions  With every meal, you can make healthy food choices. Try to eat a variety of fruits, vegetables, whole grains, lean proteins, and low-fat dairy products. This can help  "you get the right balance of nutrients, including vitamins and minerals. Small changes add up over time. You can start by adding one healthy food to your meals each day.    Try to make half your plate fruits and vegetables, one-fourth whole grains, and one-fourth lean proteins. Try including dairy with your meals.   Eat more fruits and vegetables. Try to have them with most meals and snacks.   Foods for healthy eating        Fruits   These can be fresh, frozen, canned, or dried.  Try to choose whole fruit rather than fruit juice.  Eat a variety of colors.        Vegetables   These can be fresh, frozen, canned, or dried.  Beans, peas, and lentils count too.        Whole grains   Choose whole-grain breads, cereals, and noodles.  Try brown rice.        Lean proteins   These can include lean meat, poultry, fish, and eggs.  You can also have tofu, beans, peas, lentils, nuts, and seeds.        Dairy   Try milk, yogurt, and cheese.  Choose low-fat or fat-free when you can.  If you need to, use lactose-free milk or fortified plant-based milk products, such as soy milk.        Water   Drink water when you're thirsty.  Limit sugar-sweetened drinks, including soda, fruit drinks, and sports drinks.  Where can you learn more?  Go to https://www.[employer].net/patiented  Enter T756 in the search box to learn more about \"Eating Healthy Foods: Care Instructions.\"  Current as of: September 20, 2023  Content Version: 14.3    2024 [employer].   Care instructions adapted under license by your healthcare professional. If you have questions about a medical condition or this instruction, always ask your healthcare professional. [employer] disclaims any warranty or liability for your use of this information.       "

## 2025-01-14 LAB
GAMMA INTERFERON BACKGROUND BLD IA-ACNC: 0.19 IU/ML
M TB IFN-G BLD-IMP: NEGATIVE
M TB IFN-G CD4+ BCKGRND COR BLD-ACNC: 9.81 IU/ML
MITOGEN IGNF BCKGRD COR BLD-ACNC: -0.07 IU/ML
MITOGEN IGNF BCKGRD COR BLD-ACNC: 0.01 IU/ML
QUANTIFERON MITOGEN: 10 IU/ML
QUANTIFERON NIL TUBE: 0.19 IU/ML
QUANTIFERON TB1 TUBE: 0.12 IU/ML
QUANTIFERON TB2 TUBE: 0.2

## 2025-02-25 ENCOUNTER — TELEPHONE (OUTPATIENT)
Dept: RHEUMATOLOGY | Facility: CLINIC | Age: 55
End: 2025-02-25
Payer: COMMERCIAL

## 2025-02-25 NOTE — TELEPHONE ENCOUNTER
1st attempt: LVM for Uri to reschedule his appointment. Please transfer call to Mirna @ [phone]. Thank you  Mirna

## 2025-04-17 ENCOUNTER — MYC MEDICAL ADVICE (OUTPATIENT)
Dept: PEDIATRICS | Facility: CLINIC | Age: 55
End: 2025-04-17

## 2025-04-17 ENCOUNTER — VIRTUAL VISIT (OUTPATIENT)
Dept: RHEUMATOLOGY | Facility: CLINIC | Age: 55
End: 2025-04-17
Payer: COMMERCIAL

## 2025-04-17 VITALS — HEIGHT: 71 IN | WEIGHT: 225 LBS | BODY MASS INDEX: 31.5 KG/M2

## 2025-04-17 DIAGNOSIS — R76.8 RHEUMATOID FACTOR POSITIVE: ICD-10-CM

## 2025-04-17 DIAGNOSIS — Z79.899 LONG-TERM USE OF HIGH-RISK MEDICATION: ICD-10-CM

## 2025-04-17 DIAGNOSIS — C64.1 CLEAR CELL CARCINOMA OF RIGHT KIDNEY (H): ICD-10-CM

## 2025-04-17 DIAGNOSIS — R76.8 POSITIVE ANA (ANTINUCLEAR ANTIBODY): ICD-10-CM

## 2025-04-17 DIAGNOSIS — R76.8 CYCLIC CITRULLINATED PEPTIDE (CCP) ANTIBODY POSITIVE: ICD-10-CM

## 2025-04-17 DIAGNOSIS — M05.9 SEROPOSITIVE RHEUMATOID ARTHRITIS (H): Primary | ICD-10-CM

## 2025-04-17 ASSESSMENT — PAIN SCALES - GENERAL: PAINLEVEL_OUTOF10: NO PAIN (0)

## 2025-04-17 NOTE — PROGRESS NOTES
TELE HEALTH NOTE- RHEUMATOLOGY    Verbal consent obtained from patient for telehealth services provided below.  Communication with patient was conducted via epic tele video.  Location of Patient: Home  Location of Provider: Home  Joined the call at 4/17/2025, 9:57:53 am.  Left the call at 4/17/2025, 10:02:28 am.  You were on the call for 4 minutes 35 seconds.  I spent over 50% of the visit devoted to coordination of care and counseling with the patient, as documented under PLANS below.  Geovanni Galvez       CC: Follow up for rheumatoid arthritis     HPI:    Patient is a 54 year old White male presenting for Telemedicine today as a follow up patient.     He is currently off of methotrexate and on Humira alone. He has been doing well. Denied having joint pain, stiffness or swelling of joints.       HISTORY:    Past Medical History:   Diagnosis Date    Hypertension        Past Surgical History:   Procedure Laterality Date    COLONOSCOPY N/A 11/17/2022    Procedure: COLONOSCOPY;  Surgeon: John Blank MD;  Location:  GI    DAVINCI NEPHRECTOMY PARTIAL Right 4/30/2024    Procedure: Robot assisted Laparoscopic Partial  NEPHRECTOMY. ULTRASOUND OF RENAL MASS;  Surgeon: Bossman Glaser MD;  Location: [facility] OR       Family History   Problem Relation Age of Onset    Alcohol/Drug Father     Cancer Father         pancreatic    Colon Cancer No family hx of        History   Smoking Status    Never   Smokeless Tobacco    Never       Social History    Substance and Sexual Activity      Alcohol use: Not Currently        Comment: last drink 2014      History   Drug Use No       Social History     Social History Narrative    Not on file       Current Outpatient Medications   Medication Sig Dispense Refill    citalopram (CELEXA) 20 MG tablet Take 1 tablet (20 mg) by mouth daily. 90 tablet 3    HUMIRA, 2 PEN, 40 MG/0.4ML pen kit INJECT 1 PEN UNDER THE SKIN EVERY 14 DAYS 2 each 6    losartan-hydrochlorothiazide (HYZAAR) 50-12.5  MG tablet Take 1 tablet by mouth daily. 90 tablet 3       REVIEW OF SYSTEMS:    Constitutional: negative  HEENT: negative  Cardiovascular: negative  Respiratory: negative   Genitourinary: negative  Musculoskeletal: negative  Skin: negative  Neuro: negative  Endo: negative  Hemat: negative    TELE HEALTH EXAM:    The patient sounded Alert and oriented  General: Alert, No apparent distress   Psych: Affect euthymic  Skin: No rashes noted  Neck: No visible swelling  Respiratory: Breathing appears normal  Labs:    Jan 2025  Creatinine elevated at 1.32  CRP normal  QTB neg     Sept 2024  AST, ALT normal   CBC normal    ASSESSMENT:    1. Seropositive rheumatoid arthritis (H)    2. Rheumatoid factor positive    3. Cyclic citrullinated peptide (CCP) antibody positive    4. Positive SAIRA (antinuclear antibody)    5. Long-term use of high-risk medication    6. Clear cell carcinoma of right kidney (H)       (M25.50) Polyarthralgia  (primary encounter diagnosis)  (R76.8) Rheumatoid factor positive  (R76.8) Cyclic citrullinated peptide (CCP) antibody positive  Comment: RF-90, CCP-12. X ray hands - b/l [facility] arthrosis.  Initial symptoms included pain and swelling of hands.X ray without changes of inflammatory arthropathy.      Started on SSZ in Nov 2023.  Discontinued later due to GI upset.Will avoid plaquenil as he may likely need more than that to control disease activity and patient also prefers to avoid HCQ after side effects were explained. Currently on Humira since 9/2024. Methotrexate stopped 2/2 rise in creatinine.     He reports doing well on Humira.     Plan:   Continue Humira 40 mg subcutaneous every two weeks.   Check labs this week.   Orders Placed This Encounter   Procedures    CBC with platelets    Comprehensive metabolic panel    CRP, inflammation       (R76.8) Positive SAIRA (antinuclear antibody)  Comment: noted, 1:40, speckled. No clinical features of CTD.   Plan: check additional labs as below.     Long term use of  high risk medication   Comment : Humira  Plan :     have discussed with potential adverse effects with use of biologics such as suppression of immune system making pt more prone to infections, to hold the medication if the patient has any signs of any infection and to restart only after infection has cleared, reactivation of infections particularly TB and hepatitis B, C, fungal infections, risk of demyelinating disorders and to stop medication immediately if there's any weakness or paresthesias, risk of malignancy. Patient verbalized understanding and agrees to proceed.     RCC   Comment : s/p right partial nephrectomy in 2024  Plan : per urology      Rtc in 6 months.       After visit summary (AVS ) documentation will be available through [employer] for this encounter.     My diagnostic impression and treatment plans were discussed at length with the patient.  All side effects as well as drug-drug interactions and risks discussed at length.    Vero Santiago MD

## 2025-04-17 NOTE — PROGRESS NOTES
"Virtual Visit Details    Type of service:  Video Visit     Originating Location (pt. Location): {video visit patient location:337262::\"Home\"}  {PROVIDER LOCATION On-site should be selected for visits conducted from your clinic location or adjoining [facility] hospital, academic office, or other nearby [facility] building. Off-site should be selected for all other provider locations, including home:[id number]}  Distant Location (provider location):  {virtual location provider:673597}  Platform used for Video Visit: {Virtual Visit Platforms:727711::\"Golgi\"}  "

## 2025-04-17 NOTE — NURSING NOTE
Current patient location: [address] [place] [address]    Is the patient currently in the state of [place]? YES    Visit mode: VIDEO    If the visit is dropped, the patient can be reconnected by:VIDEO VISIT: Text to cell phone:   Telephone Information:   Mobile [phone]    and VIDEO VISIT: Send to e-mail at: [email or web address]    Will anyone else be joining the visit? NO  (If patient encounters technical issues they should call [phone])    Are changes needed to the allergy or medication list? No    Patient denies any changes since echeck-in completion and states all information entered during echeck-in remains accurate.    Are refills needed on medications prescribed by this physician? NO    Rooming Documentation:  Questionnaire(s) completed    No other vitals to report today    Reason for visit: FAUSTINA Braxton MA VVF

## 2025-04-18 NOTE — TELEPHONE ENCOUNTER
Dr. Puri- please read [employer] message   - wanting clarification on 3a CKD dx in chart     - chart review shows 3a CKD on problem list added 1/13/2025  - listed in OV note 1/13    Most recent GFR 1/13/25  GFR Estimate  >60 mL/min/1.73m2 64     Please advise.  Mary Ellen Rose RN

## 2025-04-22 ENCOUNTER — HOSPITAL ENCOUNTER (OUTPATIENT)
Dept: CT IMAGING | Facility: CLINIC | Age: 55
Discharge: HOME | End: 2025-04-22
Attending: UROLOGY | Admitting: UROLOGY
Payer: COMMERCIAL

## 2025-04-22 ENCOUNTER — LAB (OUTPATIENT)
Dept: INFUSION THERAPY | Facility: CLINIC | Age: 55
End: 2025-04-22
Attending: UROLOGY
Payer: COMMERCIAL

## 2025-04-22 DIAGNOSIS — C64.1 MALIGNANT NEOPLASM OF KIDNEY EXCLUDING RENAL PELVIS, RIGHT (H): ICD-10-CM

## 2025-04-22 DIAGNOSIS — M05.9 SEROPOSITIVE RHEUMATOID ARTHRITIS (H): ICD-10-CM

## 2025-04-22 DIAGNOSIS — N28.0 RENAL INFARCT: ICD-10-CM

## 2025-04-22 LAB
ALBUMIN SERPL BCG-MCNC: 4.4 G/DL (ref 3.5–5.2)
ALP SERPL-CCNC: 64 U/L (ref 40–150)
ALT SERPL W P-5'-P-CCNC: 41 U/L (ref 0–70)
ANION GAP SERPL CALCULATED.3IONS-SCNC: 9 MMOL/L (ref 7–15)
AST SERPL W P-5'-P-CCNC: 25 U/L (ref 0–45)
BILIRUB SERPL-MCNC: 0.5 MG/DL
BUN SERPL-MCNC: 15.6 MG/DL (ref 6–20)
CALCIUM SERPL-MCNC: 8.9 MG/DL (ref 8.8–10.4)
CHLORIDE SERPL-SCNC: 102 MMOL/L (ref 98–107)
CREAT SERPL-MCNC: 1.26 MG/DL (ref 0.67–1.17)
CRP SERPL-MCNC: <3 MG/L
EGFRCR SERPLBLD CKD-EPI 2021: 68 ML/MIN/1.73M2
ERYTHROCYTE [DISTWIDTH] IN BLOOD BY AUTOMATED COUNT: 12.4 % (ref 10–15)
GLUCOSE SERPL-MCNC: 108 MG/DL (ref 70–99)
HCO3 SERPL-SCNC: 27 MMOL/L (ref 22–29)
HCT VFR BLD AUTO: 44.8 % (ref 40–53)
HGB BLD-MCNC: 15.3 G/DL (ref 13.3–17.7)
MCH RBC QN AUTO: 29.8 PG (ref 26.5–33)
MCHC RBC AUTO-ENTMCNC: 34.2 G/DL (ref 31.5–36.5)
MCV RBC AUTO: 87 FL (ref 78–100)
PLATELET # BLD AUTO: 237 10E3/UL (ref 150–450)
POTASSIUM SERPL-SCNC: 4.3 MMOL/L (ref 3.4–5.3)
PROT SERPL-MCNC: 7.1 G/DL (ref 6.4–8.3)
RBC # BLD AUTO: 5.13 10E6/UL (ref 4.4–5.9)
SODIUM SERPL-SCNC: 138 MMOL/L (ref 135–145)
WBC # BLD AUTO: 5.5 10E3/UL (ref 4–11)

## 2025-04-22 PROCEDURE — 250N000009 HC RX 250: Performed by: UROLOGY

## 2025-04-22 PROCEDURE — 82947 ASSAY GLUCOSE BLOOD QUANT: CPT | Performed by: INTERNAL MEDICINE

## 2025-04-22 PROCEDURE — 85014 HEMATOCRIT: CPT | Performed by: INTERNAL MEDICINE

## 2025-04-22 PROCEDURE — 36415 COLL VENOUS BLD VENIPUNCTURE: CPT

## 2025-04-22 PROCEDURE — 250N000011 HC RX IP 250 OP 636: Performed by: UROLOGY

## 2025-04-22 PROCEDURE — 74178 CT ABD&PLV WO CNTR FLWD CNTR: CPT

## 2025-04-22 PROCEDURE — 86140 C-REACTIVE PROTEIN: CPT | Performed by: INTERNAL MEDICINE

## 2025-04-22 PROCEDURE — 84155 ASSAY OF PROTEIN SERUM: CPT | Performed by: INTERNAL MEDICINE

## 2025-04-22 RX ORDER — IOPAMIDOL 755 MG/ML
500 INJECTION, SOLUTION INTRAVASCULAR ONCE
Status: COMPLETED | OUTPATIENT
Start: 2025-04-22 | End: 2025-04-22

## 2025-04-22 RX ADMIN — SODIUM CHLORIDE 80 ML: 9 INJECTION, SOLUTION INTRAVENOUS at 10:23

## 2025-04-22 RX ADMIN — IOPAMIDOL 100 ML: 755 INJECTION, SOLUTION INTRAVENOUS at 10:23

## 2025-04-22 NOTE — PROGRESS NOTES
Nursing Note:  Geovanni RIVERA Lenny presents today for Labs + PIV insertion for CT scan.    Patient seen by provider today: No   present during visit today: Not Applicable.    Note: Uri requested his lab be drawn for his Rheumatologist Dr. Santiago as well.    Intravenous Access:  Labs drawn without difficulty.  Peripheral IV placed.    Discharge Plan:   Patient was sent to imaging department for CT appointment.  Imaging staff to discontinue PIV after scan is complete.      Seymour Herrera RN

## 2025-06-13 NOTE — TELEPHONE ENCOUNTER
Medication refilled per [facility] protocol    Ashleigh Calhoun RN    
Routing refill request to provider for review/approval because:  Drug interaction warning: Celexa & Naproxen      
3

## (undated) DEVICE — LINEN TOWEL PACK X6 WHITE 5487

## (undated) DEVICE — DRSG PRIMAPORE 02X3" 7133

## (undated) DEVICE — SURGICEL HEMOSTAT 4X8" 1952

## (undated) DEVICE — DEVICE SUTURE PASSER 14GA WECK EFX EFXSP2

## (undated) DEVICE — SUCTION MANIFOLD NEPTUNE 2 SYS 4 PORT 0702-020-000

## (undated) DEVICE — SU VICRYL 0 TIE 54" J608H

## (undated) DEVICE — TUBING FILTER TRI-LUMEN AIRSEAL ASC-EVAC1

## (undated) DEVICE — DRAPE IOBAN INCISE 23X17" 6650EZ

## (undated) DEVICE — CLIP ENDO HEMO-LOC PURPLE LG 544240

## (undated) DEVICE — BLADE CLIPPER SGL USE 9680

## (undated) DEVICE — WIPES FOLEY CARE SURESTEP PROVON DFC100

## (undated) DEVICE — PACK DAVINCI UROL

## (undated) DEVICE — ANTIFOG SOLUTION SEE SHARP 150M TROCAR SWABS 30978

## (undated) DEVICE — SU MONOCRYL 4-0 PS-2 27" UND Y426H

## (undated) DEVICE — ENDO TROCAR CONMED AIRSEAL BLADELESS 12X120MM IAS12-120LP

## (undated) DEVICE — GLOVE GAMMEX NEOPRENE ULTRA SZ 7.5 LF 8515

## (undated) DEVICE — ENDO TROCAR FIRST ENTRY KII FIOS Z-THRD 05X100MM CTF03

## (undated) DEVICE — DRAIN JACKSON PRATT RESERVOIR 100ML SU130-1305

## (undated) DEVICE — SU ETHILON 2-0 FS 18" 664H

## (undated) DEVICE — SU VICRYL 0 CT-2 27" J334H

## (undated) DEVICE — ENDO TROCAR FIRST ENTRY KII FIOS Z-THRD 12X100MM CTF73

## (undated) DEVICE — SYR 10ML FINGER CONTROL W/O NDL 309695

## (undated) DEVICE — KIT ENDO TURNOVER/PROCEDURE W/CLEAN A SCOPE LINERS 103888

## (undated) DEVICE — ENDO POUCH UNIV RETRIEVAL SYSTEM INZII 10MM CD001

## (undated) DEVICE — DAVINCI XI DRAPE COLUMN 470341

## (undated) DEVICE — DAVINCI XI SEAL UNIVERSAL 5-12MM 470500

## (undated) DEVICE — SU DERMABOND ADVANCED .7ML DNX12

## (undated) DEVICE — NDL INSUFFLATION 13GA 120MM C2201

## (undated) DEVICE — SU WND CLOSURE VLOC 90 ABS 3-0 VIOLET 6" CV-23 VLOCM0804

## (undated) DEVICE — DRAIN JACKSON PRATT CHANNEL 19FR ROUND HUBLESS SIL JP-2230

## (undated) DEVICE — RX SURGIFLO HEMOSTATIC MATRIX W/THROMBIN 8ML 2994

## (undated) DEVICE — DAVINCI HOT SHEARS TIP COVER  400180

## (undated) DEVICE — DRAPE SHEET REV FOLD 3/4 9349

## (undated) DEVICE — DRAPE CARDIOVASCULAR VELCRO SPLIT 9157

## (undated) DEVICE — DAVINCI XI DRAPE ARM 470015

## (undated) DEVICE — LINEN TOWEL PACK X30 5481

## (undated) RX ORDER — LABETALOL HYDROCHLORIDE 5 MG/ML
INJECTION, SOLUTION INTRAVENOUS
Status: DISPENSED
Start: 2024-04-30

## (undated) RX ORDER — SODIUM CHLORIDE 9 MG/ML
INJECTION, SOLUTION INTRAVENOUS
Status: DISPENSED
Start: 2024-04-30

## (undated) RX ORDER — HYDROMORPHONE HCL IN WATER/PF 6 MG/30 ML
PATIENT CONTROLLED ANALGESIA SYRINGE INTRAVENOUS
Status: DISPENSED
Start: 2024-04-30

## (undated) RX ORDER — ACETAMINOPHEN 325 MG/1
TABLET ORAL
Status: DISPENSED
Start: 2024-04-30

## (undated) RX ORDER — MAGNESIUM SULFATE HEPTAHYDRATE 40 MG/ML
INJECTION, SOLUTION INTRAVENOUS
Status: DISPENSED
Start: 2024-04-30

## (undated) RX ORDER — BUPIVACAINE HYDROCHLORIDE 2.5 MG/ML
INJECTION, SOLUTION EPIDURAL; INFILTRATION; INTRACAUDAL
Status: DISPENSED
Start: 2024-04-30

## (undated) RX ORDER — FENTANYL CITRATE 50 UG/ML
INJECTION, SOLUTION INTRAMUSCULAR; INTRAVENOUS
Status: DISPENSED
Start: 2024-04-30

## (undated) RX ORDER — HYDROMORPHONE HYDROCHLORIDE 1 MG/ML
INJECTION, SOLUTION INTRAMUSCULAR; INTRAVENOUS; SUBCUTANEOUS
Status: DISPENSED
Start: 2024-04-30

## (undated) RX ORDER — ONDANSETRON 2 MG/ML
INJECTION INTRAMUSCULAR; INTRAVENOUS
Status: DISPENSED
Start: 2024-04-30

## (undated) RX ORDER — FENTANYL CITRATE-0.9 % NACL/PF 10 MCG/ML
PLASTIC BAG, INJECTION (ML) INTRAVENOUS
Status: DISPENSED
Start: 2024-04-30

## (undated) RX ORDER — CEFAZOLIN SODIUM/WATER 2 G/20 ML
SYRINGE (ML) INTRAVENOUS
Status: DISPENSED
Start: 2024-04-30

## (undated) RX ORDER — CEFAZOLIN SODIUM 1 G/3ML
INJECTION, POWDER, FOR SOLUTION INTRAMUSCULAR; INTRAVENOUS
Status: DISPENSED
Start: 2024-04-30